# Patient Record
Sex: FEMALE | Race: WHITE | NOT HISPANIC OR LATINO | Employment: FULL TIME | URBAN - METROPOLITAN AREA
[De-identification: names, ages, dates, MRNs, and addresses within clinical notes are randomized per-mention and may not be internally consistent; named-entity substitution may affect disease eponyms.]

---

## 2017-02-23 ENCOUNTER — HOSPITAL ENCOUNTER (EMERGENCY)
Facility: CLINIC | Age: 27
Discharge: HOME | End: 2017-02-23
Attending: PHYSICIAN ASSISTANT | Admitting: PHYSICIAN ASSISTANT
Payer: COMMERCIAL

## 2017-02-23 VITALS
BODY MASS INDEX: 29.88 KG/M2 | DIASTOLIC BLOOD PRESSURE: 81 MMHG | SYSTOLIC BLOOD PRESSURE: 121 MMHG | WEIGHT: 175 LBS | RESPIRATION RATE: 16 BRPM | HEART RATE: 97 BPM | HEIGHT: 64 IN | TEMPERATURE: 98 F | OXYGEN SATURATION: 100 %

## 2017-02-23 DIAGNOSIS — R11.10 VOMITING AND DIARRHEA: ICD-10-CM

## 2017-02-23 DIAGNOSIS — R19.7 VOMITING AND DIARRHEA: ICD-10-CM

## 2017-02-23 LAB
ANION GAP SERPL CALCULATED.3IONS-SCNC: 13 MMOL/L (ref 3–14)
BUN SERPL-MCNC: 12 MG/DL (ref 7–30)
CALCIUM SERPL-MCNC: 8.9 MG/DL (ref 8.5–10.1)
CHLORIDE SERPL-SCNC: 108 MMOL/L (ref 94–109)
CO2 SERPL-SCNC: 19 MMOL/L (ref 20–32)
CREAT SERPL-MCNC: 0.72 MG/DL (ref 0.52–1.04)
GFR SERPL CREATININE-BSD FRML MDRD: ABNORMAL ML/MIN/1.7M2
GLUCOSE SERPL-MCNC: 120 MG/DL (ref 70–99)
POTASSIUM SERPL-SCNC: 4 MMOL/L (ref 3.4–5.3)
SODIUM SERPL-SCNC: 140 MMOL/L (ref 133–144)

## 2017-02-23 PROCEDURE — 80048 BASIC METABOLIC PNL TOTAL CA: CPT | Performed by: PHYSICIAN ASSISTANT

## 2017-02-23 PROCEDURE — 96361 HYDRATE IV INFUSION ADD-ON: CPT

## 2017-02-23 PROCEDURE — 96374 THER/PROPH/DIAG INJ IV PUSH: CPT

## 2017-02-23 PROCEDURE — 99284 EMERGENCY DEPT VISIT MOD MDM: CPT | Mod: 25

## 2017-02-23 PROCEDURE — 25000128 H RX IP 250 OP 636: Performed by: PHYSICIAN ASSISTANT

## 2017-02-23 RX ORDER — ONDANSETRON 4 MG/1
4 TABLET, ORALLY DISINTEGRATING ORAL EVERY 4 HOURS PRN
Qty: 10 TABLET | Refills: 0 | Status: SHIPPED | OUTPATIENT
Start: 2017-02-23 | End: 2017-02-26

## 2017-02-23 RX ORDER — ONDANSETRON 2 MG/ML
4 INJECTION INTRAMUSCULAR; INTRAVENOUS ONCE
Status: COMPLETED | OUTPATIENT
Start: 2017-02-23 | End: 2017-02-23

## 2017-02-23 RX ADMIN — SODIUM CHLORIDE 1000 ML: 9 INJECTION, SOLUTION INTRAVENOUS at 16:14

## 2017-02-23 RX ADMIN — ONDANSETRON 4 MG: 2 INJECTION INTRAMUSCULAR; INTRAVENOUS at 16:14

## 2017-02-23 ASSESSMENT — ENCOUNTER SYMPTOMS
DIARRHEA: 1
VOMITING: 1
COUGH: 0
ABDOMINAL PAIN: 1
DIZZINESS: 1
NAUSEA: 1

## 2017-02-23 NOTE — ED PROVIDER NOTES
History     Chief Complaint:  Nausea, vomiting, and diarrhea    HPI   Gloria Rodríguez is a 26 year old female who presents with  nausea, vomiting, and diarrhea that began this morning. The patient began having the symptoms, along with a sharp pain in her stomach this morning. She notes that she is also experiencing dizziness, potentially due to dehydration. She denies any cough or runny nose outside of what may be explained by her allergies. She is particularly worried about her symptoms given that she has a 5 month old baby at home and she feels dehydated. Of note, she has recently had her period at the end of January and is not concerned for pregnancy.     With dAD  Allergies:  Ibuprofen, hives  Sulfa drugs, hives  Silver, rash    Medications:    None    Past Medical History:    The patient denies any significant past medical history.    Past Surgical History:    The patient does not have any pertinent past surgical history.    Family History:    No past pertinent family history.      Social History:  Negative for tobacco use.  Negative for alcohol use.  Accompanied by her father   Marital Status:  Single [1]     Review of Systems   Respiratory: Negative for cough.    Gastrointestinal: Positive for abdominal pain, diarrhea, nausea and vomiting.   Neurological: Positive for dizziness.   All other systems reviewed and are negative.      Physical Exam   First Vitals:  /68  Pulse 115  RR 18  SPO2 100%   Temp 98F    Physical Exam  General: Alert, interactive, appears comfortable but fatiged    Eye:  Pupils are equal, round, and reactive.      ENT:     No rhinorrhea.     Dry  mucus membranes.  Oropharynx is clear with no exudates.     Uvula midline    Neck: No rigidity.               Cardiac:  Mildly tachycardic rate and rhythm. S1, S2.  No murmurs, gallops, or rubs.    Pulmonary:  Clear to auscultation bilaterally.  No wheezes or crackles.             Non labored. No use of accessory muscles.     Abdomen:   Abdomen is soft and non-distended, without focal tenderness. No rebound or guarding.     Musculoskeletal:  Freely moveable extremities    Skin:  Warm and dry without rashes.    Neurologic:  Non-focal exam without asymmetric weakness or numbness.  GCS 15    Psychiatric:  Awake. Normal affect with appropriate interaction with examiner.      Emergency Department Course   Laboratory:  BMP: Na 140, K 4.0, Glucose 120, Cr 0.72, GFR >90, BUN 12    Interventions:  1L NS IV  Zofran 4mg IV     Emergency Department Course:  Nursing notes and vitals reviewed.  I performed an exam of the patient as documented above.    Blood drawn. This was sent to the lab for further testing, results above.    The patient provided a urine sample here in the emergency department. This was sent for laboratory testing, findings above.     The patient was shared the results and is discharged home.       Impression & Plan      Medical Decision Making:  Gloria Rodríguez is a 26 year old female who presents with  nausea, vomiting, and diarrhea that began this morning. The pt is afebrile and clinically well appearing. She clinically appears mildly dehydrated and was given IV fluid and zofran. BMP is unremarkable for significant electrolyte abnormality. She has a benign abdominal exam making severe underlying acute abdomen unlikely therefore I did not pursue any further abdominal imaging. Clinically, this is likely a viral illness or possible food borne. I believe she is stable to discharge home with zofran and oral hydration. I told her to return to the ED if she develops a fever, consistent worsening abdominal pain, bloody stools.     Diagnosis:    ICD-10-CM    1. Vomiting and diarrhea R11.10 Basic metabolic panel    R19.7          Discharge Medications:  New Prescriptions    ONDANSETRON (ZOFRAN ODT) 4 MG ODT TAB    Take 1 tablet (4 mg) by mouth every 4 hours as needed for nausea       Fredis SMITH am serving as a scribe on 2/23/2017 at 3:28 PM to  personally document services performed by Elyssa Duncan PA, based on my observations and the provider's statements to me.    Samuel Coronado  2/23/2017    EMERGENCY DEPARTMENT       Elyssa Duncan PA-C  02/23/17 0910

## 2017-02-23 NOTE — ED AVS SNAPSHOT
Emergency Department    [address] [place] [address]    Phone:  [phone]    Fax:  [phone]                                       Gloria VILLAFANA Marcos   MRN: [id number]    Department:   Emergency Department   Date of Visit:  2/23/2017           After Visit Summary Signature Page     I have received my discharge instructions, and my questions have been answered. I have discussed any challenges I see with this plan with the nurse or doctor.    ..........................................................................................................................................  Patient/Patient Representative Signature      ..........................................................................................................................................  Patient Representative Print Name and Relationship to Patient    ..................................................               ................................................  Date                                            Time    ..........................................................................................................................................  Reviewed by Signature/Title    ...................................................              ..............................................  Date                                                            Time

## 2017-02-23 NOTE — LETTER
EMERGENCY DEPARTMENT  [address] [facility] [address]  Phone: [phone]  Fax: [phone]    February 23, 2017        Gloria Rodríguez  [address] [facility] [address]          To whom it may concern:    RE: Gloria VILLAFANA Marcos    Patient was seen and treated today at our clinic and missed work. Please excuse her 2/23/17, 2/24/17, and 2/25/17 due to illness.     Please contact me for questions or concerns.      Sincerely,      Elyssa Duncan PA-C

## 2017-02-23 NOTE — DISCHARGE INSTRUCTIONS
Discharge Instructions  Gastroenteritis    You have been seen today for vomiting and diarrhea, called gastroenteritis or the stomach flu. This is usually caused by a virus, but some bacteria, parasites, medicines or other medical conditions can cause similar symptoms. At this time your doctor does not find that your vomiting and diarrhea is a sign of anything dangerous or life-threatening.  However, sometimes the signs of serious illness do not show up right away.  If you have new or worse symptoms, you may need to be seen again in the Emergency Department or by your primary doctor. Remember that serious problems like appendicitis can look like gastroenteritis at first.       Return to the Emergency Department if:    You keep throwing up and you are not able to keep liquids down.    You feel you are getting dehydrated, such as being very thirsty, not urinating at least every 8-12 hours, or feeling faint or lightheaded.     You develop a new fever, or your fever continues for more than 2 days.    You have belly pain that seems worse than cramps, is in one spot, or is getting worse over time.     You have blood in your vomit or in your diarrhea.    You feel very weak.    You are not starting to improve within 24 hours of your visit here.    What can I do to help myself?    The most important thing to do is to drink clear liquids.  If you have been vomiting a lot, it is best to have only small, frequent sips of liquids.  Drinking too much at once may cause more vomiting. If you are vomiting often, you must replace minerals, sodium and potassium lost with your illness. Pedialyte  and sports drinks can help you replace these minerals.  You can also drink clear liquids such as water, weak tea, apple juice, and 7-Up . Avoid acid liquids (orange), caffeine (coffee) or alcohol. Do not drink milk until you no longer have diarrhea.    After liquids are staying down, you may start eating mild foods. Soda crackers, toast, plain  noodles, gelatin, applesauce and bananas are good first choices.  Avoid foods that have acid, are spicy, fatty or fibrous (such as meats, coarse grains, vegetables). You may start eating these foods again in about 3 days when you are better.    Sometimes treatment includes prescription medicine to prevent nausea and vomiting and to prevent diarrhea. If your doctor prescribes these for you, take them as directed.    Nonprescription medicine is available for the treatment of diarrhea and can be very effective.  If you use it, make sure you use the dose recommended on the package. Avoid Lomotil . Check with your healthcare provider before you use any medicine for diarrhea.    Don t take ibuprofen, or other nonsteroidal anti-inflammatory medicines without checking with your healthcare provider.  If you were given a prescription for medicine here today, be sure to read all of the information (including the package insert) that comes with your prescription.  This will include important information about the medicine, its side effects, and any warnings that you need to know about.  The pharmacist who fills the prescription can provide more information and answer questions you may have about the medicine.  If you have questions or concerns that the pharmacist cannot address, please call or return to the Emergency Department.       Remember that you can always come back to the Emergency Department if you are not able to see your regular doctor in the amount of time listed above, if you get any new symptoms, or if there is anything that worries you.

## 2018-02-06 ENCOUNTER — ALLIED HEALTH/NURSE VISIT (OUTPATIENT)
Dept: EDUCATION SERVICES | Facility: CLINIC | Age: 28
End: 2018-02-06
Payer: COMMERCIAL

## 2018-02-06 VITALS — BODY MASS INDEX: 34.72 KG/M2 | HEIGHT: 64 IN | WEIGHT: 203.4 LBS

## 2018-02-06 DIAGNOSIS — O24.419 GDM (GESTATIONAL DIABETES MELLITUS): Primary | ICD-10-CM

## 2018-02-06 PROCEDURE — 99207 ZZC DROP WITH A PROCEDURE: CPT

## 2018-02-06 PROCEDURE — G0109 DIAB MANAGE TRN IND/GROUP: HCPCS

## 2018-02-06 RX ORDER — BLOOD-GLUCOSE METER
EACH MISCELLANEOUS
Qty: 1 KIT | Refills: 0 | COMMUNITY
Start: 2018-02-06 | End: 2019-07-20

## 2018-02-06 NOTE — PROGRESS NOTES
"Diabetes Self-Management Training - Gestational Diabetes    SUBJECTIVE/OBJECTIVE:  Gloria Tsain presents today for education related to gestational diabetes.  She is accompanied by self    Patient's gestational diabetes management related comments/concerns: keeping a regular diet and not eating enough    Patient's emotional response to diabetes: expresses readiness to learn    Ht 1.626 m (5' 4\")  Wt 92.3 kg (203 lb 6.4 oz)  BMI 34.91 kg/m2    Pre pregnancy weight: 200#    Weight gain 3.4 lbs at 31 weeks gestation.  Says her doctor told her to cut out carbs and sugar and thinks she lost 8 lbs over the weekend since didn't eat much.     Estimated Date of Delivery: Has  scheduled for April 3, 2018    Lab Results   Component Value Date     2017       1 hour OGTT: 140 on 18  3 hour OGTT: Fastin; 1 hr: 188; 2 hr: 176, 3 hr: 146 on 18    History   Smoking Status     Never Smoker   Smokeless Tobacco     Never Used       Lifestyle and Health Behaviors:  Physical Activity: Walking 30 minutes a day.    Nutrition:  Patient eats 3 meals and 3 snacks per day.    Wakes: 8-9am  Breakfast:  10am: eggs with mushroom   Snack:  12pm: cottage cheese  Lunch:  1:30-2pm: chicken breast with veggies  Snack:  4-5pm: peanuts  Dinner:  8pm: fish with veggies   Bedtime Snack:  11pm: pickles, cottage cheese OR sliced cheese    Other time(s) food is eaten? none     Beverages: Juice 8oz/day, Milk 16oz/day, Pop (Regular) 8oz/day and Water all day    Cultural/Yazdanism diet restrictions: No  Food allergy: bananas    Biggest challenge to healthy eating is:  variety    Pre-Karen Vitamin: Yes    Supplements: No   Experiencing nausea?  Sometimes     Socio/Economic considerations:  Support System: partner    Health Beliefs and Attitudes:   Stage of Change: ACTION (Actively working towards change)    ASSESSMENT:  Reviewed target blood glucose values, sharps disposal, diagnosis criteria for GDM and importance of good " blood glucose management for health of mom and baby. Patient advised to call if 3 blood glucose elevated before returns next week. Instructed on ketone checking and told to call if unable to get ketones negative.      Discussed carbohydrate sources and impact on blood glucose. Reviewed basics of healthy eating and incorporating a variety of foods into meal plan. Instructed on carbohydrate counting and label reading and recommended patient consume 2 CHO for breakfast, 4 CHO for lunch and dinner and 1-2 CHO for each snack, 3 snacks a day. This meal plan will provide 13-16 CHO/day so informed patient to call if struggling since may be able to adjust meal plan if needed.  Used food models to help demonstrate portion control and suggested plate method to balance meals. Discussed importance of not going too low in CHO since that may cause liver to produce excess glucose and contribute to elevated blood glucose readings and ketone formation. Encouraged eating breakfast within 1 hour of waking.  To also help prevent against ketone formation, protein was encouraged with meals and snacks, especially with the night snack. Reviewed benefits of exercising to help lower blood glucose and encouraged 30 minutes a day as tolerated and per MD approval, and to target exercise after meals if feel consumed too many CHO or having problem with BG being elevated after a meal. Pt verbalized understanding of concepts discussed and recommendations provided.    Estimated Energy Needs: 2420 kcal/day (16-20 CHO)      INTERVENTION:  Patient was instructed on One Touch Verio Flex meter and was able to provide an accurate return demonstration. Patient's blood glucose reading today was 72 mg/dL, fasting.    Educational topics covered today:  GDM diagnosis, pathophysiology, Risks and Complications of GDM, Means of controlling GDM, Using a Blood Glucose Monitor, Blood Glucose Goals, Logging and Interpreting Glucose Results, Ketone Testing, When to  Call a Diabetes Educator or OB Provider, Healthy Eating During Pregnancy, Counting Carbohydrates, Meal Planning for GDM, and Physical Activity    Educational materials provided today:   [facility] Understanding Gestational Diabetes  GDM Log Book  Sharps Disposal  Care After Delivery  One Touch Verio Flex meter kit    Pt verbalized understanding of concepts discussed and recommendations provided today.     PLAN:  Check glucose 4 times daily, before breakfast and 1 hour after each meal.     Check Ketones daily for one week, if negative, reduce testing to once a week.     Physical activity recommended: after meals as tolerated and per MD approval if blood glucose high.    Meal plan: 2 carbs at breakfast, 4 carbs at lunch, 4 carbs at supper, 1-2 carbs at 3 snacks a day.  Follow consistent CHO meal plan, eat CHO and protein/fat at all meals/snacks.    Call/e-mail/MyChart message diabetes educator if 3 or more blood sugars are above the goal in 1 week or if ketones are positive.     Call/e-mail/MyChart message with questions/concerns.    FOLLOW-UP:  Call or e-mail educator if 3 or more blood sugars are above goal in 1 week.  Appointment scheduled on 2/12/18.   EMAIL: [email or web address] (consent signed)    Remedios Solomon RD, [place], CDE   Time Spent: 120 minutes  Encounter type: Group class

## 2018-02-06 NOTE — MR AVS SNAPSHOT
After Visit Summary   2/6/2018    Gloria Tsain    MRN: [id number]           Patient Information     Date Of Birth          1990        Visit Information        Provider Department      2/6/2018 9:00 AM BK GDM [facility] [place]        Today's Diagnoses     GDM (gestational diabetes mellitus)    -  1      Care Instructions    1. Check blood sugar 4 times a day, before breakfast and 1 hour after the start of each meal.     2. Check urine ketones when you wake up every morning for 7 days. If negative everyday, reduce testing to once a week.    3. Follow the recommended meal plan: eat something every 2-3 hours, include protein/fat and carbohydrate at every meal and snack, have 30gm carbs at breakfast, 60gm carbs at lunch, 60gm carbs at supper, 15-30gm carbs at 3 snacks per day.     4. Add activity to every day, try walking or being active after each meal to help control blood sugar levels.    5.Call or e-mail educator if 3 or more blood sugars are above goal in 1 week. Call or e-mail with questions or concerns.      FOLLOW UP: Monday, February 12th at 9am at [facility]    Remedios Solomon RD, LD, CDE   [phone]    [facility] for the [facility]:  For Your Diabetes Education or Nutrition Appointments Call:  [phone]   For Diabetes Education and Nutrition Related Questions:   Phone: [phone]  E-mail: [email or web address]  Fax: [phone]   If you need a medication refill please contact your pharmacy. Please allow 3 business days for your refills to be completed.     Instructions for emailing to the Diabetes Educator    If you need to communicate a non-urgent message with a Diabetes Educator via email, please send to [email or web address].    Please follow the following email guidelines:    Subject line: Secure: clinic name  Body of email include: First name, middle initial, last name and date of birth.    We  "will be in touch with you within one (1) business days.            Follow-ups after your visit        Your next 10 appointments already scheduled     2018  9:00 AM [facility]   Diabetic Education with [facility] DI[facility]ETIC ED RESOURCE   [facility]    [address] [place] [id number] [phone]              Who to contact     If you have questions or need follow up information about today's clinic visit or your schedule please contact [facility] directly at [phone].  Normal or non-critical lab and imaging results will be communicated to you by [employer], letter or phone within 4 business days after the clinic has received the results. If you do not hear from us within 7 days, please contact the clinic through "[employer]t or phone. If you have a critical or abnormal lab result, we will notify you by phone as soon as possible.  Submit refill requests through [employer] or call your pharmacy and they will forward the refill request to us. Please allow 3 business days for your refill to be completed.          Additional Information About Your Visit        MyChart Information     [employer] lets you send messages to your doctor, view your test results, renew your prescriptions, schedule appointments and more. To sign up, go to [email or web address] . Click on \"Log in\" on the left side of the screen, which will take you to the Welcome page. Then click on \"Sign up Now\" on the right side of the page.     You will be asked to enter the access code listed below, as well as some personal information. Please follow the directions to create your username and password.     Your access code is: [email or web address]  Expires: 2018 10:53 AM     Your access code will  in 90 days. If you need help or a new code, please call your [facility]3] or [phone].        Care EveryWhere ID     This is your Care EveryWhere ID. This " "could be used by other organizations to access your [facility] medical records  [id number]        Your Vitals Were     Height BMI (Body Mass Index)                1.626 m (5' 4\") 34.91 kg/m2           Blood Pressure from Last 3 Encounters:   02/23/17 121/81   11/20/13 126/60   11/15/12 120/76    Weight from Last 3 Encounters:   02/06/18 92.3 kg (203 lb 6.4 oz)   02/23/17 79.4 kg (175 lb)   11/20/13 83.5 kg (184 lb)              Today, you had the following     No orders found for display         Today's Medication Changes          These changes are accurate as of 2/6/18 10:54 AM.  If you have any questions, ask your nurse or doctor.               Start taking these medicines.        Dose/Directions    acetone (Urine) test Strp   Used for:  GDM (gestational diabetes mellitus)        Use to check urine for ketones every morning.   Quantity:  20 each   Refills:  3       blood glucose monitoring lancets   Used for:  GDM (gestational diabetes mellitus)        Use to test blood sugars 4 times daily.   Quantity:  150 each   Refills:  3       blood glucose monitoring meter device kit   Used for:  GDM (gestational diabetes mellitus)        Use to test blood sugars 4 times daily or as directed.   Quantity:  1 kit   Refills:  0       blood glucose monitoring test strip   Commonly known as:  ONETOUCH VERIO IQ   Used for:  GDM (gestational diabetes mellitus)        Use to test blood sugars 4 times daily (before breakfast and 1 hour after start of each meal).   Quantity:  150 strip   Refills:  3            Where to get your medicines      These [facility] [address] [place] [address] [address] AT [facility] [place] [address], [facility] [address]     Phone:  [phone]     acetone (Urine) test Strp    blood glucose monitoring lancets    blood glucose monitoring test strip         Some of these will need a paper prescription and others can be bought over the counter.  " Ask your nurse if you have questions.     You don't need a prescription for these medications     blood glucose monitoring meter device kit                Primary Care Provider Fax #    Physician No Ref-Primary [phone]       No address on file        Equal Access to Services     ALAINA OLVERA : Pat christy nagel doreneo Somarthaali, waaxda luqadaha, qaybta kaalmada adearsenio, laura lindsayalonso feldman. So [facility] [phone].    ATENCIÓN: Si habla español, tiene a garcia disposición servicios gratuitos de asistencia lingüística. Llame al [phone].    We comply with applicable federal civil rights laws and [place] laws. We do not discriminate on the basis of race, color, national origin, age, disability, sex, sexual orientation, or gender identity.            Thank you!     Thank you for choosing [facility]  for your care. Our goal is always to provide you with excellent care. Hearing back from our patients is one way we can continue to improve our services. Please take a few minutes to complete the written survey that you may receive in the mail after your visit with us. Thank you!             Your Updated Medication List - Protect others around you: Learn how to safely use, store and throw away your medicines at www.disposemymeds.org.          This list is accurate as of 2/6/18 10:54 AM.  Always use your most recent med list.                   Brand Name Dispense Instructions for use Diagnosis    acetone (Urine) test Strp     20 each    Use to check urine for ketones every morning.    GDM (gestational diabetes mellitus)       blood glucose monitoring lancets     150 each    Use to test blood sugars 4 times daily.    GDM (gestational diabetes mellitus)       blood glucose monitoring meter device kit     1 kit    Use to test blood sugars 4 times daily or as directed.    GDM (gestational diabetes mellitus)       blood glucose monitoring test strip    ONETOUCH VERIO IQ    150  strip    Use to test blood sugars 4 times daily (before breakfast and 1 hour after start of each meal).    GDM (gestational diabetes mellitus)

## 2018-02-06 NOTE — PATIENT INSTRUCTIONS
1. Check blood sugar 4 times a day, before breakfast and 1 hour after the start of each meal.     2. Check urine ketones when you wake up every morning for 7 days. If negative everyday, reduce testing to once a week.    3. Follow the recommended meal plan: eat something every 2-3 hours, include protein/fat and carbohydrate at every meal and snack, have 30gm carbs at breakfast, 60gm carbs at lunch, 60gm carbs at supper, 15-30gm carbs at 3 snacks per day.     4. Add activity to every day, try walking or being active after each meal to help control blood sugar levels.    5.Call or e-mail educator if 3 or more blood sugars are above goal in 1 week. Call or e-mail with questions or concerns.      FOLLOW UP: Monday, February 12th at 9am at [facility]    Remedios Solomon RD, LD, CDE   [phone]    [facility] for the [facility]:  For Your Diabetes Education or Nutrition Appointments Call:  [phone]   For Diabetes Education and Nutrition Related Questions:   Phone: [phone]  E-mail: [email or web address]  Fax: [phone]   If you need a medication refill please contact your pharmacy. Please allow 3 business days for your refills to be completed.     Instructions for emailing to the Diabetes Educator    If you need to communicate a non-urgent message with a Diabetes Educator via email, please send to [email or web address].    Please follow the following email guidelines:    Subject line: Secure: clinic name  Body of email include: First name, middle initial, last name and date of birth.    We will be in touch with you within one (1) business days.

## 2018-02-12 ENCOUNTER — ALLIED HEALTH/NURSE VISIT (OUTPATIENT)
Dept: EDUCATION SERVICES | Facility: CLINIC | Age: 28
End: 2018-02-12
Payer: COMMERCIAL

## 2018-02-12 VITALS — WEIGHT: 206.2 LBS | BODY MASS INDEX: 35.39 KG/M2

## 2018-02-12 DIAGNOSIS — O24.419 GDM (GESTATIONAL DIABETES MELLITUS): Primary | ICD-10-CM

## 2018-02-12 PROCEDURE — 99207 ZZC DROP WITH A PROCEDURE: CPT

## 2018-02-12 PROCEDURE — G0108 DIAB MANAGE TRN  PER INDIV: HCPCS

## 2018-02-12 NOTE — PATIENT INSTRUCTIONS
Goals:    1. Keep drinking fluids     2. Try to eat something small overnight to see if it helps with ketones    3. Doing great job following the meal plan and continue to eat something with carbohydrates every 2-3 hours.     FOLLOW UP: Email blood sugars in 1 week     Remedios Solomon RD, LD, CDE   [phone]    Instructions for emailing to the Diabetes Educator    If you need to communicate a non-urgent message with a Diabetes Educator via email, please send to [email or web address].    Please follow the following email guidelines:    Subject line: Secure: clinic name  Body of email include: First name, middle initial, last name and date of birth.    We will be in touch with you within one (1) business days.

## 2018-02-12 NOTE — PROGRESS NOTES
Gestational Diabetes Follow-up Visit    SUBJECTIVE/OBJECTIVE:  Gloria VILLAFANA Marcos presents today for education and evaluation of glucose control related to gestational diabetes    She is accompanied by spouse and son    Patient's gestational diabetes management related comments/concerns: says not sure if she was sleeping too long- noticed higher blood glucose a few mornings.  Says eating same snack before bed and going to bed within 1/2 hour of eating a snack.     Says finding options to eat.  Sometimes a challenge to get to 60 gm carbohydrates.  Says drinks a lot of fluids during the day- constantly feels thirsty.  Says tries to eat every 2-3 hours.    Wt 93.5 kg (206 lb 3.2 oz)  BMI 35.39 kg/m2    Weight gain 2.8 lbs in the past week.    Blood Glucose/Ketone Log:    Date Ketones Fasting Post Breakfast Post Lunch Post Supper   2/6  72 82 - 90   2/7  88 100 93 95   2/8 15 88 109 108 103   2/9 5 91 113 90 98   2/10 15 89 109 - 113   2/11 15 92 121 116 98   2/12 5 86        Current gestational diabetes management:    Taking medications for gestational diabetes? No    Physical Activity: No change but says feeling more tired and getting more headaches. Tries to walk around the mall.      Nutrition:  Patient eats 3 meals and 3 snacks per day and is following recommended meal plan.  B: English muffin with PB and water  AM: pretzels and cottage cheese OR block cheese or pickles and water OR fruit cup  L: chicken with veggies, roll, glass milk OR grilled cheese, 1 cup tomato soup OR cheese sandwich and milk and 1 can diet pop  PM: pretzel and cheese or pickles  D: chicken noodle soup OR chicken breast with 1/2 baked potato OR taco with shell  and salad with small amount dressing with cheese and mushroom, carrots, radish and 1 cup milk   HS: pretzels OR cheese/cottage cheese, SF jello       ASSESSMENT:  Patient seeing 100% fasting and post-meal blood glucose in target.  She was worried and thought 90 mg/dL was her target and  said the 2 higher fasting blood glucose were after nights of getting more sleep.  She is seeing ketones and unable to get trace.  Saw an almost 3 lb weight gain, has been trying to eat every 2-3 hours, eats protein and carbohydrates for bedtime snack and appears to be meeting carbohydrate needs at meals.  Sounds like eating carbohydrates for snacks too, but this was re-encouraged incase falling low.  She kept track of meals but forgot to bring it in so unable to assess for possible changes in eating between days of trace and small ketones.  Suggested she could also try to eat or drink something small overnight with carbohydrates to see if this also helps get smaller ketones.  Pt verbalized understanding of concepts discussed and recommendations provided.   Will have her follow up in 1 week to check on ketones.    Insulin not instructed today since all blood glucose in target but would need to return for this if insulin needed in the future.  Patient has fear of needles but  supportive.    Health Beliefs and Attitudes:   Stage of Change: ACTION (Actively working towards change)    INTERVENTION:  Educational topics covered today:  What to expect after delivery, Future testing for Type 2 diabetes (2 hour OGTT at 6 week post-partum check-up and annual fasting blood glucose level), Risk of GDM and planning ahead for future pregnancies, Recommended lifestyle interventions for reducing the risk of Type 2 Diabetes, When to Call a Diabetes Educator or OB Provider    Educational Materials provided today:  [facility] Preventing Diabetes    PLAN:  Check glucose 4 times daily.  Check ketones daily until week of negative results.  Then ok to reduce to once a week when readings are consistently negative.  Continue with recommended physical activity.  Continue to follow recommended meal plan: 2 carbs at breakfast, 3-4 carbs at lunch, 3-4 carbs at supper, 1-2 carbs at snacks.  Follow consistent CHO meal plan, eat CHO and  protein/fat at all meals/snacks.    Call/e-mail/MyChart message diabetes educator if 3 or more blood sugars are above the goal in 1 week or if ketones are positive.     FOLLOW-UP:  Follow up with diabetes educator in 1 week.  Call or e-mail educator if 3 or more blood sugars are above goal in 1 week.    Call/e-mail/MyChart message diabetes educator if 3 or more blood sugars are above the goal in 1 week or if ketones are positive.     Remedios Solomon RD, LD, CDE   Time spent was 50 minutes  Encounter type: Individual

## 2018-02-12 NOTE — MR AVS SNAPSHOT
After Visit Summary   2/12/2018    Gloria VILLAFANA Marcos    MRN: [id number]           Patient Information     Date Of Birth          1990        Visit Information        Provider Department      2/12/2018 9:00 AM [facility]        Care Instructions    Goals:    1. Keep drinking fluids     2. Try to eat something small overnight to see if it helps with ketones    3. Doing great job following the meal plan and continue to eat something with carbohydrates every 2-3 hours.     FOLLOW UP: Email blood sugars in 1 week     Remedios Solomon RD, LD, CDE   [phone]    Instructions for emailing to the Diabetes Educator    If you need to communicate a non-urgent message with a Diabetes Educator via email, please send to [email or web address].    Please follow the following email guidelines:    Subject line: Secure: clinic name  Body of email include: First name, middle initial, last name and date of birth.    We will be in touch with you within one (1) business days.            Follow-ups after your visit        Who to contact     If you have questions or need follow up information about today's clinic visit or your schedule please contact [facility] directly at [phone].  Normal or non-critical lab and imaging results will be communicated to you by MyChart, letter or phone within 4 business days after the clinic has received the results. If you do not hear from us within 7 days, please contact the clinic through MyChart or phone. If you have a critical or abnormal lab result, we will notify you by phone as soon as possible.  Submit refill requests through [facility] [employer] or call your pharmacy and they will forward the refill request to us. Please allow 3 business days for your refill to be completed.          Additional Information About Your Visit        [employer] Information     [facility] [employer] lets you send messages to your doctor, view  "your test results, renew your prescriptions, schedule appointments and more. To sign up, go to [email or web address] . Click on \"Log in\" on the left side of the screen, which will take you to the Welcome page. Then click on \"Sign up Now\" on the right side of the page.     You will be asked to enter the access code listed below, as well as some personal information. Please follow the directions to create your username and password.     Your access code is: [email or web address]  Expires: 2018 10:53 AM     Your access code will  in 90 days. If you need help or a new code, please call your [place] clinic or [phone].        Care EveryWhere ID     This is your Care EveryWhere ID. This could be used by other organizations to access your [facility] medical records  [id number]        Your Vitals Were     BMI (Body Mass Index)                   35.39 kg/m2            Blood Pressure from Last 3 Encounters:   17 121/81   13 126/60   11/15/12 120/76    Weight from Last 3 Encounters:   18 93.5 kg (206 lb 3.2 oz)   18 92.3 kg (203 lb 6.4 oz)   17 79.4 kg (175 lb)              Today, you had the following     No orders found for display       Primary Care Provider Fax #    Physician No Ref-Primary [phone]       No address on file        Equal Access to Services     JANIS OLVERA : Hadii christy ku hadasho Somarthaali, waaxda luqadaha, qaybta kaalmada adeegyada, laura watkins . So [facility] [phone].    ATENCIÓN: Si habla español, tiene a garcia disposición servicios gratuitos de asistencia lingüística. Llame al [phone].    We comply with applicable federal civil rights laws and [place] laws. We do not discriminate on the basis of race, color, national origin, age, disability, sex, sexual orientation, or gender identity.            Thank you!     Thank you for choosing [facility]  for your care. Our goal is always to provide you with " excellent care. Hearing back from our patients is one way we can continue to improve our services. Please take a few minutes to complete the written survey that you may receive in the mail after your visit with us. Thank you!             Your Updated Medication List - Protect others around you: Learn how to safely use, store and throw away your medicines at www.disposemymeds.org.          This list is accurate as of 2/12/18  9:49 AM.  Always use your most recent med list.                   Brand Name Dispense Instructions for use Diagnosis    acetone (Urine) test Strp     20 each    Use to check urine for ketones every morning.    GDM (gestational diabetes mellitus)       blood glucose monitoring lancets     150 each    Use to test blood sugars 4 times daily.    GDM (gestational diabetes mellitus)       blood glucose monitoring meter device kit     1 kit    Use to test blood sugars 4 times daily or as directed.    GDM (gestational diabetes mellitus)       blood glucose monitoring test strip    ONETOUCH VERIO IQ    150 strip    Use to test blood sugars 4 times daily (before breakfast and 1 hour after start of each meal).    GDM (gestational diabetes mellitus)

## 2018-04-03 ENCOUNTER — TELEPHONE (OUTPATIENT)
Dept: EDUCATION SERVICES | Facility: CLINIC | Age: 28
End: 2018-04-03

## 2019-07-20 ENCOUNTER — HOSPITAL ENCOUNTER (EMERGENCY)
Facility: CLINIC | Age: 29
Discharge: HOME | End: 2019-07-20
Attending: NURSE PRACTITIONER | Admitting: NURSE PRACTITIONER
Payer: OTHER MISCELLANEOUS

## 2019-07-20 VITALS
SYSTOLIC BLOOD PRESSURE: 129 MMHG | TEMPERATURE: 98.4 F | OXYGEN SATURATION: 100 % | WEIGHT: 170 LBS | BODY MASS INDEX: 29.02 KG/M2 | HEIGHT: 64 IN | DIASTOLIC BLOOD PRESSURE: 83 MMHG
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
## 2019-07-20 PROCEDURE — 99214 OFFICE O/P EST MOD 30 MIN: CPT | Mod: Z6 | Performed by: NURSE PRACTITIONER

## 2019-07-20 PROCEDURE — G0463 HOSPITAL OUTPT CLINIC VISIT: HCPCS | Performed by: NURSE PRACTITIONER

## 2019-07-20 RX ORDER — TRIAMCINOLONE ACETONIDE 1 MG/G
CREAM TOPICAL
Qty: 45 G | Refills: 0 | Status: SHIPPED | OUTPATIENT
Start: 2019-07-20 | End: 2019-08-03

## 2019-07-20 RX ORDER — OXYCODONE AND ACETAMINOPHEN 5; 325 MG/1; MG/1
1-2 TABLET ORAL
COMMUNITY
Start: 2015-07-22 | End: 2019-07-20

## 2019-07-20 RX ORDER — PRENATAL VIT/IRON FUM/FOLIC AC 27MG-0.8MG
1 TABLET ORAL
COMMUNITY
End: 2019-07-20

## 2019-07-20 ASSESSMENT — MIFFLIN-ST. JEOR: SCORE: 1496.11

## 2019-07-20 NOTE — ED PROVIDER NOTES
History     Chief Complaint   Patient presents with     Insect Bite     bit by an unknown insect today on right 4th finger- finger swollen     HPI  Gloria Rodríguez is a 28 year old female who presents with concerns of pain secondary to an insect bite.  Patient states that she is a .  Patient states she was opening up a mailbox today and felt a bite on the inside of her fourth finger of her right hand.  Patient reported immediate pain and has noticed subsequent redness and swelling.  Patient states the pain now shoots downward approximately 2 inches into her hand.    Patient denies any shortness of breath, difficulty breathing, allergies to bees.    Allergies:  Allergies   Allergen Reactions     Fruit Extracts      Bananas, itchy mouth     Ibuprofen Sodium Hives     Oseltamivir      Other reaction(s): Tremors     Sulfa Drugs Hives     No Clinical Screening - See Comments Rash     bleach     Silver Rash     Sodium Hypochlorite Other (See Comments) and Rash       Problem List:    Patient Active Problem List    Diagnosis Date Noted     CARDIOVASCULAR SCREENING; LDL GOAL LESS THAN 130      Priority: Medium     NO ACTIVE PROBLEMS 11/15/2012     Priority: Medium        Past Medical History:    Past Medical History:   Diagnosis Date     NO ACTIVE PROBLEMS        Past Surgical History:    Past Surgical History:   Procedure Laterality Date     NO HISTORY OF SURGERY         Family History:    No family history on file.    Social History:  Marital Status:  Single [1]  Social History     Tobacco Use     Smoking status: Never Smoker     Smokeless tobacco: Never Used   Substance Use Topics     Alcohol use: No     Drug use: No     Comment: not anymore, marijuana use occas in past        Medications:      triamcinolone (KENALOG) 0.1 % external cream         Review of Systems  Pertinent positives and negatives listed in the HPI, all other systems reviewed and are negative.    Physical Exam   BP: 129/83  Heart Rate:  "55  Temp: 98.4  F (36.9  C)  Height: 162.6 cm (5' 4\")  Weight: 77.1 kg (170 lb)  SpO2: 100 %      Physical Exam   Constitutional: She appears well-developed and well-nourished. No distress.   HENT:   Head: Normocephalic and atraumatic.   Right Ear: External ear normal.   Left Ear: External ear normal.   Eyes: Conjunctivae are normal. Right eye exhibits no discharge. Left eye exhibits no discharge.   Cardiovascular: Regular rhythm and normal heart sounds. Exam reveals no friction rub.   No murmur heard.  Pulmonary/Chest: Effort normal and breath sounds normal. No stridor. No respiratory distress. She has no wheezes. She has no rales. She exhibits no tenderness.   Musculoskeletal:        Right hand: She exhibits swelling (faint swelling with two tiny papules with underlying erythema noted on the medial aspect of the proximal 4th finger without neurovascular changes or deformity). She exhibits normal range of motion, no tenderness, no bony tenderness, normal two-point discrimination, normal capillary refill, no deformity and no laceration. Normal sensation noted. Decreased sensation is not present in the ulnar distribution, is not present in the medial distribution and is not present in the radial distribution. Normal strength noted. She exhibits no finger abduction and no thumb/finger opposition.   Neurological: She is alert.   Skin: Skin is warm and dry. No rash noted. She is not diaphoretic. No erythema. No pallor.   Psychiatric: She has a normal mood and affect.   Nursing note and vitals reviewed.      ED Course        Procedures    No results found for this or any previous visit (from the past 24 hour(s)).    Medications - No data to display    Assessments & Plan (with Medical Decision Making)     I have reviewed the nursing notes.    I have reviewed the findings, diagnosis, plan and need for follow up with the patient.  Gloria Rodríguez is a 28 year old female who presents with concerns of pain secondary to an " insect bite.  Patient states that she is a .  Patient states she was opening up a mailbox today and felt a bite on the inside of her fourth finger of her right hand.  Patient reported immediate pain and has noticed subsequent redness and swelling.  Patient states the pain now shoots downward approximately 2 inches into her hand.    Exam as noted above and no deformities noted no indication for x-rays.  No trauma to indicate need for x-rays.  Exam is consistent with insect bite possibly be or wasp based upon description of how incident occurred but could be alternative insect as well.  Reviewed with patient care and treatment of insect bites.  And due to the discomfort in the redness prescribed steroid cream to apply twice daily until resolved.  Encourage follow-up if concerns of infection.  Workability form filled out as this was a work-related incident.  No work restrictions written.  Patient discharged in stable condition.     Medication List      Started    triamcinolone 0.1 % external cream  Commonly known as:  KENALOG  80 grams            Final diagnoses:   Insect bite - right fourth finger       7/20/2019   [facility] EMERGENCY DEPARTMENT     Gloria Alfredo APRN CNP  07/20/19 [id number]

## 2019-07-20 NOTE — DISCHARGE INSTRUCTIONS
Apply triamcinolone twice daily for redness to insect bite for 7 days.  Follow-up here if you are concerned about possible infection.

## 2019-07-20 NOTE — ED AVS SNAPSHOT
[facility]  [address] [facility] [address]  Phone:  [phone]  Fax:  [phone]                                    Gloria Rodríguez   MRN: [id number]    Department:  [facility]   Date of Visit:  7/20/2019           After Visit Summary Signature Page    I have received my discharge instructions, and my questions have been answered. I have discussed any challenges I see with this plan with the nurse or doctor.    ..........................................................................................................................................  Patient/Patient Representative Signature      ..........................................................................................................................................  Patient Representative Print Name and Relationship to Patient    ..................................................               ................................................  Date                                   Time    ..........................................................................................................................................  Reviewed by Signature/Title    ...................................................              ..............................................  Date                                               Time          22EPIC Rev 08/18

## 2019-07-30 ENCOUNTER — HOSPITAL ENCOUNTER (EMERGENCY)
Facility: CLINIC | Age: 29
Discharge: HOME | End: 2019-07-30
Attending: PHYSICIAN ASSISTANT | Admitting: PHYSICIAN ASSISTANT

## 2019-07-30 ENCOUNTER — APPOINTMENT (OUTPATIENT)
Dept: GENERAL RADIOLOGY | Facility: CLINIC | Age: 29
End: 2019-07-30
Attending: PHYSICIAN ASSISTANT

## 2019-07-30 VITALS
HEIGHT: 64 IN | WEIGHT: 175 LBS | TEMPERATURE: 98.4 F | RESPIRATION RATE: 18 BRPM | SYSTOLIC BLOOD PRESSURE: 130 MMHG | BODY MASS INDEX: 29.88 KG/M2 | HEART RATE: 90 BPM | DIASTOLIC BLOOD PRESSURE: 85 MMHG

## 2019-07-30 DIAGNOSIS — S99.912A ANKLE INJURY, LEFT, INITIAL ENCOUNTER: ICD-10-CM

## 2019-07-30 PROCEDURE — 73610 X-RAY EXAM OF ANKLE: CPT | Mod: RT

## 2019-07-30 PROCEDURE — 99213 OFFICE O/P EST LOW 20 MIN: CPT | Mod: Z6 | Performed by: PHYSICIAN ASSISTANT

## 2019-07-30 PROCEDURE — G0463 HOSPITAL OUTPT CLINIC VISIT: HCPCS | Performed by: PHYSICIAN ASSISTANT

## 2019-07-30 ASSESSMENT — ENCOUNTER SYMPTOMS
NUMBNESS: 0
WEAKNESS: 0

## 2019-07-30 ASSESSMENT — MIFFLIN-ST. JEOR: SCORE: 1513.79

## 2019-07-30 NOTE — LETTER
July 30, 2019      To Whom It May Concern:      Gloria LEDY Rodríguez was seen in our Urgent Care Department today, 07/30/19.  Please restrict patient when it come to ambulating and standing for extended period of time during the work shift until patient is re-evaluated due to injury.        Sincerely,        Opal Lyles PA-C

## 2019-07-30 NOTE — ED PROVIDER NOTES
History     Chief Complaint   Patient presents with     Ankle Pain     tripped  injured right ankle      HPI  Gloria Rodríguez is a 29 year old female who sustained a right ankle injury 3 days ago.   Mechanism of injury: patient was running and chasing after her son when she tripped over a tree root and injured right ankle. Patient states she felt a pop, but was able to weight bear. Patient states today she was at work and felt another pop in right ankle this time with instant bruising and swelling.    Immediate symptoms: immediate pain, immediate swelling, was able to bear weight directly after injury, no deformity was noted by the patient.   Symptoms have been sudden since that time.   Prior history of related problems: no prior problems with this area in the past.  Patient denies numbness, weakness, tingling, foot or knee pain.      Problem list, Medication list, Allergies, and Medical/Social/Surgical histories reviewed in EPIC and updated as appropriate.        Allergies:  Allergies   Allergen Reactions     Fruit Extracts      Bananas, itchy mouth     Ibuprofen Sodium Hives     Oseltamivir      Other reaction(s): Tremors     Sulfa Drugs Hives     No Clinical Screening - See Comments Rash     bleach     Silver Rash     Sodium Hypochlorite Other (See Comments) and Rash       Problem List:    Patient Active Problem List    Diagnosis Date Noted     CARDIOVASCULAR SCREENING; LDL GOAL LESS THAN 130      Priority: Medium     NO ACTIVE PROBLEMS 11/15/2012     Priority: Medium        Past Medical History:    Past Medical History:   Diagnosis Date     NO ACTIVE PROBLEMS        Past Surgical History:    Past Surgical History:   Procedure Laterality Date     NO HISTORY OF SURGERY         Family History:    No family history on file.    Social History:  Marital Status:  Single [1]  Social History     Tobacco Use     Smoking status: Never Smoker     Smokeless tobacco: Never Used   Substance Use Topics     Alcohol use: No      "Drug use: No     Comment: not anymore, marijuana use occas in past        Medications:      triamcinolone (KENALOG) 0.1 % external cream         Review of Systems   Musculoskeletal:        Right ankle injury, swelling, bruising, and pain.    Neurological: Negative for weakness and numbness.   All other systems reviewed and are negative.      Physical Exam   BP: 130/85  Pulse: 90  Temp: 98.4  F (36.9  C)  Resp: 18  Height: 162.6 cm (5' 4\")  Weight: 79.4 kg (175 lb)      Physical Exam   Constitutional: She is oriented to person, place, and time. She appears well-developed and well-nourished. No distress.   Cardiovascular: Intact distal pulses.   Musculoskeletal:        Right ankle: She exhibits decreased range of motion, swelling and ecchymosis. She exhibits no deformity, no laceration and normal pulse. Tenderness. Lateral malleolus tenderness found. No medial malleolus, no AITFL, no CF ligament, no posterior TFL, no head of 5th metatarsal and no proximal fibula tenderness found. Achilles tendon normal. Achilles tendon exhibits no pain, no defect and normal Julian's test results.        Feet:    No ligament laxity noted in right knee    Neurological: She is alert and oriented to person, place, and time. She has normal strength. Gait (slightly limited by pain ) abnormal.   Skin: Skin is warm, dry and intact. Capillary refill takes less than 2 seconds. Bruising (right posteriolateral ankle. ) noted. She is not diaphoretic. No erythema.   Psychiatric: She has a normal mood and affect. Her behavior is normal. Judgment and thought content normal.   Nursing note and vitals reviewed.      ED Course        Procedures              Critical Care time:  none               Results for orders placed or performed during the hospital encounter of 07/30/19 (from the past 24 hour(s))   Ankle XR, G/E 3 views, right    Narrative    RIGHT ANKLE THREE OR MORE VIEWS  7/30/2019 6:58 PM     HISTORY: Right posterior and lateral ankle pain, " swelling, and  bruising after tripping three days ago and felt a pop. Rule out  fracture.    FINDINGS: Lateral soft tissue swelling.      Impression    IMPRESSION: No fracture identified.       Medications - No data to display    Assessments & Plan (with Medical Decision Making)     I have reviewed the nursing notes.    I have reviewed the findings, diagnosis, plan and need for follow up with the patient.   29-year-old female presents the urgent care with right ankle injury that occurred 3 days ago.  Patient states pain has gradually been improving after she tripped over read while chasing her son, but today felt a pop while she was working and had instant bruising and swelling to the lateral and posterior aspect of the left ankle.  Patient denies worsening pain, difficulty walking, foot pain, ankle pain, or numbness.  See exam findings above.  Patient offered crutches in office today, but declined.  Patient given orthopedic information for further evaluation and treatment.  Patient given Ace wrap and gel ankle splint to right ankle in office today.  Patient to ice, rest, elevate, Tylenol and ibuprofen over-the-counter.  Discussed with patient that differential diagnoses include ankle sprain versus ligament/tendon injury or tear.  No concerning findings or symptoms of Achilles tendon injury in office today.  X-ray obtained and was negative for fracture; lateral soft tissue swelling noted.  Patient given discharge paperwork and discharged in stable condition.       Medication List      There are no discharge medications for this visit.         Final diagnoses:   Ankle injury, left, initial encounter       7/30/2019   [facility] EMERGENCY DEPARTMENT     Opal Lyles PA-C  07/30/19 1936

## 2019-07-30 NOTE — ED AVS SNAPSHOT
[facility] Emergency Department  [address] [address]  Phone:  [phone]  Fax:  [phone]                                    Gloria Rodríguez   MRN: [id number]    Department:  [facility] Emergency Department   Date of Visit:  7/30/2019           After Visit Summary Signature Page    I have received my discharge instructions, and my questions have been answered. I have discussed any challenges I see with this plan with the nurse or doctor.    ..........................................................................................................................................  Patient/Patient Representative Signature      ..........................................................................................................................................  Patient Representative Print Name and Relationship to Patient    ..................................................               ................................................  Date                                   Time    ..........................................................................................................................................  Reviewed by Signature/Title    ...................................................              ..............................................  Date                                               Time          22EPIC Rev 08/18

## 2019-07-31 NOTE — DISCHARGE INSTRUCTIONS
Ace wrap and gel ankle splint to right ankle and foot   Orthopedic referral for further evaluation and treatment.     Patient declined crutches.   Ice, elevate, rest, tylenol and ibuprofen over the counter as needed.     Patient to return if pain out of proportion, numbness, weakness, or change in symptoms occur.    Patient given work note for restrictions until seen by orthopedic specialist.

## 2021-05-15 ENCOUNTER — HOSPITAL ENCOUNTER (EMERGENCY)
Facility: CLINIC | Age: 31
Discharge: HOME | End: 2021-05-15
Attending: FAMILY MEDICINE | Admitting: FAMILY MEDICINE
Payer: OTHER MISCELLANEOUS

## 2021-05-15 ENCOUNTER — APPOINTMENT (OUTPATIENT)
Dept: GENERAL RADIOLOGY | Facility: CLINIC | Age: 31
End: 2021-05-15
Attending: FAMILY MEDICINE
Payer: OTHER MISCELLANEOUS

## 2021-05-15 VITALS
WEIGHT: 203 LBS | HEART RATE: 80 BPM | BODY MASS INDEX: 34.84 KG/M2 | DIASTOLIC BLOOD PRESSURE: 86 MMHG | TEMPERATURE: 98 F | SYSTOLIC BLOOD PRESSURE: 133 MMHG | RESPIRATION RATE: 12 BRPM | OXYGEN SATURATION: 99 %

## 2021-05-15 DIAGNOSIS — S93.401A SPRAIN OF RIGHT ANKLE, UNSPECIFIED LIGAMENT, INITIAL ENCOUNTER: ICD-10-CM

## 2021-05-15 PROCEDURE — 73610 X-RAY EXAM OF ANKLE: CPT | Mod: RT

## 2021-05-15 PROCEDURE — 99284 EMERGENCY DEPT VISIT MOD MDM: CPT | Performed by: FAMILY MEDICINE

## 2021-05-15 PROCEDURE — 99283 EMERGENCY DEPT VISIT LOW MDM: CPT | Performed by: FAMILY MEDICINE

## 2021-05-15 NOTE — DISCHARGE INSTRUCTIONS
Return to the Emergency Room if the following occurs:     Severely worsened pain, or for any concern at anytime.    Or, follow-up with the following provider as we discussed:     Return to your primary doctor next week for reevaluation.  Work restrictions provided from 5/16-5/20.    Medications discussed:    Ibuprofen 600 mg every six hours for pain (7 days duration).  Tylenol 1000 mg every six hours for pain (7 days duration).  Therefore, you can alternate these every three hours and do it safely.    If you received pain-relieving or sedating medication during your time in the ER, avoid alcohol, driving automobiles, or working with machinery.  Also, a responsible adult must stay with you.        Call the Nurse Advice Line at [phone] or [phone] for any concern at anytime.

## 2021-05-15 NOTE — LETTER
[facility] EMERGENCY DEPT  [address] [address]  [phone]      May 15, 2021    Gloria Rodríguez  [address] [address]  [phone] (home) none (work)    : 1990      To Whom it may concern:    Gloria Rodríguez was seen in our Emergency Department today, May 15, 2021 for an injury that was reported to be work related.      The patient should not stand for prolonged periods of time, walk any significant distance, push, pull, carry anything greater than 5 pounds between and including the dates of  and .  Further restrictions can be provided by her primary care doctor, sports medicine, or orthopedics.    Sincerely,    Dimitrios Ugalde MD

## 2021-05-15 NOTE — ED PROVIDER NOTES
HPI   The patient is a 30-year-old female presenting with an injury to her right ankle.  This occurred while she was on the job.  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  She does report a history of multiple ankle injuries on the right over the past 3 years.  She has not required surgery or physical therapy or advanced imaging.  The injury occurred this morning.  She does have pain on the lateral aspect of the ankle.  She is able to ambulate and bear weight but not fully because of pain.  She denies proximal leg pain.  She denies other injury.  Her pain currently is moderate.        Allergies:  Allergies   Allergen Reactions     Fruit Extracts      Bananas, itchy mouth     Ibuprofen Sodium Hives     Oseltamivir      Other reaction(s): Tremors     Sulfa Drugs Hives     No Clinical Screening - See Comments Rash     bleach     Silver Rash     Sodium Hypochlorite Other (See Comments) and Rash     Problem List:    Patient Active Problem List    Diagnosis Date Noted     CARDIOVASCULAR SCREENING; LDL GOAL LESS THAN 130      Priority: Medium     NO ACTIVE PROBLEMS 11/15/2012     Priority: Medium      Past Medical History:    Past Medical History:   Diagnosis Date     NO ACTIVE PROBLEMS      Past Surgical History:    Past Surgical History:   Procedure Laterality Date     NO HISTORY OF SURGERY       Family History:    No family history on file.  Social History:  Marital Status:  Single [1]  Social History     Tobacco Use     Smoking status: Never Smoker     Smokeless tobacco: Never Used   Substance Use Topics     Alcohol use: No     Drug use: No     Comment: not anymore, marijuana use occas in past      Medications:    No current outpatient medications on file.    Review of Systems   All other systems reviewed and are negative.      PE   BP: 133/86  Pulse: 80  Temp: 98  F (36.7  C)  Resp: 12  Weight: 92.1 kg (203 lb)  SpO2: 99 %  Physical Exam  Vitals signs reviewed.    Constitutional:       General: She is not in acute distress.     Appearance: She is well-developed.   HENT:      Head: Normocephalic and atraumatic.      Right Ear: External ear normal.      Left Ear: External ear normal.      Nose: Nose normal.      Mouth/Throat:      Mouth: Mucous membranes are moist.      Pharynx: Oropharynx is clear.   Eyes:      Extraocular Movements: Extraocular movements intact.      Conjunctiva/sclera: Conjunctivae normal.      Pupils: Pupils are equal, round, and reactive to light.   Neck:      Musculoskeletal: Normal range of motion.   Cardiovascular:      Rate and Rhythm: Normal rate and regular rhythm.   Pulmonary:      Effort: Pulmonary effort is normal.   Musculoskeletal:      Comments: There is swelling over the right lateral malleolus.  She has tenderness here as well.  The Achilles tendon is full and without defect.  She is able to plantarflex and dorsiflex with normal range of motion.  She does have pain involving the right lateral ankle while doing this.  No foot tenderness, swelling, or deformity.  No proximal fibula tenderness.   Skin:     General: Skin is warm and dry.   Neurological:      Mental Status: She is alert and oriented to person, place, and time.   Psychiatric:         Behavior: Behavior normal.         ED COURSE and MDM   1139.  The patient has a right ankle injury.  X-ray pending.  She refuses Tylenol.    1214.  X-ray shows normal bone alignment and no evidence of fracture.  Ankle sprain.  She does not want crutches as she has them at home.  She has a brace at home.  Work restrictions provided.  Ibuprofen and Tylenol for pain control.    LABS  Labs Ordered and Resulted from Time of ED Arrival Up to the Time of Departure from the ED - No data to display    IMAGING  Images reviewed by me.  Radiology report also reviewed.  XR Ankle Right G/E 3 Views   Preliminary Result   IMPRESSION:   1.  No fracture or joint malalignment.   2.  Moderate soft tissue swelling in the  lateral ankle.          Procedures    Medications - No data to display      IMPRESSION       ICD-10-CM    1. Sprain of right ankle, unspecified ligament, initial encounter  S93.401A             Medication List      There are no discharge medications for this visit.                       Dimitrios Ugalde MD  05/15/21 8435

## 2021-07-02 ENCOUNTER — OFFICE VISIT (OUTPATIENT)
Dept: FAMILY MEDICINE | Facility: CLINIC | Age: 31
End: 2021-07-02
Payer: COMMERCIAL

## 2021-07-02 VITALS
DIASTOLIC BLOOD PRESSURE: 70 MMHG | BODY MASS INDEX: 35.34 KG/M2 | HEART RATE: 60 BPM | RESPIRATION RATE: 16 BRPM | HEIGHT: 64 IN | SYSTOLIC BLOOD PRESSURE: 120 MMHG | WEIGHT: 207 LBS | TEMPERATURE: 97.8 F

## 2021-07-02 DIAGNOSIS — M67.431 GANGLION CYST OF WRIST, RIGHT: Primary | ICD-10-CM

## 2021-07-02 PROCEDURE — 99203 OFFICE O/P NEW LOW 30 MIN: CPT | Performed by: NURSE PRACTITIONER

## 2021-07-02 ASSESSMENT — MIFFLIN-ST. JEOR: SCORE: 1653.95

## 2021-07-02 NOTE — PATIENT INSTRUCTIONS
Ortho referral placed    You are due for a physical, please schedule at your earliest convenience.

## 2021-07-02 NOTE — PROGRESS NOTES
"  Assessment & Plan     Ganglion cyst of wrist, right  Concern for a small cyst of wrist, ortho referral placed. Symptomatic care and follow up discussed  - Orthopedic  Referral; Future      Return in about 1 week (around 7/9/2021), or if symptoms worsen or fail to improve.    MACARENA Barrios CNP  [facility]    Yair Castro is a 30 year old who presents for the following health issues     HPI     Concern - Wrist Lump   Onset: 2 month worse over last few days   Description: lump right wrist   Intensity: moderate  Progression of Symptoms:  worsening  Accompanying Signs & Symptoms: painful at times   Previous history of similar problem: lump on left elbow   Therapies tried and outcome:  none       Review of Systems   Constitutional, HEENT, cardiovascular, pulmonary, gi and gu systems are negative, except as otherwise noted.      Objective    /70 (Cuff Size: Adult Large)   Pulse 60   Temp 97.8  F (36.6  C) (Tympanic)   Resp 16   Ht 1.626 m (5' 4\")   Wt 93.9 kg (207 lb)   BMI 35.53 kg/m    Body mass index is 35.53 kg/m .  Physical Exam   GENERAL: healthy, alert and no distress  SKIN: firm mass right wrist  PSYCH: mentation appears normal, affect normal/bright                "

## 2021-07-20 ENCOUNTER — OFFICE VISIT (OUTPATIENT)
Dept: ORTHOPEDICS | Facility: CLINIC | Age: 31
End: 2021-07-20
Attending: NURSE PRACTITIONER
Payer: COMMERCIAL

## 2021-07-20 VITALS
DIASTOLIC BLOOD PRESSURE: 70 MMHG | HEIGHT: 64 IN | WEIGHT: 207 LBS | SYSTOLIC BLOOD PRESSURE: 115 MMHG | BODY MASS INDEX: 35.34 KG/M2

## 2021-07-20 DIAGNOSIS — M25.532 LEFT WRIST PAIN: ICD-10-CM

## 2021-07-20 DIAGNOSIS — M65.4 DE QUERVAIN'S TENOSYNOVITIS, LEFT: Primary | ICD-10-CM

## 2021-07-20 PROCEDURE — 99243 OFF/OP CNSLTJ NEW/EST LOW 30: CPT | Performed by: FAMILY MEDICINE

## 2021-07-20 ASSESSMENT — MIFFLIN-ST. JEOR: SCORE: 1653.95

## 2021-07-20 NOTE — LETTER
"    2021         RE: Gloria Tsain  [address] [place] [id number]        Dear Colleague,    Thank you for referring your patient, Gloria Rodríguez, to the [facility] [place]. Please see a copy of my visit note below.    Gloria Rodríguez  :  1990  DOS: 2021  MRN: [id number]    Sports Medicine Clinic Visit    PCP: No Ref-Primary, Physician    Gloria Rodríguez is a 30 year old Right hand dominant female who is seen in consultation at the request of  MACARENA Barrios presenting with right wrist pain and soft tissue mass.    Injury: Increased wrist pain over the course of a few months, then significantly worse about 3 weeks ago with increased swelling over the base of the thumb and wrist.  Swelling has since decreased, but continues to have mild pain and a small cystlike swelling over the wrist.  Has tried ice and ibuprofen.  Improved with rest.  No significant associated injury.  No remote injury or surgeries.  Does have repetitive activity at work for [employer].    Social History: works for [employer]    Review of Systems  Musculoskeletal: as above  Remainder of review of systems is negative including constitutional, CV, pulmonary, GI, Skin and Neurologic except as noted in HPI or medical history.    Past Medical History:   Diagnosis Date     NO ACTIVE PROBLEMS      Past Surgical History:   Procedure Laterality Date     NO HISTORY OF SURGERY       No family history on file.    Objective  /70   Ht 1.626 m (5' 4\")   Wt 93.9 kg (207 lb)   BMI 35.53 kg/m        General: healthy, alert and in no distress      HEENT: no scleral icterus or conjunctival erythema     Skin: no suspicious lesions or rash. No jaundice.     CV: regular rhythm by palpation, 2+ distal pulses, no pedal edema      Resp: normal respiratory effort without conversational dyspnea     Psych: normal mood and affect      Gait: nonantalgic, appropriate coordination and balance     Neuro: normal light " touch sensory exam of the extremities. Motor strength as noted below     Right Wrist and Hand exam    Inspection:       Swelling: very mild over radial styloid area    Tender:       Nodular swelling over the 1st dorsal compartment tendons, firm, mobile, mildly tender      Mild TTP of the 1st dorsal compartment    Non Tender:       Remainder of the Wrist and Hand right    ROM:       Full and symmetric active and passive range of motion of the forearm, wrist and digits bilateral    Strength:       5/5 strength in the muscles of the hand, wrist and forearm bilateral    Special Tests:        neg (-) Tinel's test right,       neg (-) Phalen's test bilateral and       positive (+) Finkelstein's maneuver right    Neurovascular:       2+ radial pulses bilaterally with brisk capillary refill and      normal sensation to light touch in the radial, median and ulnar nerve distributions      Radiology:  No XR imaging needed today.  Bedside US reveals very small fluid-filled cyst, less than 0.5cm, overlying/originating from the 1st dorsal compartment, very mild associated fluid under the tendon sheath    Assessment:  1. De Quervain's tenosynovitis, left    2. Left wrist pain        Plan:  Discussed the assessment with the patient.  Follow up: prn  Pain is resolving, as is the swelling over the radial wrist  Signs of 1st dorsal compartment tenosynovitis  Thumb spica brace options and strategies reviewed  Activity modification reviewed  Risk factors reviewed including her work for [employer] doing repetitive work  Option for CSI +/- aspiration of currently very small cyst that appear confluent with 1st dorsal compartment, defer for now given clinical progress  OTC pain relievers like topical voltaren gel reviewed  We discussed modified progressive pain-free activity as tolerated  OT available prn, defer for now  Home handouts provided and supportive care reviewed  All questions were answered today  Contact us with additional questions or  concerns  Signs and sx of concern reviewed    Thanks very much for sending this nice lady to us, I will keep you updated with her progress      Gurmeet Rodriguez DO, [facility]  Sports Medicine Physician  [facility]            Disclaimer: This note consists of symbols derived from keyboarding, dictation and/or voice recognition software. As a result, there may be errors in the script that have gone undetected. Please consider this when interpreting information found in this chart.      Again, thank you for allowing me to participate in the care of your patient.        Sincerely,        Gurmeet Rodriguez DO

## 2021-07-20 NOTE — PROGRESS NOTES
"Gloria Tsain  :  1990  DOS: 2021  MRN: [id number]    Sports Medicine Clinic Visit    PCP: No Ref-Primary, Physician    Gloria Rodríguez is a 30 year old Right hand dominant female who is seen in consultation at the request of  MACARENA Barrios presenting with right wrist pain and soft tissue mass.    Injury: Increased wrist pain over the course of a few months, then significantly worse about 3 weeks ago with increased swelling over the base of the thumb and wrist.  Swelling has since decreased, but continues to have mild pain and a small cystlike swelling over the wrist.  Has tried ice and ibuprofen.  Improved with rest.  No significant associated injury.  No remote injury or surgeries.  Does have repetitive activity at work for [employer].    Social History: works for [employer]    Review of Systems  Musculoskeletal: as above  Remainder of review of systems is negative including constitutional, CV, pulmonary, GI, Skin and Neurologic except as noted in HPI or medical history.    Past Medical History:   Diagnosis Date     NO ACTIVE PROBLEMS      Past Surgical History:   Procedure Laterality Date     NO HISTORY OF SURGERY       No family history on file.    Objective  /70   Ht 1.626 m (5' 4\")   Wt 93.9 kg (207 lb)   BMI 35.53 kg/m        General: healthy, alert and in no distress      HEENT: no scleral icterus or conjunctival erythema     Skin: no suspicious lesions or rash. No jaundice.     CV: regular rhythm by palpation, 2+ distal pulses, no pedal edema      Resp: normal respiratory effort without conversational dyspnea     Psych: normal mood and affect      Gait: nonantalgic, appropriate coordination and balance     Neuro: normal light touch sensory exam of the extremities. Motor strength as noted below     Right Wrist and Hand exam    Inspection:       Swelling: very mild over radial styloid area    Tender:       Nodular swelling over the 1st dorsal compartment tendons, firm, mobile, mildly tender   "    Mild TTP of the 1st dorsal compartment    Non Tender:       Remainder of the Wrist and Hand right    ROM:       Full and symmetric active and passive range of motion of the forearm, wrist and digits bilateral    Strength:       5/5 strength in the muscles of the hand, wrist and forearm bilateral    Special Tests:        neg (-) Tinel's test right,       neg (-) Phalen's test bilateral and       positive (+) Finkelstein's maneuver right    Neurovascular:       2+ radial pulses bilaterally with brisk capillary refill and      normal sensation to light touch in the radial, median and ulnar nerve distributions      Radiology:  No XR imaging needed today.  Bedside US reveals very small fluid-filled cyst, less than 0.5cm, overlying/originating from the 1st dorsal compartment, very mild associated fluid under the tendon sheath    Assessment:  1. De Quervain's tenosynovitis, left    2. Left wrist pain        Plan:  Discussed the assessment with the patient.  Follow up: prn  Pain is resolving, as is the swelling over the radial wrist  Signs of 1st dorsal compartment tenosynovitis  Thumb spica brace options and strategies reviewed  Activity modification reviewed  Risk factors reviewed including her work for [employer] doing repetitive work  Option for CSI +/- aspiration of currently very small cyst that appear confluent with 1st dorsal compartment, defer for now given clinical progress  OTC pain relievers like topical voltaren gel reviewed  We discussed modified progressive pain-free activity as tolerated  OT available prn, defer for now  Home handouts provided and supportive care reviewed  All questions were answered today  Contact us with additional questions or concerns  Signs and sx of concern reviewed    Thanks very much for sending this nice lady to us, I will keep you updated with her progress      Gurmeet Rodriguez DO, CAQ  Sports Medicine Physician  [facility]            Disclaimer: This  note consists of symbols derived from keyboarding, dictation and/or voice recognition software. As a result, there may be errors in the script that have gone undetected. Please consider this when interpreting information found in this chart.

## 2021-08-06 ENCOUNTER — OFFICE VISIT (OUTPATIENT)
Dept: FAMILY MEDICINE | Facility: CLINIC | Age: 31
End: 2021-08-06
Payer: COMMERCIAL

## 2021-08-06 VITALS
BODY MASS INDEX: 35 KG/M2 | OXYGEN SATURATION: 98 % | HEART RATE: 69 BPM | SYSTOLIC BLOOD PRESSURE: 128 MMHG | WEIGHT: 205 LBS | DIASTOLIC BLOOD PRESSURE: 70 MMHG | HEIGHT: 64 IN | TEMPERATURE: 97.8 F

## 2021-08-06 DIAGNOSIS — F41.9 ANXIETY: ICD-10-CM

## 2021-08-06 DIAGNOSIS — Z00.00 ROUTINE GENERAL MEDICAL EXAMINATION AT A HEALTH CARE FACILITY: Primary | ICD-10-CM

## 2021-08-06 PROCEDURE — 99395 PREV VISIT EST AGE 18-39: CPT | Performed by: NURSE PRACTITIONER

## 2021-08-06 PROCEDURE — 99213 OFFICE O/P EST LOW 20 MIN: CPT | Mod: 25 | Performed by: NURSE PRACTITIONER

## 2021-08-06 PROCEDURE — 87624 HPV HI-RISK TYP POOLED RSLT: CPT | Performed by: NURSE PRACTITIONER

## 2021-08-06 PROCEDURE — G0145 SCR C/V CYTO,THINLAYER,RESCR: HCPCS | Performed by: NURSE PRACTITIONER

## 2021-08-06 RX ORDER — SERTRALINE HYDROCHLORIDE 25 MG/1
25 TABLET, FILM COATED ORAL DAILY
Qty: 30 TABLET | Refills: 2 | Status: SHIPPED | OUTPATIENT
Start: 2021-08-06 | End: 2021-09-10

## 2021-08-06 ASSESSMENT — ENCOUNTER SYMPTOMS
WEAKNESS: 0
PARESTHESIAS: 0
DIZZINESS: 0
PALPITATIONS: 0
HEADACHES: 0
FREQUENCY: 0
ARTHRALGIAS: 0
SHORTNESS OF BREATH: 0
JOINT SWELLING: 0
BREAST MASS: 0
DIARRHEA: 0
MYALGIAS: 0
SORE THROAT: 0
FEVER: 0
CONSTIPATION: 0
HEMATURIA: 0
NERVOUS/ANXIOUS: 1
HEMATOCHEZIA: 0
DYSURIA: 0
NAUSEA: 0
CHILLS: 0
EYE PAIN: 0
ABDOMINAL PAIN: 0
COUGH: 0
HEARTBURN: 1

## 2021-08-06 ASSESSMENT — MIFFLIN-ST. JEOR: SCORE: 1636.38
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
## 2021-08-06 NOTE — PATIENT INSTRUCTIONS
Start the sertraline 25 mg daily    Recheck in 1-2 months, sooner if needed    Preventive Health Recommendations  Female Ages 26 - 39  Yearly exam:   See your health care provider every year in order to    Review health changes.     Discuss preventive care.      Review your medicines if you your doctor has prescribed any.    Until age 30: Get a Pap test every three years (more often if you have had an abnormal result).    After age 30: Talk to your doctor about whether you should have a Pap test every 3 years or have a Pap test with HPV screening every 5 years.   You do not need a Pap test if your uterus was removed (hysterectomy) and you have not had cancer.  You should be tested each year for STDs (sexually transmitted diseases), if you're at risk.   Talk to your provider about how often to have your cholesterol checked.  If you are at risk for diabetes, you should have a diabetes test (fasting glucose).  Shots: Get a flu shot each year. Get a tetanus shot every 10 years.   Nutrition:     Eat at least 5 servings of fruits and vegetables each day.    Eat whole-grain bread, whole-wheat pasta and brown rice instead of white grains and rice.    Get adequate Calcium and Vitamin D.     Lifestyle    Exercise at least 150 minutes a week (30 minutes a day, 5 days of the week). This will help you control your weight and prevent disease.    Limit alcohol to one drink per day.    No smoking.     Wear sunscreen to prevent skin cancer.    See your dentist every six months for an exam and cleaning.

## 2021-08-10 LAB
BKR LAB AP GYN ADEQUACY: NORMAL
BKR LAB AP GYN INTERPRETATION: NORMAL
BKR LAB AP HPV REFLEX: NORMAL
BKR LAB AP PREVIOUS ABNORMAL: NORMAL
PATH REPORT.COMMENTS IMP SPEC: NORMAL
PATH REPORT.RELEVANT HX SPEC: NORMAL

## 2021-08-12 LAB
HUMAN PAPILLOMA VIRUS 16 DNA: NEGATIVE
HUMAN PAPILLOMA VIRUS 18 DNA: NEGATIVE
HUMAN PAPILLOMA VIRUS FINAL DIAGNOSIS: NORMAL
HUMAN PAPILLOMA VIRUS OTHER HR: NEGATIVE

## 2021-09-10 ENCOUNTER — OFFICE VISIT (OUTPATIENT)
Dept: FAMILY MEDICINE | Facility: CLINIC | Age: 31
End: 2021-09-10
Payer: COMMERCIAL

## 2021-09-10 VITALS
DIASTOLIC BLOOD PRESSURE: 70 MMHG | HEIGHT: 64 IN | BODY MASS INDEX: 35.34 KG/M2 | HEART RATE: 80 BPM | RESPIRATION RATE: 16 BRPM | TEMPERATURE: 98.6 F | SYSTOLIC BLOOD PRESSURE: 110 MMHG | WEIGHT: 207 LBS

## 2021-09-10 DIAGNOSIS — F41.9 ANXIETY: ICD-10-CM

## 2021-09-10 PROCEDURE — 99213 OFFICE O/P EST LOW 20 MIN: CPT | Performed by: NURSE PRACTITIONER

## 2021-09-10 RX ORDER — SERTRALINE HYDROCHLORIDE 25 MG/1
25 TABLET, FILM COATED ORAL DAILY
Qty: 90 TABLET | Status: CANCELLED | OUTPATIENT
Start: 2021-09-10

## 2021-09-10 ASSESSMENT — PATIENT HEALTH QUESTIONNAIRE - PHQ9
SUM OF ALL RESPONSES TO PHQ QUESTIONS 1-9: 1
5. POOR APPETITE OR OVEREATING: SEVERAL DAYS

## 2021-09-10 ASSESSMENT — MIFFLIN-ST. JEOR: SCORE: 1644.98

## 2021-09-10 ASSESSMENT — ANXIETY QUESTIONNAIRES
3. WORRYING TOO MUCH ABOUT DIFFERENT THINGS: SEVERAL DAYS
GAD7 TOTAL SCORE: 4
5. BEING SO RESTLESS THAT IT IS HARD TO SIT STILL: NOT AT ALL
2. NOT BEING ABLE TO STOP OR CONTROL WORRYING: SEVERAL DAYS
7. FEELING AFRAID AS IF SOMETHING AWFUL MIGHT HAPPEN: NOT AT ALL
1. FEELING NERVOUS, ANXIOUS, OR ON EDGE: SEVERAL DAYS
6. BECOMING EASILY ANNOYED OR IRRITABLE: NOT AT ALL

## 2021-09-10 NOTE — PROGRESS NOTES
"  Assessment & Plan     Anxiety  Improvement in symptoms since last visit.  Plan to continue off medication and monitor.  Practice deep breathing, exercise and consider calming monica when feeling anxious.  Follow up if symptoms do not improve or worsen.      Return in about 4 weeks (around 10/8/2021), or if symptoms worsen or fail to improve.    MACARENA Barrios CNP  [facility]    Yair Castro is a 31 year old who presents for the following health issues     HPI     Anxiety Follow-Up    How are you doing with your anxiety since your last visit? Same- stopped taking Zoloft after 5 days due to vomiting from it.     Are you having other symptoms that might be associated with anxiety? Yes:  . racing heart, panic attacks     Have you had a significant life event? OTHER: . Daughter is back home, work is more busy.     Are you feeling depressed? No    Do you have any concerns with your use of alcohol or other drugs? No    Social History     Tobacco Use     Smoking status: Never Smoker     Smokeless tobacco: Never Used   Substance Use Topics     Alcohol use: No     Drug use: No     Comment: not anymore, marijuana use occas in past       Review of Systems   Constitutional, HEENT, cardiovascular, pulmonary, gi and gu systems are negative, except as otherwise noted.      Objective    /70 (Cuff Size: Adult Large)   Pulse 80   Temp 98.6  F (37  C) (Tympanic)   Resp 16   Ht 1.619 m (5' 3.75\")   Wt 93.9 kg (207 lb)   BMI 35.81 kg/m    Body mass index is 35.81 kg/m .  Physical Exam   GENERAL: healthy, alert and no distress  PSYCH: mentation appears normal, affect normal/bright        "

## 2021-09-11 ASSESSMENT — ANXIETY QUESTIONNAIRES: GAD7 TOTAL SCORE: 4

## 2021-09-30 ENCOUNTER — HOSPITAL ENCOUNTER (EMERGENCY)
Facility: CLINIC | Age: 31
Discharge: HOME | End: 2021-09-30
Attending: EMERGENCY MEDICINE | Admitting: EMERGENCY MEDICINE
Payer: COMMERCIAL

## 2021-09-30 VITALS
TEMPERATURE: 98.9 F | WEIGHT: 205 LBS | RESPIRATION RATE: 18 BRPM | DIASTOLIC BLOOD PRESSURE: 87 MMHG | OXYGEN SATURATION: 98 % | BODY MASS INDEX: 36.32 KG/M2 | HEIGHT: 63 IN | SYSTOLIC BLOOD PRESSURE: 132 MMHG | HEART RATE: 91 BPM

## 2021-09-30 DIAGNOSIS — R09.81 NASAL CONGESTION: ICD-10-CM

## 2021-09-30 DIAGNOSIS — J06.9 VIRAL URI: ICD-10-CM

## 2021-09-30 PROBLEM — O24.410 DIET CONTROLLED GESTATIONAL DIABETES MELLITUS (GDM), ANTEPARTUM: Status: ACTIVE | Noted: 2018-04-04

## 2021-09-30 PROCEDURE — 250N000013 HC RX MED GY IP 250 OP 250 PS 637: Performed by: EMERGENCY MEDICINE

## 2021-09-30 PROCEDURE — U0003 INFECTIOUS AGENT DETECTION BY NUCLEIC ACID (DNA OR RNA); SEVERE ACUTE RESPIRATORY SYNDROME CORONAVIRUS 2 (SARS-COV-2) (CORONAVIRUS DISEASE [COVID-19]), AMPLIFIED PROBE TECHNIQUE, MAKING USE OF HIGH THROUGHPUT TECHNOLOGIES AS DESCRIBED BY CMS-2020-01-R: HCPCS | Performed by: EMERGENCY MEDICINE

## 2021-09-30 PROCEDURE — 250N000009 HC RX 250: Performed by: EMERGENCY MEDICINE

## 2021-09-30 PROCEDURE — 99284 EMERGENCY DEPT VISIT MOD MDM: CPT | Performed by: EMERGENCY MEDICINE

## 2021-09-30 PROCEDURE — C9803 HOPD COVID-19 SPEC COLLECT: HCPCS | Performed by: EMERGENCY MEDICINE

## 2021-09-30 PROCEDURE — 99283 EMERGENCY DEPT VISIT LOW MDM: CPT | Performed by: EMERGENCY MEDICINE

## 2021-09-30 RX ORDER — OXYMETAZOLINE HYDROCHLORIDE 0.05 G/100ML
2 SPRAY NASAL 2 TIMES DAILY
Status: DISCONTINUED | OUTPATIENT
Start: 2021-09-30 | End: 2021-09-30 | Stop reason: HOSPADM

## 2021-09-30 RX ORDER — OXYMETAZOLINE HCL 0.05 %
2 SPRAY, NON-AEROSOL (ML) NASAL 2 TIMES DAILY
Refills: 0 | COMMUNITY
Start: 2021-09-30 | End: 2021-10-03

## 2021-09-30 RX ORDER — FLUTICASONE PROPIONATE 50 MCG
2 SPRAY, SUSPENSION (ML) NASAL DAILY
Status: DISCONTINUED | OUTPATIENT
Start: 2021-09-30 | End: 2021-09-30 | Stop reason: HOSPADM

## 2021-09-30 RX ORDER — ACETAMINOPHEN 500 MG
1000 TABLET ORAL EVERY 8 HOURS PRN
Qty: 30 TABLET | Refills: 0 | COMMUNITY
Start: 2021-09-30 | End: 2021-10-05

## 2021-09-30 RX ADMIN — FLUTICASONE PROPIONATE 2 SPRAY: 50 SPRAY, METERED NASAL at 21:39

## 2021-09-30 RX ADMIN — Medication 2 SPRAY: at 21:37

## 2021-09-30 ASSESSMENT — ENCOUNTER SYMPTOMS
VOMITING: 0
SORE THROAT: 0
VOICE CHANGE: 0
COUGH: 0
FATIGUE: 0
CHILLS: 0
FACIAL SWELLING: 0
FEVER: 0
LIGHT-HEADEDNESS: 0
DIARRHEA: 0
BACK PAIN: 0
TROUBLE SWALLOWING: 0
NECK STIFFNESS: 0
WOUND: 0
HEADACHES: 0
SHORTNESS OF BREATH: 0
SINUS PRESSURE: 1
WEAKNESS: 0
NAUSEA: 0
APPETITE CHANGE: 0
RHINORRHEA: 1
NECK PAIN: 0
DYSURIA: 0

## 2021-09-30 ASSESSMENT — MIFFLIN-ST. JEOR: SCORE: 1624

## 2021-10-01 ENCOUNTER — TELEPHONE (OUTPATIENT)
Dept: EMERGENCY MEDICINE | Facility: CLINIC | Age: 31
End: 2021-10-01

## 2021-10-01 LAB — SARS-COV-2 RNA RESP QL NAA+PROBE: POSITIVE

## 2021-10-01 NOTE — DISCHARGE INSTRUCTIONS
Consider utilizing saline irrigation of your nose to help relieve nasal congestion.  You can utilize a device such as a She pot or Nasaline along with saline solution you can purchase at a pharmacy over-the-counter for nasal irrigation.

## 2021-10-01 NOTE — TELEPHONE ENCOUNTER
"-Coronavirus (COVID-19) Notification    Caller Name (Patient, parent, daughter/son, grandparent, etc)  Patient     Reason for call  Notify of Positive Coronavirus (COVID-19) lab results, assess symptoms,  review  [facility] recommendations    Lab Result    Lab test:  2019-nCoV rRt-PCR or SARS-CoV-2 PCR    Oropharyngeal AND/OR nasopharyngeal swabs is POSITIVE for 2019-nCoV RNA/SARS-COV-2 PCR (COVID-19 virus)    RN Recommendations/Instructions per [facility] Coronavirus COVID-19 recommendations    Brief introduction script  Introduce self then review script:  \"I am calling on behalf of [facility]  We were notified that your Coronavirus test (COVID-19) for was POSITIVE for the virus.  I have some information to relay to you but first I wanted to mention that the [place] Dept of Health will be contacting you shortly [it's possible MD already called Patient] to talk to you more about how you are feeling and other people you have had contact with who might now also have the virus.  Also,  [facility] is Partnering with the [facility] for Covid-19 research, you may be contacted directly by research staff.\"    Assessment (Inquire about Patient's current symptoms)   Assessment   Current Symptoms at time of phone call: (if no symptoms, document No symptoms] Nasal congestion, sinus pressure and headaches    Symptoms onset (if applicable) 9/26/2021     If at time of call, Patients symptoms hare worsened, the Patient should contact 911 or have someone drive them to Emergency Dept promptly:      If Patient calling 911, inform 911 personal that you have tested positive for the Coronavirus (COVID-19).  Place mask on and await 911 to arrive.    If Emergency Dept, If possible, please have another adult drive you to the Emergency Dept but you need to wear mask when in contact with other people.      Monoclonal Antibody Administration    You may be eligible to receive a new treatment with a monoclonal " "antibody for preventing hospitalization in patients at high risk for complications from COVID-19.   This medication is still experimental and available on a limited basis; it is given through an IV and must be given at an infusion center. Please note that not all people who are eligible will receive the medication since it is in limited supply.     Are you interested in being considered for this medication?  No.   Does the patient fit the criteria: No    If patient qualifies based on above criteria:  \"You will be contacted if you are selected to receive this treatment in the next 1-2 business days.   This is time sensitive and if you are not selected in the next 1-2 business days, you will not receive the medication.  If you do not receive a call to schedule, you have not been selected.\"      Review information with Patient    Your result was positive. This means you have COVID-19 (coronavirus).  We have sent you a letter that reviews the information that I'll be reviewing with you now.    How can I protect others?    If you have symptoms: stay home and away from others (self-isolate) until:    You've had no fever--and no medicine that reduces fever--for 1 full day (24 hours). And       Your other symptoms have gotten better. For example, your cough or breathing has improved. And     At least 10 days have passed since your symptoms started. (If you've been told by a doctor that you have a weak immune system, wait 20 days.)     If you don't have symptoms: Stay home and away from others (self-isolate) until at least 10 days have passed since your first positive COVID-19 test. (Date test collected)    During this time:    Stay in your own room, including for meals. Use your own bathroom if you can.    Stay away from others in your home. No hugging, kissing or shaking hands. No visitors.     Don't go to work, school or anywhere else.     Clean  high touch  surfaces often (doorknobs, counters, handles, etc.). Use a " household cleaning spray or wipes. You'll find a full list on the EPA website at www.epa.gov/pesticide-registration/list-n-disinfectants-use-against-sars-cov-2.     Cover your mouth and nose with a mask, tissue or other face covering to avoid spreading germs.    Wash your hands and face often with soap and water.    Make a list of people you have been in close contact with recently, even if either of you wore a face covering.   ; Start your list from 2 days before you became ill or had a positive test.  ; Include anyone that was within 6 feet of you for a cumulative total of 15 minutes or more in 24 hours. (Example: if you sat next to Lucius for 5 minutes in the morning and 10 minutes in the afternoon, then you were in close contact for 15 minutes total that day. Lucius would be added to your list.)    A public health worker will call or text you. It is important that you answer. They will ask you questions about possible exposures to COVID-19, such as people you have been in direct contact with and places you have visited.    Tell the people on your list that you have COVID-19; they should stay away from others for 14 days starting from the last time they were in contact with you (unless you are told something different from a public health worker).     Caregivers in these groups are at risk for severe illness due to COVID-19:  o People 65 years and older  o People who live in a nursing home or long-term care facility  o People with chronic disease (lung, heart, cancer, diabetes, kidney, liver, immunologic)  o People who have a weakened immune system, including those who:  - Are in cancer treatment  - Take medicine that weakens the immune system, such as corticosteroids  - Had a bone marrow or organ transplant  - Have an immune deficiency  - Have poorly controlled HIV or AIDS  - Are obese (body mass index of 40 or higher)  - Smoke regularly    Caregivers should wear gloves while washing dishes, handling laundry and  cleaning bedrooms and bathrooms.    Wash and dry laundry with special caution. Don't shake dirty laundry, and use the warmest water setting you can.    If you have a weakened immune system, ask your doctor about other actions you should take.    For more tips, go to www.cdc.gov/coronavirus/2019-ncov/downloads/10Things.pdf.    You should not go back to work until you meet the guidelines above for ending your home isolation. You don't need to be retested for COVID-19 before going back to work--studies show that you won't spread the virus if it's been at least 10 days since your symptoms started (or 20 days, if you have a weak immune system).    Employers: This document serves as formal notice of your employee's medical guidelines for going back to work. They must meet the above guidelines before going back to work in person.    How can I take care of myself?    1. Get lots of rest. Drink extra fluids (unless a doctor has told you not to).    2. Take Tylenol (acetaminophen) for fever or pain. If you have liver or kidney problems, ask your family doctor if it's okay to take Tylenol.     Take either:     650 mg (two 325 mg pills) every 4 to 6 hours, or     1,000 mg (two 500 mg pills) every 8 hours as needed.     Note: Don't take more than 3,000 mg in one day. Acetaminophen is found in many medicines (both prescribed and over-the-counter medicines). Read all labels to be sure you don't take too much.    For children, check the Tylenol bottle for the right dose (based on their age or weight).    3. If you have other health problems (like cancer, heart failure, an organ transplant or severe kidney disease): Call your specialty clinic if you don't feel better in the next 2 days.    4. Know when to call 911: Emergency warning signs include:    Trouble breathing or shortness of breath    Pain or pressure in the chest that doesn't go away    Feeling confused like you haven't felt before, or not being able to wake  up    Bluish-colored lips or face    5. Sign up for [employer]. We know it's scary to hear that you have COVID-19. We want to track your symptoms to make sure you're okay over the next 2 weeks. Please look for an email from [employer]--this is a free, online program that we'll use to keep in touch. To sign up, follow the link in the email. Learn more at [email or web address].    Where can I get more information?    [facility]: [email or web address]    Coronavirus Basics: www.[email or web address]./diseases/coronavirus/basics.html    What to Do If You're Sick: www.cdc.gov/coronavirus/2019-ncov/about/steps-when-sick.html    Ending Home Isolation: www.cdc.gov/coronavirus/2019-ncov/hcp/disposition-in-home-patients.html     Caring for Someone with COVID-19: www.cdc.gov/coronavirus/2019-ncov/if-you-are-sick/care-for-someone.html     [facility] clinical trials (COVID-19 research studies): [email or web address]-[email or web address]     A Positive COVID-19 letter will be sent via [employer] or the mail. (Exception, no letters sent to Presurgerical/Preprocedure Patients)    Karlene Langford LPN

## 2021-10-01 NOTE — ED PROVIDER NOTES
History     Chief Complaint   Patient presents with     Nasal Congestion     HPI  Gloria Rodríguez is a 31 year old female with no significant contributing past medical history presenting for evaluation of nasal congestion and rhinorrhea over the past 4 days.  Symptoms initially mild with nasal congestion and rhinorrhea.  Has gotten somewhat worse over the past 2 days.  Reports some mild sinus tenderness and tenderness in her jaw.  Denies headache.  Denies fever or chills.  Has had some decrease in taste and smell over the past 2 days prompting her to come in for evaluation.  Patient has been fully for Covid back in May.  Denies any cough or difficulty breathing.  No chest tightness, abdominal pain, nausea, vomiting, or diarrhea.  Otherwise reports feeling well.  Has been using Sudafed and Tylenol as needed for symptoms with minimal improvement.  Patient reports her discomfort in her face is rated about a 4 or 5 out of 10.    Allergies:  Allergies   Allergen Reactions     Fruit Extracts      Bananas, itchy mouth     Ibuprofen Sodium Hives     Oseltamivir      Other reaction(s): Tremors     Sulfa Drugs Hives     Other [No Clinical Screening - See Comments] Rash     bleach     Silver Rash     Sodium Hypochlorite Other (See Comments) and Rash       Problem List:    Patient Active Problem List    Diagnosis Date Noted     Diet controlled gestational diabetes mellitus (GDM), antepartum 2018     Priority: Medium     S/P repeat low transverse  2018     Priority: Medium     Prenatal care, subsequent pregnancy 10/06/2017     Priority: Medium     Pilonidal cyst 2015     Priority: Medium     CARDIOVASCULAR SCREENING; LDL GOAL LESS THAN 130      Priority: Medium     NO ACTIVE PROBLEMS 11/15/2012     Priority: Medium        Past Medical History:    Past Medical History:   Diagnosis Date     NO ACTIVE PROBLEMS        Past Surgical History:    Past Surgical History:   Procedure Laterality Date     NO  "HISTORY OF SURGERY         Family History:    Family History   Problem Relation Age of Onset     Hyperlipidemia Father        Social History:  Marital Status:  Single [1]  Social History     Tobacco Use     Smoking status: Never Smoker     Smokeless tobacco: Never Used   Substance Use Topics     Alcohol use: No     Drug use: No     Comment: not anymore, marijuana use occas in past        Medications:    acetaminophen (TYLENOL) 500 MG tablet  fluticasone (VERAMYST) 27.5 MCG/SPRAY nasal spray  oxymetazoline (AFRIN NASAL SPRAY) 0.05 % nasal spray          Review of Systems   Constitutional: Negative for appetite change, chills, fatigue and fever.   HENT: Positive for congestion, rhinorrhea and sinus pressure. Negative for ear pain, facial swelling, hearing loss, postnasal drip, sore throat, trouble swallowing and voice change.    Respiratory: Negative for cough and shortness of breath.    Cardiovascular: Negative for chest pain.   Gastrointestinal: Negative for diarrhea, nausea and vomiting.   Genitourinary: Negative for dysuria.   Musculoskeletal: Negative for back pain, neck pain and neck stiffness.   Skin: Negative for rash and wound.   Neurological: Negative for weakness, light-headedness and headaches.   All other systems reviewed and are negative.      Physical Exam   BP: (!) 143/86  Pulse: 109  Temp: 98.7  F (37.1  C)  Resp: 18  Height: 160 cm (5' 3\")  Weight: 93 kg (205 lb)  SpO2: 98 %      Physical Exam  Vitals and nursing note reviewed.   Constitutional:       Appearance: Normal appearance. She is not ill-appearing or diaphoretic.   HENT:      Head: Atraumatic.      Right Ear: Tympanic membrane and ear canal normal.      Left Ear: Ear canal normal.      Nose: Mucosal edema, congestion and rhinorrhea present. No nasal tenderness.      Right Turbinates: Swollen.      Left Turbinates: Swollen.      Right Sinus: No maxillary sinus tenderness or frontal sinus tenderness.      Left Sinus: No maxillary sinus " tenderness or frontal sinus tenderness.      Mouth/Throat:      Mouth: Mucous membranes are moist.      Pharynx: No oropharyngeal exudate or posterior oropharyngeal erythema.   Eyes:      Conjunctiva/sclera: Conjunctivae normal.   Cardiovascular:      Rate and Rhythm: Normal rate.      Pulses: Normal pulses.   Pulmonary:      Effort: Pulmonary effort is normal.      Breath sounds: Normal breath sounds. No wheezing or rhonchi.   Abdominal:      Comments: Obese   Musculoskeletal:         General: Normal range of motion.      Cervical back: Normal range of motion.   Skin:     General: Skin is warm and dry.      Capillary Refill: Capillary refill takes less than 2 seconds.   Neurological:      Mental Status: She is alert and oriented to person, place, and time.   Psychiatric:         Mood and Affect: Mood normal.         ED Course        Procedures                No results found for this or any previous visit (from the past 24 hour(s)).    Medications   oxymetazoline (AFRIN) 0.05 % spray 2 spray (has no administration in time range)   fluticasone (FLONASE) 50 MCG/ACT spray 2 spray (has no administration in time range)       Assessments & Plan (with Medical Decision Making)  31-year-old female with obesity presenting for evaluation of sinus congestion and mild sinus discomfort with rhinorrhea.  Has had symptoms for about 4 days and has noticed decrease in taste and smell.  She was concerned about possible Covid infection although she is fully immunized.  Patient reports some mild pain but no tenderness on exam suggesting some mild sinusitis without a true sinus infection.  Will treat for Covid as this is a possible infection with this but likely this is just a simple nonspecific viral URI.  Recommended symptomatic treatment with oxymetazoline and fluticasone nasal sprays.  Discussed consideration for saline irrigation of her nose as well.     I have reviewed the nursing notes.    I have reviewed the findings, diagnosis,  plan and need for follow up with the patient.       New Prescriptions    ACETAMINOPHEN (TYLENOL) 500 MG TABLET    Take 2 tablets (1,000 mg) by mouth every 8 hours as needed for fever or pain    FLUTICASONE (VERAMYST) 27.5 MCG/SPRAY NASAL SPRAY    Spray 2 sprays into both nostrils daily as needed for rhinitis or allergies (nasal congestion)    OXYMETAZOLINE (AFRIN NASAL SPRAY) 0.05 % NASAL SPRAY    Spray 2 sprays in nostril 2 times daily for 3 days       Final diagnoses:   Viral URI   Nasal congestion - with mild sinusitis       9/30/2021   [facility] EMERGENCY DEPT     Mercer, Ramón Barclay MD  09/30/21 [id number]

## 2021-10-23 ENCOUNTER — HEALTH MAINTENANCE LETTER (OUTPATIENT)
Age: 31
End: 2021-10-23

## 2022-06-28 ENCOUNTER — HOSPITAL ENCOUNTER (EMERGENCY)
Facility: CLINIC | Age: 32
Discharge: HOME | End: 2022-06-28
Attending: PHYSICIAN ASSISTANT | Admitting: PHYSICIAN ASSISTANT
Payer: COMMERCIAL

## 2022-06-28 VITALS
RESPIRATION RATE: 18 BRPM | BODY MASS INDEX: 32.6 KG/M2 | DIASTOLIC BLOOD PRESSURE: 90 MMHG | HEART RATE: 70 BPM | HEIGHT: 63 IN | OXYGEN SATURATION: 99 % | TEMPERATURE: 96.4 F | WEIGHT: 184 LBS | SYSTOLIC BLOOD PRESSURE: 128 MMHG

## 2022-06-28 DIAGNOSIS — T63.481A INSECT STINGS, ACCIDENTAL OR UNINTENTIONAL, INITIAL ENCOUNTER: ICD-10-CM

## 2022-06-28 PROCEDURE — G0463 HOSPITAL OUTPT CLINIC VISIT: HCPCS | Performed by: PHYSICIAN ASSISTANT

## 2022-06-28 PROCEDURE — 99214 OFFICE O/P EST MOD 30 MIN: CPT | Performed by: PHYSICIAN ASSISTANT

## 2022-06-28 RX ORDER — CEPHALEXIN 500 MG/1
500 CAPSULE ORAL 4 TIMES DAILY
Qty: 40 CAPSULE | Refills: 0 | Status: SHIPPED | OUTPATIENT
Start: 2022-06-28 | End: 2022-07-08

## 2022-06-28 RX ORDER — DEXAMETHASONE 4 MG/1
10 TABLET ORAL ONCE
Qty: 3 TABLET | Refills: 0 | Status: SHIPPED | OUTPATIENT
Start: 2022-06-28 | End: 2022-09-14

## 2022-06-28 ASSESSMENT — ENCOUNTER SYMPTOMS
CONFUSION: 0
COLOR CHANGE: 0
ABDOMINAL PAIN: 0
SHORTNESS OF BREATH: 0
ARTHRALGIAS: 0
NECK STIFFNESS: 0
EYE REDNESS: 0
DIFFICULTY URINATING: 0
HEADACHES: 0
FEVER: 0

## 2022-06-29 ENCOUNTER — OFFICE VISIT (OUTPATIENT)
Dept: URGENT CARE | Facility: URGENT CARE | Age: 32
End: 2022-06-29
Payer: COMMERCIAL

## 2022-06-29 VITALS
BODY MASS INDEX: 32.63 KG/M2 | SYSTOLIC BLOOD PRESSURE: 124 MMHG | WEIGHT: 184.2 LBS | RESPIRATION RATE: 16 BRPM | HEART RATE: 96 BPM | DIASTOLIC BLOOD PRESSURE: 79 MMHG | OXYGEN SATURATION: 97 % | TEMPERATURE: 97.8 F

## 2022-06-29 DIAGNOSIS — T63.461D WASP STING, ACCIDENTAL OR UNINTENTIONAL, SUBSEQUENT ENCOUNTER: Primary | ICD-10-CM

## 2022-06-29 PROCEDURE — 99213 OFFICE O/P EST LOW 20 MIN: CPT

## 2022-06-29 NOTE — PROGRESS NOTES
SUBJECTIVE:  Patient presents with wasp sting yesterday and was treated in ED with decadron, claritin and given rx for antibiotic if increasing redness or streaking or fever.  Had some tingling in both forearms and hands which comes and goes.  Wondering about reaction to steroid..    Past Medical History:   Diagnosis Date     NO ACTIVE PROBLEMS      Current Outpatient Medications   Medication Sig Dispense Refill     fluticasone (VERAMYST) 27.5 MCG/SPRAY nasal spray Spray 2 sprays into both nostrils daily as needed for rhinitis or allergies (nasal congestion)  0     cephALEXin (KEFLEX) 500 MG capsule Take 1 capsule (500 mg) by mouth 4 times daily for 10 days (Patient not taking: Reported on 6/29/2022) 40 capsule 0     dexamethasone (DECADRON) 4 MG tablet Take 2.5 tablets (10 mg) by mouth once for 1 dose 3 tablet 0     History   Smoking Status     Never Smoker   Smokeless Tobacco     Never Used         OBJECITVE;  /79   Pulse 96   Temp 97.8  F (36.6  C) (Tympanic)   Resp 16   Wt 83.6 kg (184 lb 3.2 oz)   SpO2 97%   BMI 32.63 kg/m    Alert oriented and NAD  Lungs: clear  Skin:  Red slightly indurated patch on upper rt arm.  Not hot or tender. Second sting on lower arm less apparent.  No streaking. No neuro abnormalities  ASSESSMENT:  Wasp sting with local reaction  PLAN:  Symptomatic therapy suggested: increase fluids and continue claritin.  Take antibiotic only if fever, increasing redness or streaking..    Follow up with primary care provider if no improvement.

## 2022-06-29 NOTE — ED TRIAGE NOTES
Pt stung in right upper arm yesterday now red, raised and itchy softball size area     Triage Assessment     Row Name 06/28/22 1942       Skin Circulation/Temperature WDL    Skin Circulation/Temperature WDL X

## 2022-06-29 NOTE — DISCHARGE INSTRUCTIONS
Once daily Claritin or Zyrtec for the next 7 days.  You can get this over-the-counter.  Do not apply any topical Benadryl to the area can make you more irritated.    Cool compress to the area.  Tylenol and ibuprofen over-the-counter as needed for any local pain.    Single dose of oral Decadron given which is in your system for the next 3 days.  This is a steroid that can help decrease inflammation and swelling as well as local reaction.    At this time symptoms appear to be more of a local reaction to insect sting however if worsening redness, increased swelling, pain, or increased warmth occurs in the next 24 to 72 hours can fill the wait-and-see Keflex prescription and start this for treatment of cellulitis/skin infection.    Return the emergency department if fevers, chills, red streaking up the arm, change or worsening of symptoms occur.

## 2022-06-29 NOTE — ED PROVIDER NOTES
History     Chief Complaint   Patient presents with     Insect Bite     HPI  Gloria Rodríguez is a 31 year old female who presents today with insect sting with surrounding redness, swelling, itching and warmth to the right upper arm.  Patient states she was stung several times, but this 1 is the only one that has gotten increased red and swollen.  She denies significant pain, fevers chills or sweats, red streaking or abscess formation, but states that she is concerned because is gotten significantly more red larger in size and warm to the touch.  She has been using topical Benadryl cream with no improvement of symptoms.  This occurred yesterday while she was working.  Patient denies any shortness of breath, lip/tongue/facial swelling, pain, heart racing or skipping beats, abdominal pain, nausea or vomiting, dizziness.    Allergies:  Allergies   Allergen Reactions     Fruit Extracts      Bananas, itchy mouth     Ibuprofen Sodium Hives     Oseltamivir      Other reaction(s): Tremors     Sulfa Drugs Hives     Other [No Clinical Screening - See Comments] Rash     bleach     Silver Rash     Sodium Hypochlorite Other (See Comments) and Rash       Problem List:    Patient Active Problem List    Diagnosis Date Noted     Diet controlled gestational diabetes mellitus (GDM), antepartum 2018     Priority: Medium     S/P repeat low transverse  2018     Priority: Medium     Prenatal care, subsequent pregnancy 10/06/2017     Priority: Medium     Pilonidal cyst 2015     Priority: Medium     CARDIOVASCULAR SCREENING; LDL GOAL LESS THAN 130      Priority: Medium     NO ACTIVE PROBLEMS 11/15/2012     Priority: Medium        Past Medical History:    Past Medical History:   Diagnosis Date     NO ACTIVE PROBLEMS        Past Surgical History:    Past Surgical History:   Procedure Laterality Date     NO HISTORY OF SURGERY         Family History:    Family History   Problem Relation Age of Onset     Hyperlipidemia  "Father        Social History:  Marital Status:  Single [1]  Social History     Tobacco Use     Smoking status: Never Smoker     Smokeless tobacco: Never Used   Substance Use Topics     Alcohol use: No     Drug use: No     Comment: not anymore, marijuana use occas in past        Medications:    cephALEXin (KEFLEX) 500 MG capsule  dexamethasone (DECADRON) 4 MG tablet  fluticasone (VERAMYST) 27.5 MCG/SPRAY nasal spray          Review of Systems   Constitutional: Negative for fever.   HENT: Negative for congestion.    Eyes: Negative for redness.   Respiratory: Negative for shortness of breath.    Cardiovascular: Negative for chest pain.   Gastrointestinal: Negative for abdominal pain.   Genitourinary: Negative for difficulty urinating.   Musculoskeletal: Negative for arthralgias and neck stiffness.   Skin: Negative for color change.        Several stings to right upper extremity and thighs.  Redness, swelling and warmth to right upper arm   Neurological: Negative for headaches.   Psychiatric/Behavioral: Negative for confusion.   All other systems reviewed and are negative.      Physical Exam   BP: (!) 128/90  Pulse: 70  Temp: (!) 96.4  F (35.8  C)  Resp: 18  Height: 160 cm (5' 3\")  Weight: 83.5 kg (184 lb)  SpO2: 99 %      Physical Exam  Vitals and nursing note reviewed.   Constitutional:       Appearance: Normal appearance. She is normal weight.   HENT:      Head: Normocephalic.      Mouth/Throat:      Mouth: Mucous membranes are moist.      Pharynx: Oropharynx is clear. No oropharyngeal exudate or posterior oropharyngeal erythema.   Eyes:      General: No scleral icterus.     Extraocular Movements: Extraocular movements intact.      Conjunctiva/sclera: Conjunctivae normal.      Pupils: Pupils are equal, round, and reactive to light.   Cardiovascular:      Rate and Rhythm: Normal rate and regular rhythm.      Heart sounds: Normal heart sounds.   Pulmonary:      Effort: Pulmonary effort is normal.      Breath sounds: " Normal breath sounds.   Musculoskeletal:      Cervical back: Normal range of motion and neck supple.   Skin:     General: Skin is warm.      Capillary Refill: Capillary refill takes less than 2 seconds.      Findings: Erythema present.      Comments: Sting noted to right thigh and right forearm with no surrounding erythema, swelling or warmth.  Large area of swelling, warmth, and erythema to the right upper extremity with no tenderness or induration noted.  No red streaking.   Neurological:      General: No focal deficit present.      Mental Status: She is alert and oriented to person, place, and time.   Psychiatric:         Mood and Affect: Mood normal.         Behavior: Behavior normal.         Thought Content: Thought content normal.         Judgment: Judgment normal.      Comments: Patient slightly anxious         ED Course                 Procedures             Critical Care time:  none               No results found for this or any previous visit (from the past 24 hour(s)).    Medications - No data to display    Assessments & Plan (with Medical Decision Making)     I have reviewed the nursing notes.    I have reviewed the findings, diagnosis, plan and need for follow up with the patient.    Gloria Rodríguez is a 31 year old female who presents today with insect sting with surrounding redness, swelling, itching and warmth to the right upper arm.  Patient states she was stung several times, but this 1 is the only one that has gotten increased red and swollen.  She denies significant pain, fevers chills or sweats, red streaking or abscess formation, but states that she is concerned because is gotten significantly more red larger in size and warm to the touch.  She has been using topical Benadryl cream with no improvement of symptoms.  This occurred yesterday while she was working.  Patient denies any shortness of breath, lip/tongue/facial swelling, pain, heart racing or skipping beats, abdominal pain, nausea or  vomiting, dizziness.  Patient states she is concerned about infection and is very anxious.    See exam findings above.  Exam findings at this time suspected to be more of a local reaction but due to patient's concern wait-and-see prescription for Keflex sent home with patient to fill in a few days if increased redness, increased warmth, pain or red streaking occur.  Patient is in agreement this plan.  Patient to start Decadron as prescribed and over-the-counter Zyrtec or Claritin daily for the next 7 days.  Effects to Decadron discussed.  Patient's last tetanus was within the past 10 years.  Patient to do warm compress to the area.       Discharge Medication List as of 6/28/2022  7:58 PM      START taking these medications    Details   cephALEXin (KEFLEX) 500 MG capsule Take 1 capsule (500 mg) by mouth 4 times daily for 10 days, Disp-40 capsule, R-0, Local Print      dexamethasone (DECADRON) 4 MG tablet Take 2.5 tablets (10 mg) by mouth once for 1 dose, Disp-3 tablet, R-0, E-Prescribe             Final diagnoses:   Insect stings, accidental or unintentional, initial encounter       6/28/2022   [facility] EMERGENCY DEPT     Opal Lyles PA-C  06/28/22 2015

## 2022-07-15 ENCOUNTER — HOSPITAL ENCOUNTER (EMERGENCY)
Facility: CLINIC | Age: 32
Discharge: HOME | End: 2022-07-15
Attending: NURSE PRACTITIONER | Admitting: NURSE PRACTITIONER
Payer: COMMERCIAL

## 2022-07-15 VITALS
SYSTOLIC BLOOD PRESSURE: 128 MMHG | RESPIRATION RATE: 16 BRPM | TEMPERATURE: 98.7 F | WEIGHT: 184 LBS | HEIGHT: 63 IN | OXYGEN SATURATION: 100 % | DIASTOLIC BLOOD PRESSURE: 79 MMHG | HEART RATE: 90 BPM | BODY MASS INDEX: 32.6 KG/M2

## 2022-07-15 DIAGNOSIS — R09.82 POST-NASAL DRIP: ICD-10-CM

## 2022-07-15 DIAGNOSIS — J02.9 SORE THROAT: ICD-10-CM

## 2022-07-15 PROCEDURE — G0463 HOSPITAL OUTPT CLINIC VISIT: HCPCS | Performed by: NURSE PRACTITIONER

## 2022-07-15 PROCEDURE — 99212 OFFICE O/P EST SF 10 MIN: CPT | Performed by: NURSE PRACTITIONER

## 2022-07-15 ASSESSMENT — ENCOUNTER SYMPTOMS
LIGHT-HEADEDNESS: 0
NUMBNESS: 0
SINUS PAIN: 0
MYALGIAS: 0
FATIGUE: 0
EYE REDNESS: 0
EYE DISCHARGE: 0
RHINORRHEA: 0
CHILLS: 0
FEVER: 0
WEAKNESS: 0
ARTHRALGIAS: 0
COUGH: 0
ABDOMINAL PAIN: 0
JOINT SWELLING: 0
SORE THROAT: 1
HEADACHES: 0
SINUS PRESSURE: 0
VOMITING: 0
DIZZINESS: 0
TROUBLE SWALLOWING: 0
NAUSEA: 0
SHORTNESS OF BREATH: 0

## 2022-07-16 NOTE — ED PROVIDER NOTES
History     Chief Complaint   Patient presents with     Pharyngitis     HPI  Gloria Rodríguez is a 31 year old female who presents to the urgent care for evaluation of pharyngitis for 8 days. Denies fever, headache, dizziness, congestion, cough, shortness of breath, chest pain, abdominal pain, nausea, vomiting, diarrhea, dysuria, hematuria and rash. Negative home Covid 19 test. No difficulty swallowing or voice change. Symptoms developed shortly after stopping claritin.       Allergies:  Allergies   Allergen Reactions     Fruit Extracts      Bananas, itchy mouth     Ibuprofen Sodium Hives     Oseltamivir      Other reaction(s): Tremors     Sulfa Drugs Hives     Other [No Clinical Screening - See Comments] Rash     bleach     Silver Rash     Sodium Hypochlorite Other (See Comments) and Rash       Problem List:    Patient Active Problem List    Diagnosis Date Noted     Diet controlled gestational diabetes mellitus (GDM), antepartum 2018     Priority: Medium     S/P repeat low transverse  2018     Priority: Medium     Prenatal care, subsequent pregnancy 10/06/2017     Priority: Medium     Pilonidal cyst 2015     Priority: Medium     CARDIOVASCULAR SCREENING; LDL GOAL LESS THAN 130      Priority: Medium     NO ACTIVE PROBLEMS 11/15/2012     Priority: Medium        Past Medical History:    Past Medical History:   Diagnosis Date     NO ACTIVE PROBLEMS        Past Surgical History:    Past Surgical History:   Procedure Laterality Date     NO HISTORY OF SURGERY         Family History:    Family History   Problem Relation Age of Onset     Hyperlipidemia Father        Social History:  Marital Status:  Single [1]  Social History     Tobacco Use     Smoking status: Never Smoker     Smokeless tobacco: Never Used   Substance Use Topics     Alcohol use: No     Drug use: No     Comment: not anymore, marijuana use occas in past        Medications:    dexamethasone (DECADRON) 4 MG tablet  fluticasone  "(VERAMYST) 27.5 MCG/SPRAY nasal spray          Review of Systems   Constitutional: Negative for chills, fatigue and fever.   HENT: Positive for sore throat. Negative for congestion, ear discharge, ear pain, rhinorrhea, sinus pressure, sinus pain and trouble swallowing.    Eyes: Negative for discharge and redness.   Respiratory: Negative for cough and shortness of breath.    Cardiovascular: Negative for chest pain.   Gastrointestinal: Negative for abdominal pain, nausea and vomiting.   Musculoskeletal: Negative for arthralgias, joint swelling and myalgias.   Skin: Negative for rash.   Neurological: Negative for dizziness, weakness, light-headedness, numbness and headaches.   All other systems reviewed and are negative.      Physical Exam   BP: 128/79  Pulse: 90  Temp: 98.7  F (37.1  C)  Resp: 16  Height: 160 cm (5' 3\")  Weight: 83.5 kg (184 lb)  SpO2: 100 %      Physical Exam  Constitutional:       General: She is not in acute distress.     Appearance: She is well-developed. She is not diaphoretic.   HENT:      Mouth/Throat:      Pharynx: Uvula midline.      Tonsils: No tonsillar exudate or tonsillar abscesses. 1+ on the right. 1+ on the left.   Eyes:      Conjunctiva/sclera: Conjunctivae normal.      Pupils: Pupils are equal, round, and reactive to light.   Cardiovascular:      Rate and Rhythm: Normal rate and regular rhythm.      Pulses: Normal pulses.   Pulmonary:      Effort: Pulmonary effort is normal. No respiratory distress.      Breath sounds: Normal breath sounds and air entry. No decreased air movement. No decreased breath sounds, wheezing or rhonchi.   Abdominal:      General: There is no distension.      Palpations: Abdomen is soft.      Tenderness: There is no abdominal tenderness.   Musculoskeletal:         General: Normal range of motion.      Cervical back: Normal range of motion and neck supple.   Skin:     General: Skin is warm.      Capillary Refill: Capillary refill takes less than 2 seconds. "   Neurological:      General: No focal deficit present.      Mental Status: She is alert and oriented to person, place, and time.   Psychiatric:         Mood and Affect: Mood normal.         ED Course                 Procedures    No results found for this or any previous visit (from the past 24 hour(s)).    Medications - No data to display    Assessments & Plan (with Medical Decision Making)   Gloria Rodríguez is a 31 year old female who presents to the urgent care for evaluation of pharyngitis for 8 days. Vitals normal.  Patient unable to complete strep swab from nursing.  Low concern for strep throat.  Symptoms consistent with postnasal drip irritation.  Discussed home management. Return precautions reviewed, all questions answered. Patient is agreeable to plan of care and discharged in no acute distress.     I have reviewed the nursing notes.    I have reviewed the findings, diagnosis, plan and need for follow up with the patient.  Discharge Medication List as of 7/15/2022  7:54 PM          Final diagnoses:   Post-nasal drip   Sore throat       7/15/2022   [facility] EMERGENCY DEPT     Cayla Pulido, MACARENA CNP  07/15/22 2014

## 2022-07-16 NOTE — ED TRIAGE NOTES
Pt presents after finishing a course of claritin and abx for bug bite last Thursday and Saturday. Pt developed sore throat after stopping claritin and noticed white on her tongue. Pt only took a cold medicine today, but nothing for pain. Pt is able to eat and drink and complete ADL's.      Triage Assessment     Row Name 07/15/22 1935       Triage Assessment (Adult)    Airway WDL WDL       Respiratory WDL    Respiratory WDL WDL       Skin Circulation/Temperature WDL    Skin Circulation/Temperature WDL WDL       Cardiac WDL    Cardiac WDL WDL       Peripheral/Neurovascular WDL    Peripheral Neurovascular WDL WDL       Cognitive/Neuro/Behavioral WDL    Cognitive/Neuro/Behavioral WDL WDL

## 2022-09-11 ASSESSMENT — ENCOUNTER SYMPTOMS
CONSTIPATION: 0
PALPITATIONS: 0
ABDOMINAL PAIN: 0
DIZZINESS: 0
NERVOUS/ANXIOUS: 0
HEADACHES: 0
MYALGIAS: 0
SHORTNESS OF BREATH: 0
SORE THROAT: 0
FEVER: 0
NAUSEA: 0
EYE PAIN: 0
WEAKNESS: 0
ARTHRALGIAS: 0
CHILLS: 0
DYSURIA: 0
HEMATOCHEZIA: 0
JOINT SWELLING: 0
HEMATURIA: 0
COUGH: 0
HEARTBURN: 0
BREAST MASS: 0
FREQUENCY: 0
DIARRHEA: 0
PARESTHESIAS: 0

## 2022-09-14 ENCOUNTER — OFFICE VISIT (OUTPATIENT)
Dept: FAMILY MEDICINE | Facility: CLINIC | Age: 32
End: 2022-09-14
Payer: COMMERCIAL

## 2022-09-14 VITALS
BODY MASS INDEX: 34.2 KG/M2 | HEART RATE: 64 BPM | RESPIRATION RATE: 16 BRPM | TEMPERATURE: 98.1 F | SYSTOLIC BLOOD PRESSURE: 126 MMHG | HEIGHT: 63 IN | WEIGHT: 193 LBS | DIASTOLIC BLOOD PRESSURE: 84 MMHG

## 2022-09-14 DIAGNOSIS — R79.89 LOW TSH LEVEL: ICD-10-CM

## 2022-09-14 DIAGNOSIS — Z13.6 CARDIOVASCULAR SCREENING; LDL GOAL LESS THAN 130: ICD-10-CM

## 2022-09-14 DIAGNOSIS — Z00.00 ROUTINE GENERAL MEDICAL EXAMINATION AT A HEALTH CARE FACILITY: Primary | ICD-10-CM

## 2022-09-14 LAB
CHOLEST SERPL-MCNC: 209 MG/DL
FASTING STATUS PATIENT QL REPORTED: NO
GLUCOSE SERPL-MCNC: 94 MG/DL (ref 70–99)
HDLC SERPL-MCNC: 53 MG/DL
LDLC SERPL CALC-MCNC: 130 MG/DL
NONHDLC SERPL-MCNC: 156 MG/DL
T4 FREE SERPL-MCNC: 0.91 NG/DL (ref 0.9–1.7)
TRIGL SERPL-MCNC: 132 MG/DL
TSH SERPL DL<=0.005 MIU/L-ACNC: 0.1 UIU/ML (ref 0.3–4.2)

## 2022-09-14 PROCEDURE — 36415 COLL VENOUS BLD VENIPUNCTURE: CPT | Performed by: NURSE PRACTITIONER

## 2022-09-14 PROCEDURE — 99395 PREV VISIT EST AGE 18-39: CPT | Performed by: NURSE PRACTITIONER

## 2022-09-14 PROCEDURE — 80061 LIPID PANEL: CPT | Performed by: NURSE PRACTITIONER

## 2022-09-14 PROCEDURE — 84439 ASSAY OF FREE THYROXINE: CPT | Performed by: NURSE PRACTITIONER

## 2022-09-14 PROCEDURE — 82947 ASSAY GLUCOSE BLOOD QUANT: CPT | Performed by: NURSE PRACTITIONER

## 2022-09-14 PROCEDURE — 84443 ASSAY THYROID STIM HORMONE: CPT | Performed by: NURSE PRACTITIONER

## 2022-09-14 RX ORDER — BIOTIN 10 MG
TABLET ORAL
COMMUNITY
End: 2023-05-22

## 2022-09-14 RX ORDER — LORATADINE 10 MG/1
10 TABLET ORAL DAILY
COMMUNITY

## 2022-09-14 ASSESSMENT — ENCOUNTER SYMPTOMS
HEARTBURN: 0
HEADACHES: 0
DYSURIA: 0
PALPITATIONS: 0
EYE PAIN: 0
CONSTIPATION: 0
SORE THROAT: 0
PARESTHESIAS: 0
NERVOUS/ANXIOUS: 0
JOINT SWELLING: 0
ABDOMINAL PAIN: 0
HEMATURIA: 0
HEMATOCHEZIA: 0
FEVER: 0
DIZZINESS: 0
DIARRHEA: 0
COUGH: 0
WEAKNESS: 0
SHORTNESS OF BREATH: 0
CHILLS: 0
MYALGIAS: 0
BREAST MASS: 0
ARTHRALGIAS: 0
FREQUENCY: 0
NAUSEA: 0
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
## 2022-10-10 ENCOUNTER — HEALTH MAINTENANCE LETTER (OUTPATIENT)
Age: 32
End: 2022-10-10

## 2022-11-29 ENCOUNTER — IMMUNIZATION (OUTPATIENT)
Dept: FAMILY MEDICINE | Facility: CLINIC | Age: 32
End: 2022-11-29
Payer: COMMERCIAL

## 2022-11-29 DIAGNOSIS — Z23 HIGH PRIORITY FOR 2019-NCOV VACCINE: Primary | ICD-10-CM

## 2022-11-29 PROCEDURE — 0124A COVID-19 VACCINE BIVALENT BOOSTER 12+ (PFIZER): CPT

## 2022-11-29 PROCEDURE — 99207 PR NO CHARGE NURSE ONLY: CPT

## 2022-11-29 PROCEDURE — 91312 COVID-19 VACCINE BIVALENT BOOSTER 12+ (PFIZER): CPT

## 2023-01-17 ENCOUNTER — OFFICE VISIT (OUTPATIENT)
Dept: FAMILY MEDICINE | Facility: CLINIC | Age: 33
End: 2023-01-17
Payer: COMMERCIAL

## 2023-01-17 VITALS
TEMPERATURE: 97.7 F | WEIGHT: 184 LBS | RESPIRATION RATE: 18 BRPM | BODY MASS INDEX: 31.41 KG/M2 | HEIGHT: 64 IN | OXYGEN SATURATION: 97 % | SYSTOLIC BLOOD PRESSURE: 110 MMHG | HEART RATE: 82 BPM | DIASTOLIC BLOOD PRESSURE: 60 MMHG

## 2023-01-17 DIAGNOSIS — B35.4 TINEA CORPORIS: Primary | ICD-10-CM

## 2023-01-17 PROCEDURE — 99213 OFFICE O/P EST LOW 20 MIN: CPT | Performed by: NURSE PRACTITIONER

## 2023-01-17 RX ORDER — KETOCONAZOLE 20 MG/G
CREAM TOPICAL DAILY
Qty: 30 G | Refills: 0 | Status: SHIPPED | OUTPATIENT
Start: 2023-01-17 | End: 2023-07-14

## 2023-01-17 ASSESSMENT — PAIN SCALES - GENERAL: PAINLEVEL: NO PAIN (0)

## 2023-01-17 NOTE — PROGRESS NOTES
"  Assessment & Plan     (B35.4) Tinea corporis  (primary encounter diagnosis)  Comment:   Plan: ketoconazole (NIZORAL) 2 % external cream         See Patient Instructions    No follow-ups on file.    MACARENA Feliciano CNP  M [facility]    Yair Molina is a 32 year old, presenting for the following health issues:  No chief complaint on file.      History of Present Illness       Reason for visit:  Rash/ringworm on leg  Symptom onset:  More than a month  Symptoms include:  Itchy red patch  Symptom intensity:  Mild  Symptom progression:  Improving  Had these symptoms before:  No    She eats 0-1 servings of fruits and vegetables daily.She consumes 2 sweetened beverage(s) daily.She exercises with enough effort to increase her heart rate 20 to 29 minutes per day.  She exercises with enough effort to increase her heart rate 3 or less days per week.   She is taking medications regularly.       Rash  Onset/Duration: a few months  Description  Location: upper right thigh  Character: red, raised, flaky  Itching: mild  Intensity:  moderate  Progression of Symptoms:  waxing and waning  Accompanying signs and symptoms:   Fever: No  Body aches or joint pain: No  Sore throat symptoms: No  Recent cold symptoms: No  History:           Previous episodes of similar rash: None  New exposures:  None  Recent travel: No  Exposure to similar rash: No  Precipitating or alleviating factors: nothing  Therapies tried and outcome: ringworm cream          Review of Systems   Constitutional, HEENT, cardiovascular, pulmonary, gi and gu systems are negative, except as otherwise noted.      Objective    /60 (BP Location: Right arm, Cuff Size: Adult Large)   Pulse 82   Temp 97.7  F (36.5  C) (Tympanic)   Resp 18   Ht 1.613 m (5' 3.5\")   Wt 83.5 kg (184 lb)   SpO2 97%   BMI 32.08 kg/m    There is no height or weight on file to calculate BMI.  Physical Exam   GENERAL: healthy, alert and no distress  EYES: " Eyes grossly normal to inspection, PERRL and conjunctivae and sclerae normal  HENT: ear canals and TM's normal, nose and mouth without ulcers or lesions  NECK: no adenopathy, no asymmetry, masses, or scars and thyroid normal to palpation  RESP: lungs clear to auscultation - no rales, rhonchi or wheezes  CV: regular rate and rhythm, normal S1 S2, no S3 or S4, no murmur, click or rub, no peripheral edema and peripheral pulses strong  MS: no gross musculoskeletal defects noted, no edema  SKIN: no suspicious lesions or rashes  NEURO: Normal strength and tone, mentation intact and speech normal  PSYCH: mentation appears normal, affect normal/bright

## 2023-05-22 ENCOUNTER — VIRTUAL VISIT (OUTPATIENT)
Dept: OBGYN | Facility: CLINIC | Age: 33
End: 2023-05-22
Payer: COMMERCIAL

## 2023-05-22 DIAGNOSIS — Z34.80 PRENATAL CARE, SUBSEQUENT PREGNANCY: ICD-10-CM

## 2023-05-22 PROCEDURE — 99207 PR NO CHARGE NURSE ONLY: CPT

## 2023-05-22 RX ORDER — PRENATAL VIT/IRON FUM/FOLIC AC 27MG-0.8MG
1 TABLET ORAL DAILY
COMMUNITY
Start: 2023-05-08 | End: 2024-03-11
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
## 2023-05-31 ENCOUNTER — TELEPHONE (OUTPATIENT)
Dept: OBGYN | Facility: CLINIC | Age: 33
End: 2023-05-31
Payer: COMMERCIAL

## 2023-05-31 NOTE — TELEPHONE ENCOUNTER
Reason for call:  Patient reporting a symptom    Symptom or request: LMP 4/7/23, 1st ob appt in 2 weeks.  Having pelvic pain when she lifts something at work - sharp pain near pelvic region.    Duration (how long have symptoms been present): since Saturday and yesterday also - both times when she was at work.    Have you been treated for this before? No    Additional comments:     Phone Number patient can be reached at:  Home number on file [phone] (home)    Best Time:      Can we leave a detailed message on this number:  YES    Call taken on 5/31/2023 at 9:33 AM by Shreya Cramer

## 2023-05-31 NOTE — TELEPHONE ENCOUNTER
"S-(situation): pain with heavy lifting     B-(background):  Estimated Date of Delivery: 2024  1st OB office visit 2023  Has completed education with Brianna Sanchez    A-(assessment): \" a little bit of pelvic pain when lifting at work.\"  Patient reports lifting a grill on Saturday \"about 50 pounds\"  Patient felt pain 1 cm from umbilicus going downward.  Lasted about 5-10 seconds   Mainly on the right side.  Resolved quickly.    Patient felt pain with lifting yesterday.  First time since last occurrence on Saturday  Yesterday patient picked up and was carrying a magazine rack  Patient thinks it     Sometimes feels     No bright red bleeding   No abnormal discharge     Patient reports some dizziness yesterday while working outside.  Patient went home and drank fluids and felt better within the hour.      R-(recommendations): Reassurance provided. Discussed good body mechanics and lifting techniques. Discussed lifting what she feels capable of and asking for help when needed. Reviewed extra hydration when working outside, body needs more fluids for replacement as she sweats out extra fluids. Water and gatorade good options. Reassurance she is not experiencing any bright red bleeding or cramping pain. Continue to monitor. Discuss at first OB appointment what is appropriate for guidelines for lifting and working outside.     Pt in agreement and reports understanding.    Bonny MEANS   [facility]       "

## 2023-06-12 ENCOUNTER — APPOINTMENT (OUTPATIENT)
Dept: ULTRASOUND IMAGING | Facility: CLINIC | Age: 33
End: 2023-06-12
Attending: EMERGENCY MEDICINE
Payer: COMMERCIAL

## 2023-06-12 ENCOUNTER — HOSPITAL ENCOUNTER (EMERGENCY)
Facility: CLINIC | Age: 33
Discharge: HOME | End: 2023-06-12
Attending: EMERGENCY MEDICINE | Admitting: EMERGENCY MEDICINE
Payer: COMMERCIAL

## 2023-06-12 VITALS
HEIGHT: 63 IN | SYSTOLIC BLOOD PRESSURE: 124 MMHG | DIASTOLIC BLOOD PRESSURE: 71 MMHG | TEMPERATURE: 97.7 F | HEART RATE: 75 BPM | RESPIRATION RATE: 20 BRPM | OXYGEN SATURATION: 99 % | WEIGHT: 203 LBS | BODY MASS INDEX: 35.97 KG/M2

## 2023-06-12 DIAGNOSIS — R10.84 ABDOMINAL PAIN, GENERALIZED: ICD-10-CM

## 2023-06-12 DIAGNOSIS — Z34.81 PRENATAL CARE, SUBSEQUENT PREGNANCY IN FIRST TRIMESTER: ICD-10-CM

## 2023-06-12 LAB
ALBUMIN SERPL BCG-MCNC: 4.2 G/DL (ref 3.5–5.2)
ALBUMIN UR-MCNC: NEGATIVE MG/DL
ALP SERPL-CCNC: 67 U/L (ref 35–104)
ALT SERPL W P-5'-P-CCNC: 9 U/L (ref 10–35)
ANION GAP SERPL CALCULATED.3IONS-SCNC: 13 MMOL/L (ref 7–15)
APPEARANCE UR: ABNORMAL
AST SERPL W P-5'-P-CCNC: 14 U/L (ref 10–35)
BACTERIA #/AREA URNS HPF: ABNORMAL /HPF
BASOPHILS # BLD AUTO: 0.1 10E3/UL (ref 0–0.2)
BASOPHILS NFR BLD AUTO: 0 %
BILIRUB SERPL-MCNC: 0.3 MG/DL
BILIRUB UR QL STRIP: NEGATIVE
BUN SERPL-MCNC: 4.7 MG/DL (ref 6–20)
CALCIUM SERPL-MCNC: 9.4 MG/DL (ref 8.6–10)
CHLORIDE SERPL-SCNC: 102 MMOL/L (ref 98–107)
COLOR UR AUTO: YELLOW
CREAT SERPL-MCNC: 0.54 MG/DL (ref 0.51–0.95)
DEPRECATED HCO3 PLAS-SCNC: 24 MMOL/L (ref 22–29)
EOSINOPHIL # BLD AUTO: 0.1 10E3/UL (ref 0–0.7)
EOSINOPHIL NFR BLD AUTO: 1 %
ERYTHROCYTE [DISTWIDTH] IN BLOOD BY AUTOMATED COUNT: 12.4 % (ref 10–15)
GFR SERPL CREATININE-BSD FRML MDRD: >90 ML/MIN/1.73M2
GLUCOSE SERPL-MCNC: 88 MG/DL (ref 70–99)
GLUCOSE UR STRIP-MCNC: NEGATIVE MG/DL
HCG SERPL QL: POSITIVE
HCT VFR BLD AUTO: 43.8 % (ref 35–47)
HGB BLD-MCNC: 14.9 G/DL (ref 11.7–15.7)
HGB UR QL STRIP: NEGATIVE
HOLD SPECIMEN: NORMAL
IMM GRANULOCYTES # BLD: 0.1 10E3/UL
IMM GRANULOCYTES NFR BLD: 0 %
KETONES UR STRIP-MCNC: NEGATIVE MG/DL
LEUKOCYTE ESTERASE UR QL STRIP: NEGATIVE
LIPASE SERPL-CCNC: 27 U/L (ref 13–60)
LYMPHOCYTES # BLD AUTO: 3.1 10E3/UL (ref 0.8–5.3)
LYMPHOCYTES NFR BLD AUTO: 20 %
MCH RBC QN AUTO: 30.6 PG (ref 26.5–33)
MCHC RBC AUTO-ENTMCNC: 34 G/DL (ref 31.5–36.5)
MCV RBC AUTO: 90 FL (ref 78–100)
MONOCYTES # BLD AUTO: 0.8 10E3/UL (ref 0–1.3)
MONOCYTES NFR BLD AUTO: 5 %
MUCOUS THREADS #/AREA URNS LPF: PRESENT /LPF
NEUTROPHILS # BLD AUTO: 11.5 10E3/UL (ref 1.6–8.3)
NEUTROPHILS NFR BLD AUTO: 74 %
NITRATE UR QL: NEGATIVE
NRBC # BLD AUTO: 0 10E3/UL
NRBC BLD AUTO-RTO: 0 /100
PH UR STRIP: 6 [PH] (ref 5–7)
PLATELET # BLD AUTO: 439 10E3/UL (ref 150–450)
POTASSIUM SERPL-SCNC: 3.6 MMOL/L (ref 3.4–5.3)
PROT SERPL-MCNC: 7.8 G/DL (ref 6.4–8.3)
RBC # BLD AUTO: 4.87 10E6/UL (ref 3.8–5.2)
RBC URINE: 1 /HPF
SODIUM SERPL-SCNC: 139 MMOL/L (ref 136–145)
SP GR UR STRIP: 1.01 (ref 1–1.03)
SQUAMOUS EPITHELIAL: 7 /HPF
UROBILINOGEN UR STRIP-MCNC: NORMAL MG/DL
WBC # BLD AUTO: 15.7 10E3/UL (ref 4–11)
WBC URINE: 3 /HPF

## 2023-06-12 PROCEDURE — 83690 ASSAY OF LIPASE: CPT | Performed by: EMERGENCY MEDICINE

## 2023-06-12 PROCEDURE — 85025 COMPLETE CBC W/AUTO DIFF WBC: CPT | Performed by: EMERGENCY MEDICINE

## 2023-06-12 PROCEDURE — 99283 EMERGENCY DEPT VISIT LOW MDM: CPT | Performed by: EMERGENCY MEDICINE

## 2023-06-12 PROCEDURE — 76700 US EXAM ABDOM COMPLETE: CPT

## 2023-06-12 PROCEDURE — 80053 COMPREHEN METABOLIC PANEL: CPT | Performed by: EMERGENCY MEDICINE

## 2023-06-12 PROCEDURE — 84703 CHORIONIC GONADOTROPIN ASSAY: CPT | Performed by: EMERGENCY MEDICINE

## 2023-06-12 PROCEDURE — 36415 COLL VENOUS BLD VENIPUNCTURE: CPT | Performed by: EMERGENCY MEDICINE

## 2023-06-12 PROCEDURE — 99284 EMERGENCY DEPT VISIT MOD MDM: CPT | Mod: 25 | Performed by: EMERGENCY MEDICINE

## 2023-06-12 PROCEDURE — 81001 URINALYSIS AUTO W/SCOPE: CPT | Performed by: EMERGENCY MEDICINE

## 2023-06-12 PROCEDURE — 83036 HEMOGLOBIN GLYCOSYLATED A1C: CPT | Performed by: OBSTETRICS & GYNECOLOGY

## 2023-06-12 ASSESSMENT — ACTIVITIES OF DAILY LIVING (ADL): ADLS_ACUITY_SCORE: 35

## 2023-06-12 NOTE — Clinical Note
Gloria Rodríguez was seen and treated in our emergency department on 6/12/2023.  She may return to work on 06/13/2023.  No lifting greater than 20 pounds.Until cleared by OB/GYN     If you have any questions or concerns, please don't hesitate to call.      Dov Lorenz MD

## 2023-06-12 NOTE — DISCHARGE INSTRUCTIONS
Return if symptoms worsen or new symptoms develop.Follow-up with primary care physician next available.  Drink plenty of fluids.  There was a probable simple cyst on your left kidney that needs follow-up after pregnancy.  No other acute abnormality noted.  No heavy lifting until cleared by OB.  If increased pain any vaginal discharge bleeding or other symptoms present please return for recheck.

## 2023-06-12 NOTE — ED TRIAGE NOTES
Patient is  and thinks maybe she pulled a muscle   Triage Assessment     Row Name 06/12/23 2686       Triage Assessment (Adult)    Airway WDL WDL       Respiratory WDL    Respiratory WDL WDL       Skin Circulation/Temperature WDL    Skin Circulation/Temperature WDL WDL       Cardiac WDL    Cardiac WDL WDL       Peripheral/Neurovascular WDL    Peripheral Neurovascular WDL WDL       Cognitive/Neuro/Behavioral WDL    Cognitive/Neuro/Behavioral WDL WDL

## 2023-06-12 NOTE — ED PROVIDER NOTES
History     Chief Complaint   Patient presents with     Abdominal Pain     Pain on bilateral abdominal walls maybe lifted something heavy at work pain since 1015 this morning     HPI  Gloria Rodríguez is a 32 year old female with past medical history significant for PTSD depression and anxiety and gestational diabetes who presents emergency department complaining abdominal pain.  Patient reports she is 6 weeks pregnant and is working as a  and states she lifted something heavy yesterday and has had significant upper abdominal pain radiating to her lower abdomen.  Patient states the pain is an aching pain that is somewhat improved from yesterday but still present hurts with movement.  She is not having any vaginal bleeding abdominal cramping or blood vaginal discharge.  She denies any chest pain or shortness of breath.  She has not had any urinary symptoms denies any bowel or bladder dysfunction denies any swelling in her legs denies calf pain or focal numbness weakness any extremity she has not had a    Allergies:  Allergies   Allergen Reactions     Banana Hives     Fruit Extracts      Bananas, itchy mouth     Ibuprofen Sodium Hives     Oseltamivir      Other reaction(s): Tremors     Sulfa Antibiotics Hives     Other [No Clinical Screening - See Comments] Rash     bleach     Silver Rash     Sodium Hypochlorite Other (See Comments) and Rash       Problem List:    Patient Active Problem List    Diagnosis Date Noted     Diet controlled gestational diabetes mellitus (GDM), antepartum 2018     Priority: Medium     S/P repeat low transverse  2018     Priority: Medium     Prenatal care, subsequent pregnancy 10/06/2017     Priority: Medium     FOB- Abdulaziz Kothari       Pilonidal cyst 2015     Priority: Medium     CARDIOVASCULAR SCREENING; LDL GOAL LESS THAN 130      Priority: Medium     NO ACTIVE PROBLEMS 11/15/2012     Priority: Medium        Past Medical History:    Past Medical  "History:   Diagnosis Date     Anxiety      Depression      Gestational diabetes mellitus 2018     NO ACTIVE PROBLEMS      Postpartum depression 2016     PTSD (post-traumatic stress disorder)        Past Surgical History:    Past Surgical History:   Procedure Laterality Date      SECTION      ,2018     PILONIDAL CYST / SINUS EXCISION         Family History:    Family History   Problem Relation Age of Onset     Depression Mother      Substance Abuse Mother         Alcoholic     Kidney Disease Mother      Bipolar Disorder Mother      Ulcers Mother         stomach     Hyperlipidemia Father      Hypertension Paternal Grandmother      Thrombosis Paternal Grandfather      Thyroid Disease Paternal Grandfather        Social History:  Marital Status:  Single [1]  Social History     Tobacco Use     Smoking status: Never     Smokeless tobacco: Never   Vaping Use     Vaping status: Never Used   Substance Use Topics     Alcohol use: No     Drug use: Yes     Comment: medical marijuana- quit with pregnancy        Medications:    Fluticasone Propionate (FLONASE NA)  ketoconazole (NIZORAL) 2 % external cream  loratadine (CLARITIN) 10 MG tablet  Prenatal Vit-Fe Fumarate-FA (PRENATAL MULTIVITAMIN W/IRON) 27-0.8 MG tablet          Review of Systems  As per HPI.  Physical Exam   BP: 116/77  Pulse: 78  Temp: 97.7  F (36.5  C)  Resp: 20  Height: 160 cm (5' 3\")  Weight: 92.1 kg (203 lb)  SpO2: 99 %      Physical Exam  Vitals and nursing note reviewed.   Constitutional:       General: She is not in acute distress.     Appearance: She is well-developed. She is not ill-appearing, toxic-appearing or diaphoretic.   HENT:      Head: Normocephalic and atraumatic.      Nose: Nose normal.   Eyes:      Conjunctiva/sclera: Conjunctivae normal.   Cardiovascular:      Rate and Rhythm: Normal rate and regular rhythm.      Pulses: Normal pulses.      Heart sounds: Normal heart sounds.   Pulmonary:      Effort: Pulmonary effort is normal.      " Breath sounds: Normal breath sounds.   Abdominal:      General: Abdomen is flat. Bowel sounds are normal. There is no distension.      Palpations: Abdomen is soft.      Tenderness: There is no right CVA tenderness.      Comments: Mild tenderness palpation of the upper abdomen left greater than right there is no lower abdominal tenderness present.  There is no guarding or rebound.  Bowel sounds positive no masses or hernia are palpated there is no flank pain present.   Musculoskeletal:         General: No swelling or tenderness. Normal range of motion.      Cervical back: Normal range of motion and neck supple.      Right lower leg: No edema.      Left lower leg: No edema.   Skin:     General: Skin is warm and dry.      Capillary Refill: Capillary refill takes less than 2 seconds.      Coloration: Skin is not pale.      Findings: No bruising or erythema.   Neurological:      General: No focal deficit present.      Mental Status: She is alert and oriented to person, place, and time.      Sensory: No sensory deficit.      Motor: No weakness.      Coordination: Coordination normal.   Psychiatric:         Mood and Affect: Mood normal.         ED Course                 Procedures              Critical Care time:  none               Results for orders placed or performed during the hospital encounter of 06/12/23 (from the past 24 hour(s))   [place] Draw    Narrative    The following orders were created for panel order [place] Draw.  Procedure                               Abnormality         Status                     ---------                               -----------         ------                     Extra Blue Top Tube[[id number]]                              In process                 Extra Red Top Tube[[id number]]                               In process                 Extra Green Top (Lithium...[[id number]]                      In process                 Extra Purple Top Tube[[id number]]                            In  process                   Please view results for these tests on the individual orders.   CBC with platelets, differential    Narrative    The following orders were created for panel order CBC with platelets, differential.  Procedure                               Abnormality         Status                     ---------                               -----------         ------                     CBC with platelets and d...[[id number]]  Abnormal            Final result                 Please view results for these tests on the individual orders.   Comprehensive metabolic panel   Result Value Ref Range    Sodium 139 136 - 145 mmol/L    Potassium 3.6 3.4 - 5.3 mmol/L    Chloride 102 98 - 107 mmol/L    Carbon Dioxide (CO2) 24 22 - 29 mmol/L    Anion Gap 13 7 - 15 mmol/L    Urea Nitrogen 4.7 (L) 6.0 - 20.0 mg/dL    Creatinine 0.54 0.51 - 0.95 mg/dL    Calcium 9.4 8.6 - 10.0 mg/dL    Glucose 88 70 - 99 mg/dL    Alkaline Phosphatase 67 35 - 104 U/L    AST 14 10 - 35 U/L    ALT 9 (L) 10 - 35 U/L    Protein Total 7.8 6.4 - 8.3 g/dL    Albumin 4.2 3.5 - 5.2 g/dL    Bilirubin Total 0.3 <=1.2 mg/dL    GFR Estimate >90 >60 mL/min/1.73m2   Lipase   Result Value Ref Range    Lipase 27 13 - 60 U/L   CBC with platelets and differential   Result Value Ref Range    WBC Count 15.7 (H) 4.0 - 11.0 10e3/uL    RBC Count 4.87 3.80 - 5.20 10e6/uL    Hemoglobin 14.9 11.7 - 15.7 g/dL    Hematocrit 43.8 35.0 - 47.0 %    MCV 90 78 - 100 fL    MCH 30.6 26.5 - 33.0 pg    MCHC 34.0 31.5 - 36.5 g/dL    RDW 12.4 10.0 - 15.0 %    Platelet Count 439 150 - 450 10e3/uL    % Neutrophils 74 %    % Lymphocytes 20 %    % Monocytes 5 %    % Eosinophils 1 %    % Basophils 0 %    % Immature Granulocytes 0 %    NRBCs per 100 WBC 0 <1 /100    Absolute Neutrophils 11.5 (H) 1.6 - 8.3 10e3/uL    Absolute Lymphocytes 3.1 0.8 - 5.3 10e3/uL    Absolute Monocytes 0.8 0.0 - 1.3 10e3/uL    Absolute Eosinophils 0.1 0.0 - 0.7 10e3/uL    Absolute Basophils 0.1 0.0 - 0.2  10e3/uL    Absolute Immature Granulocytes 0.1 <=0.4 10e3/uL    Absolute NRBCs 0.0 10e3/uL   UA with Microscopic reflex to Culture    Specimen: Urine, Clean Catch   Result Value Ref Range    Color Urine Yellow Colorless, Straw, Light Yellow, Yellow    Appearance Urine Slightly Cloudy (A) Clear    Glucose Urine Negative Negative mg/dL    Bilirubin Urine Negative Negative    Ketones Urine Negative Negative mg/dL    Specific Gravity Urine 1.012 1.003 - 1.035    Blood Urine Negative Negative    pH Urine 6.0 5.0 - 7.0    Protein Albumin Urine Negative Negative mg/dL    Urobilinogen Urine Normal Normal, 2.0 mg/dL    Nitrite Urine Negative Negative    Leukocyte Esterase Urine Negative Negative    Bacteria Urine Few (A) None Seen /HPF    Mucus Urine Present (A) None Seen /LPF    RBC Urine 1 <=2 /HPF    WBC Urine 3 <=5 /HPF    Squamous Epithelials Urine 7 (H) <=1 /HPF    Narrative    Urine Culture not indicated       Medications - No data to display    Assessments & Plan (with Medical Decision Making) past medical history medications and allergies were reviewed.  Virtual visit from 5/22/2023 was reviewed.  Labs were obtained.  Due to patient pregnancy a abdominal ultrasound was obtained.  I independently reviewed and interpreted the labs.  White count was 15.7 hemoglobin 14.9 platelet count 439.  Comprehensive metabolic panel without significant abnormality.  Lipase was 27.  Pregnancy test positive.  UA without significant abnormality.  Abdominal ultrasound complete revealed no acute sonographic abnormality of the abdomen indeterminate hypoechoic lesion in the superior mid zone of left kidney measuring 1.6 cm likely a simple cyst which should be followed up with repeat ultrasound after pregnancy.  Patient is informed of this and feels comfortable with this plan I do not think an ultrasound is warranted of OB as she is not having any vaginal discharge cramping or lower abdominal pain patient feels comfortable with this plan.   We will have her follow-up with her primary care this week for recheck and we will limit her lifting over the next few days.  She should return if any worsening pain decreased urine output vaginal bleeding lower abdominal abdominal pain or other symptoms present.  Patient is agreement this plan.     I have reviewed the nursing notes.    I have reviewed the findings, diagnosis, plan and need for follow up with the patient.             New Prescriptions    No medications on file       Final diagnoses:   Abdominal pain, generalized       6/12/2023   [facility] EMERGENCY DEPT     Dov Lorenz MD  06/14/23 1909

## 2023-06-14 NOTE — PATIENT INSTRUCTIONS
"    Pregnancy: Your First Trimester Changes  The first trimester is a time of rapid development for your baby. Because your baby is growing so quickly, it is important that you start a healthy lifestyle right away. By the end of the first trimester, your baby has formed all of its major body organs and weighs just over an ounce.  Month 1 (weeks 1 to 4)  The placenta (the organ that nourishes your baby) begins to form. The brain, spinal cord, heart, gastrointestinal tract, and lungs begin to develop. Your baby is about   inch long by the end of the first month.     Actual size of baby is 1/4\".     Month 2 (weeks 5 to 8)  All of your baby s major body organs form. The face, fingers, toes, ears, and eyes appear. By the end of the month, your baby is about 1 inch long.     Actual size of baby is 1\".     Month 3 (weeks 9 to 12)  Your baby can open and close its fists and mouth. The sexual organs begin to form. As the first trimester ends, your baby is about 3 inches long.     Actual size of baby is 3\".     [employer] last reviewed this educational content on 8/1/2020 2000-2022 [employer]. All rights reserved. This information is not intended as a substitute for professional medical care. Always follow your healthcare professional's instructions.          Vitamin B6 (pyridoxine) Oral Tablet  Uses  This medicine is used for the following purposes:    metabolism disorder    nausea and vomiting    vitamin deficiency  Instructions  This medicine may be taken with or without food.  Store at room temperature away from heat, light, and moisture. Do not keep in the bathroom.  If you forget to take a dose on time, take it as soon as you remember. If it is almost time for the next dose, do not take the missed dose. Return to your normal dosing schedule. Do not take 2 doses of this medicine at one time.  Drug interactions can change how medicines work or increase risk for side effects. Tell your health care providers " about all medicines taken. Include prescription and over-the-counter medicines, vitamins, and herbal medicines. Speak with your doctor or pharmacist before starting or stopping any medicine.  Speak with your doctor or pharmacist before starting any other vitamins.  It is very important that you follow your doctor's instructions for all blood tests.  Cautions  Tell your doctor and pharmacist if you ever had an allergic reaction to a medicine.  Do not use the medication any more than instructed.  Tell the doctor or pharmacist if you are pregnant, planning to be pregnant, or breastfeeding.  Side Effects  If you have any of the following side effects, you may be getting too much medicine. Please contact your doctor to let them know about these side effects.    drowsiness or sedation    numbness or tingling in hands and feet    headaches    nausea    stomach upset or abdominal pain  This medicine usually has no side effects.  A few people may have an allergic reaction to this medicine. Symptoms can include difficulty breathing, skin rash, itching, swelling, or severe dizziness. If you notice any of these symptoms, seek medical help quickly.  Extra  Please speak with your doctor, nurse, or pharmacist if you have any questions about this medicine.  [email or web address]  IMPORTANT NOTE: This document tells you briefly how to take your medicine, but it does not tell you all there is to know about it. Your doctor or pharmacist may give you other documents about your medicine. Please talk to them if you have any questions. Always follow their advice. There is a more complete description of this medicine available in English. Scan this code on your smartphone or tablet or use the web address below. You can also ask your pharmacist for a printout. If you have any questions, please ask your pharmacist. The display and use of this drug information is subject to Terms of Use. Copyright(c) 2022 [employer]     5643-1007 [employer]. All rights reserved. This information is not intended as a substitute for professional medical care. Always follow your healthcare professional's instructions.          Adapting to Pregnancy: First Trimester  As your body adjusts during your first trimester of pregnancy, you may have to change or limit your daily activities. You ll need more rest. You may also need to use the energy you have more wisely.   Your changing body  Almost every part of your body is affected as you adapt to pregnancy. The uterus and cervix will start to soften right away. You may not look very pregnant during the first 3 months. But you are likely to have some common signs of early pregnancy:     Nausea    Fatigue    Frequent urination    Mood swings    Bloating of the belly    Constipation    Heartburn    Missed or light periods (first trimester bleeding)    Nipple or breast tenderness and breast swelling  It s not too late to start good habits   What matters most is protecting your baby from this moment on. If you smoke, drink alcohol, or use drugs, now is the time to stop. If you need help, talk with your healthcare provider:     Smoking increases the risk of stillbirth or having a low-birth-weight baby. If you smoke, quit now.    Alcohol and drugs have been linked with miscarriage, birth defects, intellectual disability, and low birth weight. Don't drink alcohol or take drugs.  Tips to relieve nausea  During pregnancy, nausea can happen at any time of the day, but it may be worse in the morning. To help prevent nausea:     Eat small, light meals at frequent intervals.    Drink fluids often.    Get up slowly. Eat a few unsalted crackers before you get out of bed.    Avoid smells that bother you.    Avoid spicy and fatty foods.    Eat an ice pop in your favorite flavor.    Get plenty of rest.    Ask your healthcare provider about taking cheryl or vitamin B6 for nausea and vomiting.    Talk  with your healthcare provider if you take vitamins that upset your stomach.   Work concerns  The end of the first trimester is a good time to discuss working during pregnancy with your employer. Follow your healthcare provider s advice if your job needs you to stand for a long time, work with hazardous tools, or even sit at a desk all day. Your workspace, workload, or scheduled hours may need to be adjusted. Perhaps you can change body postures more often or take an extra break.   Advice for travel  Talk to your healthcare provider first, but the second trimester may be the best time for any travel. You may be advised to avoid certain trips while you re pregnant. Food and water can be concerns in developing countries. Travel by car is a good choice, as you can stop, get out, and stretch. Bring snacks and water along. Fasten the lap belt below your belly, low over your hips. Also be sure to wear the shoulder harness.   Intimacy  Unless your healthcare provider tells you to, there's no reason to stop having sex while you re pregnant. You or your partner may notice changes in desire. Desire may be less in the first trimester, due to nausea and fatigue. In the second trimester, sex may be very enjoyable. The third trimester can be a challenge comfort-wise. Try different positions and see what s best for you both.   [employer] last reviewed this educational content on 4/1/2020 2000-2022 [employer]. All rights reserved. This information is not intended as a substitute for professional medical care. Always follow your healthcare professional's instructions.          Pregnancy: Common Questions  There are plenty of myths and  old wives  tales  about pregnancy. You may need help  fact from fiction. On this sheet, you ll find answers to a few common questions. If you have other questions, talk with your healthcare provider.    Will working harm my baby?  In most cases, working throughout your  pregnancy is not harmful at all. There may be concerns if the job involves dangerous machinery or chemicals, lifting, or standing for very long periods of time. Talk with your healthcare provider and employer about your particular job and pregnancy.  Is it safe to have sex during pregnancy?  Yes, unless you are specifically advised not to by your healthcare provider.  Why can t I change the cat litter box?  Cats carry a disease called toxoplasmosis. In adult humans, it shows up as a mild infection of the blood and organs. If you are infected during pregnancy, the baby s brain and eyes could be damaged. To be safe, have someone else change the litter. If you must handle it, wear a paper mask over your nose and mouth. Also, wear gloves and wash your hands afterward.  Which medicines are safe?  No prescription or over-the-counter medicine is safe for everyone all of the time. But sometimes medicines are needed. Be sure your healthcare provider knows you are pregnant. Then use only the medicines he or she advises you to take.  Is it true that I can overheat my baby?  Yes. To prevent making your baby too warm:    Don t sit in a Jacuzzi. A long, warm bath is fine, but not in water over 100 F (37.7 C).    Exercise less intensely if you feel tired. Base your workout on how you feel, not your heart rate. Heart rates aren t a good way to measure effort during pregnancy.  Can I lift and carry safely?  Yes, if your healthcare provider doesn t tell you otherwise. Learn to lift and carry safely to prevent injury and reduce back pain during pregnancy. To protect your back:    Bend at the knees to bring the load nearer.    Get a good . Test the weight of the load.    Tighten your belly. Exhale as you lift.    Lift with your legs, not with your back.    Carry the load close to your body.    Hold the load so you can see where you are going.  What if I get sick?  Most women get sick at least once during pregnancy. Talk with your  healthcare provider if you do. Most likely it will not affect your pregnancy. Get plenty of rest and fluids, and eat what you can. Talk with your provider before taking any medicines.  [email or web address] last reviewed this educational content on 8/1/2020 2000-2022 [employer]. All rights reserved. This information is not intended as a substitute for professional medical care. Always follow your healthcare professional's instructions.          Comfort Tips During Pregnancy  Pregnancy can bring discomfort of different kinds. Below are tips for ways to feel better. Talk with your healthcare provider before using pain-relieving medicine at any time during your pregnancy.    First trimester tips  Easing nausea    Get up slowly. Eat a few unsalted crackers before you get out of bed.    Avoid smells that bother you.    Eat small, bland, low-fat, high-protein meals at frequent intervals.    Sip on water, weak tea, or clear soft drinks, like ginger ale. Eat ice chips.    Try taking vitamin B6.  Coping with fatigue    Take catnaps when you can.    Get regular exercise.    Accept help from others.    Practice good sleep habits, like going to bed and getting up at the same time each day. Use your bed only for sleep and sex.  Calming mood swings    Talk about your feelings with others, including other mothers.    Limit sugar, chocolate, and caffeine.    Eat a healthy diet. Don t skip meals.    Get regular exercise.  Soothing headaches    Get fresh air and exercise.    Relax and get enough rest.    Check with your healthcare provider before taking any pain medicines.    Second trimester tips    To limit ankle swelling, sit with your feet raised or wear support hose.    If you have pain in your groin and stomach (round ligament pain), don't make sudden twisting movements with your body.    For leg cramps, flexing your foot often brings immediate relief. Also try massaging your calf in long, downward strokes, or stretching  your legs before going to bed. Get enough exercise and wear shoes with flexible soles. Eat foods rich in calcium.    Third trimester tips  Reducing heartburn    Eat small, light meals throughout the day rather than 3 large ones.    Sleep with your upper body raised 6 inches. Don t lie down until 2 hours after you eat.    Don't eat greasy, fried, or spicy foods.    Don't have citrus fruits or juices.  Treating constipation    Eat foods high in fiber, such as whole-grain foods, and fresh fruit and vegetables).    Drink plenty of water.    Get regular exercise.    Ask about your healthcare provider about medicines that have docusate or psyllium.  Taking care of your breasts    Don't use harsh soaps or alcohol, which can make your skin too dry.    Wear nursing bras. They provide more support than regular bras and can be used after pregnancy if you breastfeed.  Getting a good night s sleep    Take a warm shower before bed.    Sleep on a firm mattress.    Lie on your side with 1 leg crossed over the other.    Use pillows to support your arms, legs, and belly.    [employer] last reviewed this educational content on 8/1/2020 2000-2022 [employer]. All rights reserved. This information is not intended as a substitute for professional medical care. Always follow your healthcare professional's instructions.          Folic Acid Supplements  Folic acid is also called folate. It is one of the B vitamins. Nutrition experts are just starting to learn more about how folic acid helps the body. It is needed to prevent a shortage of red blood cells (a type of anemia). Experts have also found that folic acid can prevent some birth defects.     How much folic acid do you need?  Adults should have 400 mcg (micrograms) of folic acid each day. Adults may need more if they are planning to get pregnant, are pregnant, or are breastfeeding. Talk with your healthcare provider to find the right amount of folic acid for you.   Why  use a supplement?  Taking folic acid both before and during pregnancy is important. This can prevent birth defects of the spine and brain (neural tube defects). A supplement may also be helpful if you drink alcohol often. You may want to use a folic acid supplement if any of the following is true for you:   ? I am a person of childbearing age.   ? I am planning to get pregnant.  ? I rarely eat leafy green vegetables, dried beans, or lentils.   ? I rarely eat cereal and whole grains (wheat germ, brown rice, whole-wheat bread).  ? I often have more than 1 drink of alcohol a day.   If you take folic acid  Here are some tips to help you get the most from a folic acid supplement:    Read the label to be sure the product won't  soon.    Choose a supplement that provides 400 to 800 mcg of folic acid.    Store the supplement in a cool, dry place, away from sun and heat.    Eat a healthy diet to get all the nutrients your body needs.  Foods that have folic acid  Folic acid is found mainly in plants. Some good sources include:    Dark green leafy vegetables such as spinach, kale, collards, and harsh lettuce    Lentils, chickpeas, and dried beans such as buck, kidney, and black beans    Asparagus, bok johnna, broad-beans, broccoli, and [place] sprouts    Avocados, oranges, and orange juice    Wheat germ and whole-wheat products    Products fortified with folic acid, such as cereal, pasta, bread, and rice  [email or web address] last reviewed this educational content on 2022-2022 [employer]. All rights reserved. This information is not intended as a substitute for professional medical care. Always follow your healthcare professional's instructions.          Anemia During Pregnancy  Anemia is a condition in which the red blood cell count is too low. In pregnant women, this is often caused by not having enough iron in the blood. Anemia is common in pregnancy and very easy to treat.   Why you need iron   While  pregnant, your body uses iron to make red blood cells for you and your baby. These cells bring oxygen to your baby and to the rest of your body. Not having enough red blood cells can cause your baby to be born too small. But this is rare, as it s easy for you to get enough iron.   Testing for anemia   The only way to know if you have anemia is to have a simple test called a CBC (complete blood count). This is a routine test that will be done at one of your first prenatal visits. This test may be done again, at about week 26 to week 28.   Treating anemia   If you have anemia due to low iron content, follow the advice of your healthcare provider. Eating foods high in iron and taking supplements can help you get the iron you need.   Eating foods high in iron      Green leafy vegetables and nuts are a good source of iron.     Eat foods that are high in iron such as:     Red meat (limit organ meats such as liver)    Seafood (be sure it s fully cooked), and don't eat fish that are high in mercury, such as swordfish, tilefish, blu mackerel, and shark    Tofu    Eggs    Green, leafy vegetables    Whole grains and iron-fortified cereals    Dried fruits and nuts  Taking iron supplements   In most cases, a prenatal vitamin can provide enough iron. But if you need more, your healthcare provider may prescribe an iron supplement. Swallow iron pills with a glass of orange or cranberry juice. The vitamin C in these fruit juices can help your body absorb iron. But don t take your iron pills with juices that have calcium added to them. They can keep your body from absorbing the iron.   Iron supplements   Iron supplements may have certain side effects. They may cause your stools to turn black, and make you feel sick to your stomach or constipated. Here are some tips that may help you limit side effects:     Start slowly. Take 1 pill a day for a few days. Then work up to your prescribed dose over time.    Take your pills with meals,  and not at bedtime.    Increase the fiber in your diet. Eat more whole grains, fruits, and vegetables.    Do mild exercise each day.    If advised by your healthcare provider, take a stool softener.  [employer] last reviewed this educational content on 2/1/2020 2000-2022 [employer]. All rights reserved. This information is not intended as a substitute for professional medical care. Always follow your healthcare professional's instructions.          Rh-Negative Screening  If you have Rh-negative blood, your baby may be at risk for health problems. This is true only if your baby has Rh-positive blood. A simple test followed by treatment can help prevent problems.   What are the risks?  If the blood of your baby is Rh positive, your Rh-negative blood may form antibodies. These antibodies will attack the Rh-positive blood. This is called Rh disease. Rh disease can cause your baby to lose blood cells or have other health problems. Medical treatment can prevent Rh disease by keeping antibodies from forming.   How are you tested?  A simple blood test shows if you re Rh negative. This test is done very early in your pregnancy. If you re Rh negative, you ll have a second blood test near week 28 of pregnancy. This test will check whether or not your blood contains Rh antibodies.     [employer] last reviewed this educational content on 4/1/2020 2000-2022 [employer]. All rights reserved. This information is not intended as a substitute for professional medical care. Always follow your healthcare professional's instructions.          What Is Prenatal Care?  Before getting pregnant, you may have added some good health habits to get ready for your baby. But if you didn t, start today. One of the first steps is learning how to take care of yourself. See your healthcare provider as soon as you think you may be pregnant. Then continue prenatal care during your pregnancy.     Prenatal care helps you  have a healthy baby   During prenatal care:    Your healthcare provider checks the health of your pregnancy. They'll calculate a due date. This gives an estimate of your baby's delivery. Many people give birth between 38 and 41 weeks of pregnancy. Your due date is found by counting 40 weeks from the first day of your last menstrual period.    Your pregnancy's progress is checked. This includes your baby s growth, fetal heart rate, changes in your weight and blood pressure, and your overall health and comfort.    Your provider may find new concerns and manage current ones before problems happen.    Your provider will check lab work through blood and urine.    Your provider will talk about normal changes that happen during pregnancy. They'll also talk about changes that may not be normal. And they'll advise you about lifestyle changes.    Your provider will answer your questions. They'll also help you get ready for labor and delivery.  You're part of a team  When you re pregnant, you re part of a team. This team includes you, your baby, and your provider. It also may include a partner or a main support person. That could be a loved one, such as a spouse, a family member, or a friend. As you work to give your baby a healthy start, rely on your team members for support.   It s not too late to start good habits   What matters most is protecting your baby from this moment on. If you smoke, drink alcohol, or use illegal drugs, now's the time to stop. If you need help, talk with your healthcare provider.     Smoking increases the risk of losing your baby. Or of having a low-birth-weight baby. If you smoke, quit now.    Alcohol and drugs have been linked with many problems. These include miscarriage, birth defects, intellectual disability, and low birth weight. Stay away from alcohol and drugs.    Eat a healthy diet. This helps keep you and your baby strong and healthy. Follow your provider's instructions for nutrition. Also  stay within the guidelines you're given for healthy weight gain.    Take 400 micrograms to 800 micrograms (400 mcg to 800 mcg or 0.4 mg to 0.8 mg) of folic acid every day. Take it for at least 1 month before getting pregnant. And keep taking it for the first trimester of your pregnancy. This is to lower your risk of some brain and spinal birth defects. You can get folic acid from some foods. But it's hard to get all the folic acid you'll need from foods alone. Talk with your provider about taking a folic acid supplement.    Regular exercise will help you stay fit and feel good during pregnancy. It can also help prevent or reduce back pain. Talk with your provider about how to exercise safely during pregnancy.    If you have a health condition, make sure it's under control. Some conditions include asthma, diabetes, depression, high blood pressure, obesity, thyroid disease, or epilepsy. Be sure your vaccines are up to date.  How daily issues affect your health  Many things in your daily life impact your health. This can include transportation, money problems, housing, access to food, and . If you can t get to medical appointments, you may not receive the care you need. When money is tight, it may be difficult to pay for medicines. And living far from a grocery store can make it hard to buy healthy food.   If you have concerns in any of these or other areas, talk with your healthcare team. They may know of local resources to assist you. Or they may have a staff person who can help.   [employer] last reviewed this educational content on 8/1/2020 2000-2022 [employer]. All rights reserved. This information is not intended as a substitute for professional medical care. Always follow your healthcare professional's instructions.          Understanding Intimate Partner Violence  Intimate partner violence (IPV) affects hundreds of thousands of women. But the true number may be much higher because many  women may not report it. Partner violence often starts or gets worse during and after pregnancy. This may be from the stress of pregnancy and a new baby. IPV can result in pregnancy complications, poor postpartum care, and poor health of the baby.  You may feel alone if you are experiencing intimate partner violence during or after your pregnancy, but know that you re not. It s far more common than you think. And there s help and hope. Talk with your healthcare provider if you are not safe.  Types of intimate partner violence  Most people think of IPV as just physical abuse. While it does often include physical abuse, IPV can be emotional and sexual abuse. These other forms of abuse are sometimes harder to see. This is especially true for emotional abuse, because it can distort your own viewpoint.    Physical abuse includes using physical force to hurt or disable a partner. It can include throwing objects, pushing, kicking, biting, slapping, strangling, hitting, or beating.    Emotional abuse includes making threats, and controlling money or other resources. It s anything that erodes a person s sense of self-worth. It may look like name-calling, blaming, and stalking. It can also include isolation from friends, family, and even access to healthcare.    Sexual violence includes rape, unwanted kissing, and forced touching. It also includes reproductive coercion. This is holding power and control over the woman s reproductive health. It may look like efforts to sabotage contraception, having unsafe sex to purposefully expose the woman to sexually transmitted infections (STIs). It may also include forced  or injuries to cause a miscarriage.  Are you at risk for IPV?  IPV affects all genders, races, ethnicities, and social and economic statuses. But some people are at greater risk for being a victim or an abuser. Certain factors can increase the risk for IPV.  Individual factors    Having low self-esteem, being  "emotional dependent, insecure, and jealous    Having low income or being unemployed, having recent job loss or job instability    Being younger    Using alcohol or drugs    Being depressed, angry, having PTSD, or being hostile towards women    Having a history of abusing others, being abused, or witnessing abuse    Having poor problem solving skills    Having poor impulse control    Being a Native , , or  woman  Relationship factors    Marital or relationship conflict, frequent fighting    Having a jealous or possessive partner    Having control issues    Being under economic stress    Having poor social support    Having income inequality    It often helps to ask yourself these questions from the [employer] to know if you are in an abusive relationship:    Does my partner always put me down and make me feel bad about myself?    Has my partner caused harm or pain to my body?    Does my partner threaten me, the baby, my other children or himself?    Does my partner blame me for his actions? Does he tell me it's my own fault he hit me?    Is my partner becoming more violent as time goes on?    Has my partner promised never to hurt me again, but still does?  If you answered \"yes\" to any of these questions, you may be in an unhealthy relationship.  If you recognize yourself or your partner in any of these descriptors, talk with your healthcare provider to get help. If you are in danger, he or she can help you develop a safety plan.  Health effects  IPV during or after pregnancy can cause physical and emotional health effects, pregnancy complications, poor outcomes, and injury and harm to the baby after birth.  During pregnancy    Physical injuries such as bruises, cuts, or broken bones    Vaginal bleeding or pelvic pain    Early labor and delivery    Tearing of the placenta (placental abruption)    Maternal death  Infant health    Low infant birth weight    Infant who needs NICU " care    Broken bones    Death of   After birth  After birth, the mother may:    Have pain and other long-term health conditions    Develop postpartum depression (2 to 3 times greater risk)    Have high-risk sexual behaviors    Use harmful substances    Have unhealthy diet and lifestyle such as eating disorders  Abuse is never OK. One in 6 abused women are first abused during pregnancy, when it s dangerous to both you and your developing baby. Recognizing that you are in an abusive relationship is the first step to help. See your healthcare provider or someone else you trust who can help you develop a safety plan.  What to expect from your healthcare provider  It can be a hard to bring up partner violence with your healthcare provider. But it s an important first step in getting help. Rest assured, your conversation is confidential. He or she is a non-judgmental health professional. He or she likely has other patients with similar problems and recognizes the difficulty of the situation. Your provider may ask you questions such as:    Do you feel safe in your relationship?    Do you have a safe place to go in an emergency?    Do conflicts ever turn into physical fights?  Answer honestly and completely. There will be no pressure to disclose the abuse or to press charges. He or she won t ask about the abuse in front of a partner, friends, or family. The goal is to help you access help when you are ready.  Call   If you feel your safety is in jeopardy now, call or the police.  For more help  If the person who hurt you is your partner or spouse and your situation can become dangerous again, it's vital to make a safety plan. The National Domestic Violence Hotline can help you develop a plan that meets your personal situation.    National Domestic Violence Hotline , www.[employer].org, 817.377.4293  [employer] last reviewed this educational content on 2020-2022 [employer]. All rights  reserved. This information is not intended as a substitute for professional medical care. Always follow your healthcare professional's instructions.          Bleeding During Early Pregnancy   If you ve had bleeding early in your pregnancy, you re not alone. Many other pregnant women have early bleeding, too. And in most cases, nothing is wrong. But your healthcare provider still needs to know about it. They may want to do tests to find out why you re bleeding. Call your provider if you see bleeding during pregnancy. Tell your provider if your blood is Rh negative. Then they can figure out if you need anti-D immune globulin treatment.   What causes early bleeding?   The cause of bleeding early in pregnancy is often unknown. But many factors early on in pregnancy may lead to light bleeding (called spotting) or heavier bleeding. These include:     Having sex    When the embryo implants on the uterine wall    Bleeding between the sac membrane and the uterus (subchorionic bleeding)    Pregnancy loss (miscarriage)    The embryo implants outside of the uterus (ectopic pregnancy)  If you see spotting   Light bleeding is the most common type of bleeding in early pregnancy. If you see it, call your healthcare provider. Chances are, they will tell you that you can care for yourself at home.   If tests are needed   Depending on how much you bleed, your healthcare provider may ask you to come in for some tests. A pelvic exam, for instance, can help see how far along your pregnancy is. You also may have an ultrasound or a Doppler test. These imaging tests use sound waves to check the health of your baby. The ultrasound may be done on your belly or inside your vagina. You may also need a special blood test. This test compares your hormone levels in blood samples taken 2 days apart. The results can help your provider learn more about the implantation of the embryo. Your blood type will also need to be checked to assess if you will  need to be treated for Rh sensitization.       Ultrasound can help check the health of your fetus.     Warning signs   If your bleeding doesn t stop or if you have any of the following, get medical care right away:     Soaking a sanitary pad each hour    Bleeding like you re having a period    Cramping or severe belly pain    Feeling dizzy or faint    Tissue passing through your vagina    Bleeding at any time after the first trimester  Questions you may be asked   Bleeding early in pregnancy isn't normal. But it is common. If you ve seen any bleeding, you may be concerned. But keep in mind that bleeding alone doesn t mean something is wrong. Just be sure to call your healthcare provider right away. They may ask you questions like these to help find the cause of your bleeding:     When did your bleeding start?    Is your bleeding very light or is it like a period?    Is the blood bright red or brownish?    Have you had sex recently?    Have you had pain or cramping?    Have you felt dizzy or faint?  Monitoring your pregnancy   Bleeding will often stop as quickly as it began. Your pregnancy may go on a normal path again. You may need to make a few extra prenatal visits. But you and your baby will most likely be fine.   [employer] last reviewed this educational content on 1/1/2022 2000-2022 [employer]. All rights reserved. This information is not intended as a substitute for professional medical care. Always follow your healthcare professional's instructions.          Healthy Eating Habits During Pregnancy  It s important to develop healthy eating habits while you are pregnant, for you as well as for your baby. Here are some ways to stay healthy.   Aim for a healthy weight  A slow, steady rate of weight gain is often best. After the first trimester, you may gain about a pound a week. If you were overweight before pregnancy, you need to gain fewer pounds. Your healthcare provider can give you a healthy  weight goal for your pregnancy.   Don t diet  Now is not the time to diet. You may not get enough of the nutrients you and your baby need. Instead, learn how to be a healthy eater. Start by doing it for your baby. Soon, you may do it for yourself.   Vitamins and supplements  Talk with your healthcare provider about taking these and other prenatal vitamins and supplements.     Iron makes the extra blood you need now.    Calcium and vitamin D help build and keep strong bones.    Folic acid helps prevent certain birth defects.    Iodine helps the thyroid work right.    Some vitamins may not be safe to take. Your healthcare provider will tell you which ones to avoid.  Fluids    Drink at least 8 to 10 cups of fluid daily. Your baby needs fluids. Fluids also decrease constipation, flush out toxins and waste, limit swelling, and help prevent bladder infections. Water is best. Other good choices are:     Water or seltzer water with a slice of lemon or lime. (These can also help ease an upset stomach.)    Clear soups that are low in salt    Low-fat or fat-free milk, soy or rice milk with calcium added    Popsicles or gelatin  Things to avoid  Some things might harm your growing baby. Don t eat or drink:     Alcohol    Unpasteurized dairy foods and juices    Raw or undercooked meat, poultry, fish, or eggs    Unwashed fruits and vegetables    Prepared meats, like deli meats or hot dogs, unless heated until steaming hot    Fish that are high in mercury, like shark, swordfish, blu mackerel, tilefish, and albacore tuna  Things to limit  Ask your healthcare provider whether it s safe to eat or drink:     Caffeine    Artificial sweeteners    Organ meats    Certain types of fish    Fish and shellfish that contain mercury in lower amounts, like shrimp, canned light tuna, salmon, pollock, and catfish  [employer] last reviewed this educational content on 7/1/2021 2000-2022 [employer]. All rights reserved. This  information is not intended as a substitute for professional medical care. Always follow your healthcare professional's instructions.          Pregnancy: Planning Your Exercise Routine  While you re pregnant, an exercise routine helps both your mind and your body feel good. It tones your muscles and makes them stronger. It also gives you and your baby more oxygen.   The right exercise for you    Overall conditioning is best for you and your baby. Try walking, swimming, or riding a stationary bike. Always warm up, cool down, and drink enough fluids. Keep a snack close by in case your blood sugar gets low. Discuss exercise choices with your healthcare provider. Talk about the following:     If you already exercise, find out how to adapt your routine while you re pregnant. Keep the intensity of the exercise moderate. As your pregnancy progresses, your center of gravity will change. Be careful to keep your balance.    Ask if there are any local prenatal exercise classes, such as yoga or water aerobics. Find out which prenatal exercise videos are good choices.    If you were not exercising before your pregnancy, find out the best way to start. Now is not the time to begin a new workout on your own. Start slowly. Listen to your body.    Ask which forms of exercise you should avoid. These may include risky activities like hot yoga, horseback riding, scuba diving, skiing, skating, and contact sports.  Pelvic tilts  These help strengthen your stomach muscles and low back. You can do pelvic tilts instead of sit-ups.     Do this exercise on your hands and knees.    Relax the back of your neck. Pull your stomach in until your low back flattens.    Hold for 30 seconds. Release. Repeat 10 times. Do this twice a day.  Kegel exercises  Kegel exercises strengthen the pelvic muscles. Doing Kegels daily helps prepare these muscles for delivery. Kegels also help ease your recovery. You exercise these muscles by tightening, holding,  then relaxing them. To do 1 type of Kegel exercise, contract as if you were stopping your urine stream (but do it when you re not urinating). Hold for 10 seconds, then repeat 10 times, a few times a day.   Tips to add activity  Here are some tips to follow:    Park the car farther from a store and walk.    If you can, do errands on foot instead of driving.    Walk across the office to talk to someone in person instead of calling.    While waiting for appointments, go up and down stairs or around the block.  Tips to stay active  Here are some tips to follow:    Maintain your routine. But exercise less intensely if you feel tired.    Base your workout on how you feel, not your heart rate. Heart rates aren t a good way to measure effort during pregnancy.    Don't exercise on your back after week 16.  What are the warning signs that I should stop exercising?  Stop exercising and call your healthcare provider if you have any of these symptoms:    Vaginal bleeding     Dizziness or feeling faint     Increased shortness of breath     Chest pain     Headache     Muscle weakness     Calf (back of the leg) pain or swelling      Uterine contractions or  labor     Decreased fetal movement     Fluid leaking (or gushing) from your vagina  [employer] last reviewed this educational content on 2021-2022 [employer]. All rights reserved. This information is not intended as a substitute for professional medical care. Always follow your healthcare professional's instructions.          Nutrition During Pregnancy   Having a healthy baby depends mostly on you. What you eat matters to your baby and your health. During pregnancy, you will likely need about 300 more calories per day than before you became pregnant. Each day, try to eat the number of servings listed here for each food group. In addition, cut down on salt and caffeine. Limit the amount of sweets and high-fat foods you eat. Don t smoke or drink  alcohol.     Important: See your healthcare provider as often as requested. If you have any questions, be sure to ask them.   Fruits Vegetables Grains & Cereals* Fats & Oils   2 cups  Examples of 1-cup servings:   1 medium apple  1 medium orange  1 medium banana  1 cup chopped fruit  1 cup 100% fruit juice (pasteurized)   1/2 cup dried fruit 2-1/2 to 3 cups   Examples of 1 servin cups raw, leafy greens   1 cup raw or cooked cut-up vegetables   1 cup 100% vegetable juice (pasteurized)  6 to 8 ounces  Examples of 1-ounce servings:   1 slice bread  1/2 cup cooked rice  1/2 cup cooked cereal   1/2 cup pasta  1 ounce cold cereal 6 to 8 teaspoons   Dairy** Protein--- Fluids      3 cups  Examples of 1-cup servings:   1 cup milk  1 cup yogurt  1-1/2 ounces natural cheese   2 ounces processed cheese  5 to 6-1/2 ounces  Examples of 1-ounce servings:   1 egg  1 ounce of lean meat, poultry, or fish   1/4 cup cooked beans   1 tablespoon peanut butter   1/2 ounce nuts 8 or more 8-ounce glasses   Examples:  Water  Mineral water  Clear soups, broth      *Note: Choose whole grains whenever possible.   ** Note: Try to choose low-fat options; stay away from soft cheeses and unpasteurized milk.   --- Notes: Don't eat raw or undercooked meats, eggs, seafood, fish, and shellfish. Also, some types of fish, such as shark, swordfish, and blu mackerel, should not be eaten during pregnancy. Don't eat hot dogs, lunch meats, or cold cuts unless heated to steaming just before being served. Ask your healthcare provider about safe choices.   Prenatal supplements  A prenatal supplement is a pill that you take daily during pregnancy. It helps make sure you re getting the right amount of certain nutrients that are important to your baby. Ask your healthcare provider to help you choose the best one for you. Important nutrients during pregnancy include:     Folic acid. It's best to start taking this supplement 1 month before you start trying to  get pregnant. Folic acid helps prevent certain problems in your baby. During pregnancy, you need to take 400 micrograms (mcg) of folic acid every day for the first 2 to 3 months after conception. After that, 600 mcg is needed for a growing baby and placenta.    Iron, calcium, and vitamin D. You may also be advised to take these supplements during pregnancy. They help keep you and your baby healthy. Take them at different times because calcium makes it hard for the body to absorb iron. Taking iron with orange juice helps to increase its absorption.  [employer] last reviewed this educational content on 8/1/2020 2000-2022 [employer]. All rights reserved. This information is not intended as a substitute for professional medical care. Always follow your healthcare professional's instructions.          Pregnancy: Your Weight  Being a healthy weight is important for both you and your baby. The weight you gain now is not just extra fat. It is also the weight of your baby. And it is the increased blood and fluids to support the baby. A slow, steady rate of gain is best. How much you should gain depends on your weight before getting pregnant. Check with your healthcare provider to find out what is right for you.      Your weight will be checked regularly by your healthcare provider.     Talk to your healthcare provider if you have any questions.   If you gain too much  Gaining too much weight might cause you to feel tired, or you could have a harder pregnancy or birth. If you and your healthcare provider decide you re gaining too much:     Eat fewer fats and sugars. Instead, eat fruit, vegetables, and whole-grain foods.    Drink plenty of water between meals.    Get at least 20 minutes of light exercise, like walking, each day.    Don t diet. You might not get enough of the nutrients you and your baby need.    Keep a food diary to help you gauge what and how much you are eating.  If you're not gaining  enough  If you don t gain enough, your baby could be too small or have health problems. Women tend to gain most of their weight in the second and third trimesters. For now:     Eat many types of foods. Make sure you get enough calcium, protein, and carbohydrates.    Don t skip meals.    Eat healthy snacks.    Pick nutrient-dense, high-calorie healthy food like trail mix or protein shakes.    See a dietitian for help.    Talk to your healthcare provider if you have had an eating disorder or problems with certain foods.  The following are ways to get more calories:    Eat breakfast every day. Peanut butter or a slice of cheese on toast can give you an extra protein boost.    Snack between meals. Yogurt and dried fruits can provide protein, calcium, and minerals.    Try to eat more foods that are high in good fats, like nuts, fatty fish, avocados, and olive oil.    Drink juices made from real fruit that are high in vitamin C or beta-carotene, like grapefruit juice, orange juice, papaya nectar, apricot nectar, and carrot juice.    Don't eat junk food, like foods high in sugar.  [employer] last reviewed this educational content on 7/1/2021 2000-2022 [employer]. All rights reserved. This information is not intended as a substitute for professional medical care. Always follow your healthcare professional's instructions.        You have been provided the CDC Warning Signs in Pregnancy document.    Additional copies can be found here: [email or web address]

## 2023-06-15 LAB
ABO/RH(D): NORMAL
ANTIBODY SCREEN: NEGATIVE
SPECIMEN EXPIRATION DATE: NORMAL

## 2023-06-16 ENCOUNTER — PRENATAL OFFICE VISIT (OUTPATIENT)
Dept: OBGYN | Facility: CLINIC | Age: 33
End: 2023-06-16
Payer: COMMERCIAL

## 2023-06-16 VITALS
HEIGHT: 63 IN | WEIGHT: 202 LBS | TEMPERATURE: 98.8 F | HEART RATE: 97 BPM | RESPIRATION RATE: 16 BRPM | BODY MASS INDEX: 35.79 KG/M2 | SYSTOLIC BLOOD PRESSURE: 130 MMHG | DIASTOLIC BLOOD PRESSURE: 76 MMHG

## 2023-06-16 DIAGNOSIS — Z34.81 PRENATAL CARE, SUBSEQUENT PREGNANCY IN FIRST TRIMESTER: Primary | ICD-10-CM

## 2023-06-16 LAB — HBA1C MFR BLD: 5.3 %

## 2023-06-16 PROCEDURE — 76815 OB US LIMITED FETUS(S): CPT | Performed by: OBSTETRICS & GYNECOLOGY

## 2023-06-16 PROCEDURE — 86901 BLOOD TYPING SEROLOGIC RH(D): CPT | Performed by: OBSTETRICS & GYNECOLOGY

## 2023-06-16 PROCEDURE — 86850 RBC ANTIBODY SCREEN: CPT | Performed by: OBSTETRICS & GYNECOLOGY

## 2023-06-16 PROCEDURE — 87389 HIV-1 AG W/HIV-1&-2 AB AG IA: CPT | Performed by: OBSTETRICS & GYNECOLOGY

## 2023-06-16 PROCEDURE — 87086 URINE CULTURE/COLONY COUNT: CPT | Performed by: OBSTETRICS & GYNECOLOGY

## 2023-06-16 PROCEDURE — 86900 BLOOD TYPING SEROLOGIC ABO: CPT | Performed by: OBSTETRICS & GYNECOLOGY

## 2023-06-16 PROCEDURE — 86803 HEPATITIS C AB TEST: CPT | Performed by: OBSTETRICS & GYNECOLOGY

## 2023-06-16 PROCEDURE — 36415 COLL VENOUS BLD VENIPUNCTURE: CPT | Performed by: OBSTETRICS & GYNECOLOGY

## 2023-06-16 PROCEDURE — 87340 HEPATITIS B SURFACE AG IA: CPT | Performed by: OBSTETRICS & GYNECOLOGY

## 2023-06-16 PROCEDURE — 86780 TREPONEMA PALLIDUM: CPT | Performed by: OBSTETRICS & GYNECOLOGY

## 2023-06-16 PROCEDURE — 99203 OFFICE O/P NEW LOW 30 MIN: CPT | Mod: 25 | Performed by: OBSTETRICS & GYNECOLOGY

## 2023-06-16 PROCEDURE — 87491 CHLMYD TRACH DNA AMP PROBE: CPT | Performed by: OBSTETRICS & GYNECOLOGY

## 2023-06-16 PROCEDURE — 86762 RUBELLA ANTIBODY: CPT | Performed by: OBSTETRICS & GYNECOLOGY

## 2023-06-16 PROCEDURE — 87591 N.GONORRHOEAE DNA AMP PROB: CPT | Performed by: OBSTETRICS & GYNECOLOGY

## 2023-06-16 NOTE — NURSING NOTE
"Initial /76 (BP Location: Left arm, Patient Position: Chair, Cuff Size: Adult Regular)   Pulse 97   Temp 98.8  F (37.1  C) (Tympanic)   Resp 16   Ht 1.6 m (5' 3\")   Wt 91.6 kg (202 lb)   LMP 04/07/2023 (Exact Date)   BMI 35.78 kg/m   Estimated body mass index is 35.78 kg/m  as calculated from the following:    Height as of this encounter: 1.6 m (5' 3\").    Weight as of this encounter: 91.6 kg (202 lb). .      "

## 2023-06-16 NOTE — RESULT ENCOUNTER NOTE
The attached results were normal. Please follow any recommendations discussed in clinic.    Aparna Gilman MD          6/16/2023 3:49 PM

## 2023-06-16 NOTE — PROGRESS NOTES
[facility]    New OB Visit Note    CC: New OB     Subjective:    Gloria is a 32 year old  at 10w0d by Patient's last menstrual period was 2023 (within weeks).  who presents for her initial OB visit.  Her periods are 3 weeks apart, generally 15-18 days. This is a planned pregnancy and her and her partner Abdulaziz are excited. She reports feeling well. Denies any uterine cramping, abdominal pain or vaginal bleeding. Denies  nausea and vomiting.       Pt reports that she feels safe at home and her mood is good.   Concerns: wants work note for heavy lifting.    Past OB History: see below   H/O GDM and 2  sections    OB History    Para Term  AB Living   5 2 2 0 2 2   SAB IAB Ectopic Multiple Live Births   0 0 0 0 2      # Outcome Date GA Lbr Candelario/2nd Weight Sex Delivery Anes PTL Lv   5 Current            4 Term 18 40w0d  3.742 kg (8 lb 4 oz) F CS-Unspec   FREDDIE      Name: Tessa   3 AB 2017     IAB      2 Term 16 38w0d  3.402 kg (7 lb 8 oz) M CS-Unspec   FREDDIE      Birth Comments: jaundice      Complications: Fetal Intolerance      Name: Burak   1 AB      IAB          Past Medical History:   Diagnosis Date     Anxiety      Depression      Gestational diabetes mellitus      NO ACTIVE PROBLEMS      Postpartum depression 2016     PTSD (post-traumatic stress disorder)        Past Surgical History:   Procedure Laterality Date      SECTION      ,     PILONIDAL CYST / SINUS EXCISION         Current Outpatient Medications   Medication Sig Dispense Refill     loratadine (CLARITIN) 10 MG tablet Take 10 mg by mouth daily       Prenatal Vit-Fe Fumarate-FA (PRENATAL MULTIVITAMIN W/IRON) 27-0.8 MG tablet Take 1 tablet by mouth daily       Fluticasone Propionate (FLONASE NA)  (Patient not taking: Reported on 2023)       ketoconazole (NIZORAL) 2 % external cream Apply topically daily (Patient not taking: Reported on 2023) 30 g 0  "      Family History   Problem Relation Age of Onset     Depression Mother      Substance Abuse Mother         Alcoholic     Kidney Disease Mother      Bipolar Disorder Mother      Ulcers Mother         stomach     Hyperlipidemia Father      Hypertension Paternal Grandmother      Thrombosis Paternal Grandfather      Thyroid Disease Paternal Grandfather        Social History     Tobacco Use     Smoking status: Never     Smokeless tobacco: Never   Vaping Use     Vaping status: Never Used   Substance Use Topics     Alcohol use: No       ROS: A 10 pt ROS was completed and found to be negative unless mentioned in the HPI.     Objective:    /76 (BP Location: Left arm, Patient Position: Chair, Cuff Size: Adult Regular)   Pulse 97   Temp 98.8  F (37.1  C) (Tympanic)   Resp 16   Ht 1.6 m (5' 3\")   Wt 91.6 kg (202 lb)   LMP 04/07/2023 (Within Weeks)   BMI 35.78 kg/m      Estimated body mass index is 35.78 kg/m  as calculated from the following:    Height as of this encounter: 1.6 m (5' 3\").    Weight as of this encounter: 91.6 kg (202 lb).    General appearance: well-hydrated, A&O x 3, no apparent distress  Lungs: Equal expansion bilaterally, no accessory muscle use  Heart: No heaves or thrills. No peripheral varicosities  Constitutional: See vitals  Abdomen: Soft, non-tender, non-distended. No rebound, rigidity, or guarding.  Extremities: neg edema  Genitourinary:  External genitalia: no erythema, no lesions.   Urethral meatus appropriate location without lesions or prolapse  Urethra: No masses, tenderness, or scarring  Bladder no fullness, masses, or tenderness.  Anus and Perineum: Unremarkable, no visible lesions  Vagina: Normal, healthy pink mucosa without any lesions. Physiologic vaginal discharge.    Cervix: normal appearance, no cervical motion tenderness.   Uterus: gravid  Adnexa: no masses or tenderness bilaterally.    Bedside Ultrasound    Transabdominal ultrasound was performed. A viable intrauterine " pregnancy was seen.      CRL= 3.93 cm   FHT= 171 bpm  EGA= 10 weeks 6 days  EDC based on US = 24  EDC by LMP= 24 (irregular cycles, 3 weeks apart)  FINAL EDC= 24                                            Assessment and Plan:     Encounter Diagnosis   Name Primary?     Prenatal care, subsequent pregnancy in first trimester Yes       32 year old  at 10w0d  by ultrasound today who presents for her initial OB visit.     1) Plan to draw new OB lab today   2) Discussed routine prenatal care, group practice model at [facility], tertiary support and frequency of visits. Also discussed options for genetic screening tests. Patient desires NIPT for genetic testing.   3) Dating/viability US performed today    4) Concerns: history of GDM, will check HgA1c early.  2 prior  sections, planning repeat  5) PMH/OBHx problems: 2 prior  sections, no vaginal deliveries, not a candidate for TOLAC at [facility]  6) Medication review: no changes, continue prenatal vitamin   7) Reviewed miscarriage precautions (present to ED or call clinic with abdominal pain, vaginal bleeding or fever)   8) Weight gain: discussed weight gain expectations (BMI <18.5: 28-40lbs, BMI 18.5-25: 25-35lbs, BMI 25-30: 15-25lbs, BMI >30: 11-20lbs)   9) PAP smear: not due  10) Delivery plan: continue to discuss route of delivery, breastfeeding, pp contraception, pain management in labor  11) Immunizations: Tdap at 28wks   12) POS increased risk for gestational diabetes, will get early HgA1c  13) low risk for preeclampsia.      Return to clinic in 4 weeks or prn.

## 2023-06-17 LAB
C TRACH DNA SPEC QL PROBE+SIG AMP: NEGATIVE
HBV SURFACE AG SERPL QL IA: NONREACTIVE
HCV AB SERPL QL IA: NONREACTIVE
HIV 1+2 AB+HIV1 P24 AG SERPL QL IA: NONREACTIVE
N GONORRHOEA DNA SPEC QL NAA+PROBE: NEGATIVE
T PALLIDUM AB SER QL: NONREACTIVE

## 2023-06-18 LAB — BACTERIA UR CULT: NORMAL

## 2023-06-19 LAB
RUBV IGG SERPL QL IA: 10.8 INDEX
RUBV IGG SERPL QL IA: POSITIVE

## 2023-06-19 NOTE — RESULT ENCOUNTER NOTE
The attached results were normal. Please follow any recommendations discussed in clinic.    Aparna Gilman MD          6/19/2023 10:54 AM

## 2023-06-26 LAB — SCANNED LAB RESULT: NORMAL

## 2023-07-14 ENCOUNTER — PRENATAL OFFICE VISIT (OUTPATIENT)
Dept: OBGYN | Facility: CLINIC | Age: 33
End: 2023-07-14
Payer: COMMERCIAL

## 2023-07-14 ENCOUNTER — TELEPHONE (OUTPATIENT)
Dept: OBGYN | Facility: CLINIC | Age: 33
End: 2023-07-14

## 2023-07-14 VITALS
SYSTOLIC BLOOD PRESSURE: 128 MMHG | TEMPERATURE: 97.6 F | HEART RATE: 92 BPM | RESPIRATION RATE: 16 BRPM | WEIGHT: 205.2 LBS | BODY MASS INDEX: 36.36 KG/M2 | HEIGHT: 63 IN | DIASTOLIC BLOOD PRESSURE: 75 MMHG

## 2023-07-14 DIAGNOSIS — Z34.82 PRENATAL CARE, SUBSEQUENT PREGNANCY IN SECOND TRIMESTER: Primary | ICD-10-CM

## 2023-07-14 PROCEDURE — 99207 PR PRENATAL VISIT: CPT | Performed by: OBSTETRICS & GYNECOLOGY

## 2023-07-14 NOTE — PROGRESS NOTES
"Return OB  S: Feeling well. Nausea improved. No VB. Feeling FM.   O: /75 (BP Location: Left arm, Patient Position: Chair, Cuff Size: Adult Large)   Pulse 92   Temp 97.6  F (36.4  C) (Tympanic)   Resp 16   Ht 1.6 m (5' 3\")   Wt 93.1 kg (205 lb 3.2 oz)   LMP 2023 (Within Weeks)   BMI 36.35 kg/m      See OB flowsheet    A/P: 33 yo  at 14w6d by 10wk US, ROSETTE  Normal new OB labs  NIPT nl girl, offer AFP next appt   Anatomy scan ordered  2x c/s  Hx of gestational diabetes, hgba1c normal at new OB  BMI 36; 32 wks growth, BPPs wekkly at 37wk    RTC in 4wks  Precautions reviewed  Donna Castelan MD  OB/GYN   "

## 2023-07-14 NOTE — PATIENT INSTRUCTIONS
Pregnancy: Your Second Trimester Changes  Each day, you and your baby are changing and growing together. Here s a quick look at what s happening to both of you.  How you are changing  Even when you don t notice it, your body is adapting to meet the needs of your growing baby. The changes in your body might also affect your moods.  Your body  Your uterus expands as your baby grows. As the weeks go by, you will feel more pressure on your bladder, stomach, and other organs. You may notice some skin color changes on your forehead, nose, or cheeks. Freckles may darken, and moles may grow. You may notice a darker line on your abdomen between your belly button and pubic bone in the midline.  Your moods  The second trimester is often easier than the first. Still, be prepared for mood swings. These are from the increase in hormones made by your body. Hormones are chemicals that affect the way organs work. These mood swings are a normal part of pregnancy.  How your baby is growing    Month 4  Your baby s heartbeat may be heard with a Doppler (handheld ultrasound device) by 9 to 10 weeks. Eyebrows, eyelashes, and fingernails begin to form.    Month 5  You may feel your baby move. After a growth spurt, your baby nears 10 inches.    Month 6  Your baby s fingerprints have formed. Your baby weighs about 1 to 2 pounds and is about 12 inches long.    [employer] last reviewed this educational content on 7/1/2021 2000-2023 [employer]. All rights reserved. This information is not intended as a substitute for professional medical care. Always follow your healthcare professional's instructions.          Adapting to Pregnancy: Second Trimester  Keep up the healthy habits you started in your first trimester. You might be a little more tired than normal. So plan your day wisely. Look at the tips below and choose the ones that suit your lifestyle.   Note  If you have any questions, talk with your healthcare provider.   If  you work  If you can, adjust your work with your employer to fit your needs. Try these tips:   If you stand for long periods, find ways to do some tasks while sitting. Also, try to stand with 1 foot resting on a low stool or ledge. Shift your weight from foot to foot often. Wear low-heeled shoes.  If you sit, keep your knees level with your hips. Rest your feet on a firm surface. Sit tall with support for your low back.  If you work long hours, ask about adjusting your schedule. Try taking shorter breaks more often.  When you travel  The second trimester may be the best time for any travel. Talk to your healthcare provider about any special plans you may need to make. Always:   Wear a seat belt. Fasten the lap part under your belly. Wear the shoulder part also.  Take breaks often during long trips by car or plane. Move around to stretch your legs.  Drink plenty of fluids on flights. The air in plane cabins is very dry.  Stay out of hot climates or high altitudes if you are not used to them.  Stay away from places where the food and water might make you sick.  Make sure you are up-to-date on all vaccines, including the flu vaccine. This is especially important when traveling overseas.  Taking time to relax  Find time to rest and relax at work or at home:   Take short time-outs daily. Do relaxation exercises.  Breathe deeply during stressful times.  Try not to take on too much. Plan tasks for times when you have the most energy.  Take naps when you can. Or just sit and relax.  After week 16, don't lie on your back for more than a few minutes. Instead, lie on your side. Switch sides often.    Having sex  Unless your healthcare provider tells you otherwise, there is no reason to stop having sex now. Blood supply increases to the pelvic area in the second trimester. Because of this, sex might be more enjoyable. Try different positions and see what s best. Also talk with your partner about any changes in desire. Spotting  may happen after sex. Let your healthcare provider know if there is heavy bleeding.   Keeping your environment safe  You can still clean your house and use scented products. Just take some simple precautions:   Wear gloves when using cleaning fluids.  Open windows to let in fresh air. Use a fan if you paint.  Stay away from secondhand smoke.  Don t breathe fumes from nail polish, hair spray, cleansers, or other chemicals.  How daily issues affect your health  Many things in your daily life impact your health. This can include transportation, money problems, housing, access to food, and . If you can t get to medical appointments, you may not receive the care you need. When money is tight, it may be difficult to pay for medicines. And living far from a grocery store can make it hard to buy healthy food.   If you have concerns in any of these or other areas, talk with your healthcare team. They may know of local resources to assist you. Or they may have a staff person who can help.   [employer] last reviewed this educational content on 6/1/2021 2000-2023 [employer]. All rights reserved. This information is not intended as a substitute for professional medical care. Always follow your healthcare professional's instructions.

## 2023-07-14 NOTE — PROGRESS NOTES
"Initial /75 (BP Location: Left arm, Patient Position: Chair, Cuff Size: Adult Large)   Pulse 92   Temp 97.6  F (36.4  C) (Tympanic)   Resp 16   Ht 1.6 m (5' 3\")   Wt 93.1 kg (205 lb 3.2 oz)   LMP 04/07/2023 (Within Weeks)   BMI 36.35 kg/m   Estimated body mass index is 36.35 kg/m  as calculated from the following:    Height as of this encounter: 1.6 m (5' 3\").    Weight as of this encounter: 93.1 kg (205 lb 3.2 oz). .    "

## 2023-07-18 NOTE — TELEPHONE ENCOUNTER
Paperwork completed and signed by Dr. Tavarez faxed to [phone] mailed to [facility] [id number] [facility] [id number] per pt request.    Fidelina Blanco   [facility] [place]   [facility]  [phone]

## 2023-08-11 ENCOUNTER — HOSPITAL ENCOUNTER (OUTPATIENT)
Dept: ULTRASOUND IMAGING | Facility: CLINIC | Age: 33
Discharge: HOME | End: 2023-08-11
Attending: OBSTETRICS & GYNECOLOGY | Admitting: OBSTETRICS & GYNECOLOGY
Payer: COMMERCIAL

## 2023-08-11 ENCOUNTER — PRENATAL OFFICE VISIT (OUTPATIENT)
Dept: OBGYN | Facility: CLINIC | Age: 33
End: 2023-08-11
Payer: COMMERCIAL

## 2023-08-11 VITALS
WEIGHT: 211.1 LBS | BODY MASS INDEX: 37.4 KG/M2 | DIASTOLIC BLOOD PRESSURE: 61 MMHG | TEMPERATURE: 97.5 F | RESPIRATION RATE: 16 BRPM | SYSTOLIC BLOOD PRESSURE: 122 MMHG | HEIGHT: 63 IN | HEART RATE: 85 BPM

## 2023-08-11 DIAGNOSIS — Z34.82 PRENATAL CARE, SUBSEQUENT PREGNANCY IN SECOND TRIMESTER: Primary | ICD-10-CM

## 2023-08-11 DIAGNOSIS — Z34.82 PRENATAL CARE, SUBSEQUENT PREGNANCY IN SECOND TRIMESTER: ICD-10-CM

## 2023-08-11 DIAGNOSIS — O44.02 PLACENTA PREVIA IN SECOND TRIMESTER: Primary | ICD-10-CM

## 2023-08-11 PROCEDURE — 76805 OB US >/= 14 WKS SNGL FETUS: CPT

## 2023-08-11 PROCEDURE — 99207 PR PRENATAL VISIT: CPT | Performed by: OBSTETRICS & GYNECOLOGY

## 2023-08-11 NOTE — PATIENT INSTRUCTIONS

## 2023-08-11 NOTE — PROGRESS NOTES
"Return OB  S: Feeling well. Nausea improved. No VB. Feeling FM.   /61 (BP Location: Right arm, Patient Position: Sitting, Cuff Size: Adult Regular)   Pulse 85   Temp 97.5  F (36.4  C)   Resp 16   Ht 1.6 m (5' 3\")   Wt 95.8 kg (211 lb 1.6 oz)   LMP 2023 (Within Weeks)   BMI 37.39 kg/m        FHT 150s    A/P: 31 yo  at 18w6d by 10wk US, ROSETTE  Normal new OB labs  NIPT nl girl, offer AFP next appt   Anatomy scan ordered  2x c/s  Hx of gestational diabetes, hgba1c normal at new OB  BMI 36; 32 wks growth, BPPs wekkly at 37wk   Placenta previa- planning repeat regardless, discussed bleeding precautions, pelvic rest. Plan to recheck with reevaluation of fetal spine not well seen today    RTC in 4wks    Heike Rincon MD  2023 1:18 PM   "

## 2023-08-26 ENCOUNTER — HOSPITAL ENCOUNTER (EMERGENCY)
Facility: CLINIC | Age: 33
Discharge: HOME | End: 2023-08-26
Attending: PHYSICIAN ASSISTANT | Admitting: PHYSICIAN ASSISTANT
Payer: COMMERCIAL

## 2023-08-26 VITALS
TEMPERATURE: 96.8 F | DIASTOLIC BLOOD PRESSURE: 73 MMHG | HEART RATE: 81 BPM | SYSTOLIC BLOOD PRESSURE: 136 MMHG | WEIGHT: 211 LBS | HEIGHT: 63 IN | RESPIRATION RATE: 14 BRPM | BODY MASS INDEX: 37.39 KG/M2 | OXYGEN SATURATION: 98 %

## 2023-08-26 DIAGNOSIS — T63.441A BEE STING: ICD-10-CM

## 2023-08-26 PROCEDURE — 99213 OFFICE O/P EST LOW 20 MIN: CPT | Performed by: PHYSICIAN ASSISTANT

## 2023-08-26 PROCEDURE — G0463 HOSPITAL OUTPT CLINIC VISIT: HCPCS | Performed by: PHYSICIAN ASSISTANT

## 2023-08-26 NOTE — ED PROVIDER NOTES
History   No chief complaint on file.    HPI  Gloria Rodríguez is a 33 year old female who is currently 21 weeks pregnant who presents to the urgent care with concern over bee sting to the right side of her back which occurred at work less than 30 minutes prior to arrival.  Patient reports she was stung on the back at work. She had sudden onset of localized erythema, pruritus, pain and change in skin sensation.  She denies any generalized rash, sore throat, voice changes, cough, shortness of breath, wheezing, abdominal pain.  Patient states concern that she had a bee sting to her right forearm which had increasing erythema swelling and warmth more than 24 hours after initial exposure.  She was evaluated in the urgent care and treated with Decadron and Keflex for possible cellulitis.     Allergies:  Allergies   Allergen Reactions    Banana Hives    Fruit Extracts      Bananas, itchy mouth    Ibuprofen Sodium Hives    Oseltamivir      Other reaction(s): Tremors    Sulfa Antibiotics Hives    Other [No Clinical Screening - See Comments] Rash     bleach    Silver Rash    Sodium Hypochlorite Other (See Comments) and Rash     Problem List:    Patient Active Problem List    Diagnosis Date Noted    Diet controlled gestational diabetes mellitus (GDM), antepartum 2018     Priority: Medium    S/P repeat low transverse  2018     Priority: Medium    Prenatal care, subsequent pregnancy 10/06/2017     Priority: Medium     FOB- Abdulaziz Kothari      Pilonidal cyst 2015     Priority: Medium    CARDIOVASCULAR SCREENING; LDL GOAL LESS THAN 130      Priority: Medium    NO ACTIVE PROBLEMS 11/15/2012     Priority: Medium        Past Medical History:    Past Medical History:   Diagnosis Date    Anxiety     Depression     Gestational diabetes mellitus 2018    NO ACTIVE PROBLEMS     Postpartum depression 2016    PTSD (post-traumatic stress disorder)      Past Surgical History:    Past Surgical History:   Procedure  "Laterality Date     SECTION      ,    PILONIDAL CYST / SINUS EXCISION       Family History:    Family History   Problem Relation Age of Onset    Depression Mother     Substance Abuse Mother         Alcoholic    Kidney Disease Mother     Bipolar Disorder Mother     Ulcers Mother         stomach    Hyperlipidemia Father     Hypertension Paternal Grandmother     Thrombosis Paternal Grandfather     Thyroid Disease Paternal Grandfather        Social History:  Marital Status:  Single [1]  Social History     Tobacco Use    Smoking status: Never    Smokeless tobacco: Never   Vaping Use    Vaping Use: Never used   Substance Use Topics    Alcohol use: No    Drug use: Yes     Comment: medical marijuana- quit with pregnancy        Medications:    loratadine (CLARITIN) 10 MG tablet  Prenatal Vit-Fe Fumarate-FA (PRENATAL MULTIVITAMIN W/IRON) 27-0.8 MG tablet      Review of Systems  CONSTITUTIONAL:NEGATIVE for fever, chills, change in weight  INTEGUMENTARY/SKIN: POSITIVE for bee sting   EYES: NEGATIVE for vision changes or irritation  ENT/MOUTH: POSITIVE for ongoing nasal congestion and NEGATIVE for sore throat, voice changes   RESP:NEGATIVE for significant cough or SOB  GI: NEGATIVE for abdominal pain, diarrhea, nausea, and vomiting  Physical Exam    /73   Pulse 81   Temp 96.8  F (36  C) (Tympanic)   Resp 14   Ht 1.6 m (5' 3\")   Wt 95.7 kg (211 lb)   LMP 2023 (Within Weeks)   SpO2 98%   BMI 37.38 kg/m    Physical Exam  GENERAL APPEARANCE: healthy, alert and no distress  EYES: EOMI,  PERRL, conjunctiva clear  HENT: normal voice quality, no swelling/edema   RESP: lungs clear to auscultation - no rales, rhonchi or wheezes  CV: regular rates and rhythm, normal S1 S2, no murmur note  SKIN: 5 cm by 7 cm area of erythema on right thoracic back at site of bee sting  ED Course               Procedures       Critical Care time:  none        No results found for this or any previous visit (from the past " 24 hour(s)).  Medications - No data to display    Assessments & Plan (with Medical Decision Making)     I have reviewed the nursing notes.    I have reviewed the findings, diagnosis, plan and need for follow up with the patient.       New Prescriptions    No medications on file     Final diagnoses:   Bee sting     33 year old female presents to the UC with concern over bee sting which occurred at work less than 30 minutes prior to arrival.  She had stable vital signs upon arrival.  Physical exam findings as described above are consistent with bee sting with localized reaction.  She did not have any signs or symptoms concerning for anaphylaxis at this time.  She was discharged home stable with instructions for continued use of OTC oral antihistamine, may use topical steroids, ice as well as tylenol as needed for pain.  Discussed that local symptoms may continue to worsen for 48-72 hours after initial sting.  Follow up with PCP if no improvement in 3-5 days.  Worrisome reasons to return to ER/UC sooner discussed.     Disclaimer: This note consists of symbols derived from keyboarding, dictation, and/or voice recognition software. As a result, there may be errors in the script that have gone undetected.  Please consider this when interpreting information found in the chart.    8/26/2023   [facility] EMERGENCY DEPT       Aditi Wallis PA-C  08/26/23 [id number]

## 2023-09-07 ENCOUNTER — PRENATAL OFFICE VISIT (OUTPATIENT)
Dept: OBGYN | Facility: CLINIC | Age: 33
End: 2023-09-07
Payer: COMMERCIAL

## 2023-09-07 VITALS
WEIGHT: 210.2 LBS | RESPIRATION RATE: 14 BRPM | SYSTOLIC BLOOD PRESSURE: 117 MMHG | HEIGHT: 63 IN | BODY MASS INDEX: 37.25 KG/M2 | HEART RATE: 77 BPM | TEMPERATURE: 96.7 F | DIASTOLIC BLOOD PRESSURE: 72 MMHG

## 2023-09-07 DIAGNOSIS — Z98.891 HISTORY OF CESAREAN SECTION: ICD-10-CM

## 2023-09-07 DIAGNOSIS — Z34.82 PRENATAL CARE, SUBSEQUENT PREGNANCY IN SECOND TRIMESTER: Primary | ICD-10-CM

## 2023-09-07 DIAGNOSIS — O44.02 PLACENTA PREVIA IN SECOND TRIMESTER: ICD-10-CM

## 2023-09-07 DIAGNOSIS — Z86.32 HISTORY OF GESTATIONAL DIABETES: ICD-10-CM

## 2023-09-07 PROBLEM — O24.410 DIET CONTROLLED GESTATIONAL DIABETES MELLITUS (GDM), ANTEPARTUM: Status: RESOLVED | Noted: 2018-04-04 | Resolved: 2023-09-07

## 2023-09-07 PROCEDURE — 99207 PR PRENATAL VISIT: CPT | Performed by: OBSTETRICS & GYNECOLOGY

## 2023-09-07 NOTE — PROGRESS NOTES
"[facility]    Return OB Note    CC: Return OB     Subjective:  Gloria is a 33 year old  at 22w5d   Denies vaginal bleeding, loss of fluid, or regular contractions. Good fetal movement.  Complaints today:   Traveling to the [place] for a cruise mid October. Discussed travel precautions, still OK for travel in second trimester.    Objective:  /72 (BP Location: Right arm, Patient Position: Sitting, Cuff Size: Adult Regular)   Pulse 77   Temp (!) 96.7  F (35.9  C) (Tympanic)   Resp 14   Ht 1.6 m (5' 3\")   Wt 95.3 kg (210 lb 3.2 oz)   LMP 2023 (Within Weeks)   BMI 37.24 kg/m      Fundal height: 23cm  FHT: 150bpm    Assessment/Plan:   Encounter Diagnoses   Name Primary?    Prenatal care, subsequent pregnancy in second trimester Yes    History of  section     Placenta previa in second trimester     History of gestational diabetes        IUP at 22w5d  -NOB labs WNL  -Prenatal screening: NIPT low risk  -Anatomy US: placenta previa and suboptimal views. Repeat US is ordered.    Co-morbidities/complications/concerns:  -Placenta previa: has had no bleeding. She was given pelvic rest precautions but has been having intercourse. Reiterated recommendation today and risk for bleeding and hemorrhage which can cause maternal and fetal complications. She has follow up US ordered, plan to schedule for prior to her upcoming trip in October.   -History of C section x2: planning repeat, timing to be determined by placenta previa and if this resolves  -History of gestational diabetes: baseline Hba1c WNL  -Strict return precautions given    RTC 4 weeks    Makeda Johnston DO    "

## 2023-09-07 NOTE — PATIENT INSTRUCTIONS
"Weeks 18 to 22 of Your Pregnancy: Care Instructions  At this stage you may find that your nausea and fatigue are gone. You may feel better overall and have more energy. But you might now also have some new discomforts, like sleep problems or leg cramps.    You may start to feel your baby move. These movements can feel like butterflies or bubbles.   Babies at this stage can now suck their thumbs.     Get some exercise every day.  And avoid caffeine late in the day.     Take a warm shower or bath before bed.  Try relaxation exercises to calm your mind and body.     Use extra pillows.  They can help you get comfortable.     Don't use sleeping pills or alcohol.  They could harm your baby.     For leg cramps, stretch and apply heat.  A warm bath, leg warmers, a heating pad, or a hot water bottle can help with muscle aches.   Stretches for leg cramps    Straighten your leg and bend your foot (flex your ankle) slowly upward, toward your knee. Bend your toes up and down.   Stand on a flat surface. Stretch your toes upward. For balance, hold on to the wall or something stable. If it feels okay, take small steps walking on your heels.   Follow-up care is a key part of your treatment and safety. Be sure to make and go to all appointments, and call your doctor if you are having problems. It's also a good idea to know your test results and keep a list of the medicines you take.  Where can you learn more?  Go to https://www.[employer].net/patiented  Enter W603 in the search box to learn more about \"Weeks 18 to 22 of Your Pregnancy: Care Instructions.\"  Current as of: November 9, 2022               Content Version: 13.7    7044-1813 [employer].   Care instructions adapted under license by your healthcare professional. If you have questions about a medical condition or this instruction, always ask your healthcare professional. [employer] disclaims any warranty or liability for your use of this " "information.      Weeks 22 to 26 of Your Pregnancy: Care Instructions  Your baby's lungs are getting ready for breathing. Your baby may respond to your voice. Your baby likely turns less, and kicks or jerks more. Jerking may mean that your baby has hiccups.    Think about taking childbirth classes. And start to think about whether you want to have pain medicine during labor.   At your next doctor visit, you may be tested for anemia and for high blood sugar that first occurs during pregnancy (gestational diabetes). These conditions can cause problems for you and your baby.     To ease discomfort, such as back pain    Change your position often. Try not to sit or stand for too long.  Get some exercise. Things like walking or stretching may help.  Try using a heating pad or cold pack.    To ease or reduce swelling in your feet, ankles, hands, and fingers    Take off your rings.  Avoid high-sodium foods, such as potato chips.  Prop up your feet, and sleep with pillows under your feet.  Try to avoid standing for long periods of time.  Do not wear tight shoes.  Wear support stockings.  Kegel exercises to prevent urine from leaking    Squeeze your muscles as if you were trying not to pass gas. Your belly, legs, and buttocks shouldn't move. Hold the squeeze for 3 seconds, then relax for 5 to 10 seconds.    Add 1 second each week until you can squeeze for 10 seconds. Repeat the exercise 10 times a session. Do 3 to 8 sessions a day. If these exercises cause you pain, stop doing them and talk with your doctor.  Follow-up care is a key part of your treatment and safety. Be sure to make and go to all appointments, and call your doctor if you are having problems. It's also a good idea to know your test results and keep a list of the medicines you take.  Where can you learn more?  Go to https://www.healthwise.net/patiented  Enter G264 in the search box to learn more about \"Weeks 22 to 26 of Your Pregnancy: Care " "Instructions.\"  Current as of: November 9, 2022               Content Version: 13.7    3501-5397 [employer].   Care instructions adapted under license by your healthcare professional. If you have questions about a medical condition or this instruction, always ask your healthcare professional. [employer] disclaims any warranty or liability for your use of this information.      Round Ligament Pain: Care Instructions  Your Care Instructions     Round ligament pain is a common pain during pregnancy. You may feel a sharp brief pain on one or both sides of your belly. It may go down into your groin. It's usually felt for the first time during the second trimester.  This pain is a normal part of pregnancy. It will go away as your pregnancy continues or after your baby is born.  Your uterus is supported by two ligaments that go from the top and sides of the uterus to the bones of the pelvis. These are the round ligaments. As your uterus grows, these ligaments stretch and tighten with your movements. This may be the cause of the pain. You may find that certain activities seem to cause pain. If you can, avoid those activities.  Your doctor can usually diagnose round ligament pain from your symptoms and an exam. If you have bleeding or other symptoms, your doctor may also do an imaging test, such as an ultrasound. Your doctor may suggest that you take an over-the-counter pain medicine, such as acetaminophen.  Follow-up care is a key part of your treatment and safety. Be sure to make and go to all appointments, and call your doctor if you are having problems. It's also a good idea to know your test results and keep a list of the medicines you take.  How can you care for yourself at home?  If certain movements seem to trigger belly pain, see if you can avoid them while you are pregnant.  Stay active. If your doctor says it's okay, try moderate exercise. Water exercise may be a good choice if you have " "belly pain. Examples are swimming and water aerobics.  Ask your doctor about taking acetaminophen for pain. Be safe with medicines. Read and follow all instructions on the label.  When should you call for help?   Call your doctor now or seek immediate medical care if:    You think you might be in labor.     You have new or worse pain.   Watch closely for changes in your health, and be sure to contact your doctor if you have any problems.  Where can you learn more?  Go to https://www.[employer].net/patiented  Enter R110 in the search box to learn more about \"Round Ligament Pain: Care Instructions.\"  Current as of: November 9, 2022               Content Version: 13.7    7954-8450 [employer].   Care instructions adapted under license by your healthcare professional. If you have questions about a medical condition or this instruction, always ask your healthcare professional. [employer] disclaims any warranty or liability for your use of this information.      Leg and Ankle Edema: Care Instructions  Your Care Instructions  Swelling in the legs, ankles, and feet is called edema. It is common after you sit or stand for a while. Long plane flights or car rides often cause swelling in the legs and feet. You may also have swelling if you have to stand for long periods of time at your job. Problems with the veins in the legs (varicose veins) and changes in hormones can also cause swelling. Sometimes the swelling in the ankles and feet is caused by a more serious problem, such as heart failure, infection, blood clots, or liver or kidney disease.  Follow-up care is a key part of your treatment and safety. Be sure to make and go to all appointments, and call your doctor if you are having problems. It's also a good idea to know your test results and keep a list of the medicines you take.  How can you care for yourself at home?  If your doctor gave you medicine, take it as prescribed. Call your doctor if " "you think you are having a problem with your medicine.  Whenever you are resting, raise your legs up. Try to keep the swollen area higher than the level of your heart.  Take breaks from standing or sitting in one position.  Walk around to increase the blood flow in your lower legs.  Move your feet and ankles often while you stand, or tighten and relax your leg muscles.  Wear support stockings. Put them on in the morning, before swelling gets worse.  Eat a balanced diet. Lose weight if you need to.  Limit the amount of salt (sodium) in your diet. Salt holds fluid in the body and may increase swelling.  When should you call for help?   Call 911 anytime you think you may need emergency care. For example, call if:    You have symptoms of a blood clot in your lung (called a pulmonary embolism). These may include:  Sudden chest pain.  Trouble breathing.  Coughing up blood.   Call your doctor now or seek immediate medical care if:    You have signs of a blood clot, such as:  Pain in your calf, back of the knee, thigh, or groin.  Redness and swelling in your leg or groin.     You have symptoms of infection, such as:  Increased pain, swelling, warmth, or redness.  Red streaks or pus.  A fever.   Watch closely for changes in your health, and be sure to contact your doctor if:    Your swelling is getting worse.     You have new or worsening pain in your legs.     You do not get better as expected.   Where can you learn more?  Go to https://www.[employer].net/patiented  Enter N696 in the search box to learn more about \"Leg and Ankle Edema: Care Instructions.\"  Current as of: November 14, 2022               Content Version: 13.7    1838-2986 [employer].   Care instructions adapted under license by your healthcare professional. If you have questions about a medical condition or this instruction, always ask your healthcare professional. [employer] disclaims any warranty or liability for your use of this " information.      Back Pain During Pregnancy: Care Instructions  Overview     Back pain has many possible causes. It is often caused by problems with muscles and ligaments in your back. The extra weight during pregnancy can put stress on your back. Moving, lifting, standing, sitting, or sleeping in an awkward way also can strain your back. Back pain can also be a sign of labor. Although it may hurt a lot, back pain often improves on its own. Use good home treatment, and take care not to stress your back.  Follow-up care is a key part of your treatment and safety. Be sure to make and go to all appointments, and call your doctor if you are having problems. It's also a good idea to know your test results and keep a list of the medicines you take.  How can you care for yourself at home?  Ask your doctor about taking acetaminophen (Tylenol) for pain. Do not take aspirin, ibuprofen (Advil, Motrin), or naproxen (Aleve).  Do not take two or more pain medicines at the same time unless the doctor told you to. Many pain medicines have acetaminophen, which is Tylenol. Too much acetaminophen (Tylenol) can be harmful.  Lie on your side with your knees and hips bent and a pillow between your legs. This reduces stress on your back.  Put ice or cold packs on your back for 10 to 20 minutes at a time, several times a day. Put a thin cloth between the ice and your skin.  Warm baths may also help reduce pain.  Change positions every 30 minutes. Take breaks if you must sit for a long time. Get up and walk around.  Ask your doctor about how much exercise you can do. You may feel better taking short walks or doing gentle movements and stretching in a swimming pool.  Ask your doctor about exercises to stretch and strengthen your back.  When should you call for help?   Call your doctor now or seek immediate medical care if:    You think you are in labor.     You have new numbness in your buttocks, genital or rectal areas, or legs.     You  "have a new loss of bowel or bladder control.   Watch closely for changes in your health, and be sure to contact your doctor if:    You do not get better as expected.   Where can you learn more?  Go to https://www.[employer].net/patiented  Enter C696 in the search box to learn more about \"Back Pain During Pregnancy: Care Instructions.\"  Current as of: 2022               Content Version: 13.7    4343-7327 [employer].   Care instructions adapted under license by your healthcare professional. If you have questions about a medical condition or this instruction, always ask your healthcare professional. [employer] disclaims any warranty or liability for your use of this information.       Labor: Care Instructions  Overview      labor is the start of labor between 20 and 36 weeks of pregnancy. Most babies are born at 37 to 42 weeks of pregnancy. In labor, the uterus contracts to open the cervix. This is the first stage of childbirth.  labor can be caused by a problem with the baby, the mother, or both. Often the cause is not known.  In some cases, doctors use medicines to try to delay labor until 34 or more weeks of pregnancy. By this time, a baby has grown enough so that problems are not likely. In some cases--such as with a serious infection--it is healthier for the baby to be born early. Your treatment will depend on how far along you are in your pregnancy and on your health and your baby's health.  Follow-up care is a key part of your treatment and safety. Be sure to make and go to all appointments, and call your doctor if you are having problems. It's also a good idea to know your test results and keep a list of the medicines you take.  How can you care for yourself at home?  If your doctor prescribed medicines, take them exactly as directed. Call your doctor if you think you are having a problem with your medicine.  Rest until your doctor advises you about " "activity.  Do not have sexual intercourse unless your doctor says it is safe.  Use sanitary pads if you have vaginal bleeding. Using pads makes it easier to monitor your bleeding.  Do not smoke or allow others to smoke around you. If you need help quitting, talk to your doctor about stop-smoking programs and medicines. These can increase your chances of quitting for good.  When should you call for help?   Call 911  anytime you think you may need emergency care. For example, call if:    You passed out (lost consciousness).     You have a seizure.     You have severe vaginal bleeding.     You have severe pain in your belly or pelvis that doesn't get better between contractions.     You have had fluid gushing or leaking from your vagina and you know or think the umbilical cord is bulging into your vagina. If this happens, immediately get down on your knees so your rear end (buttocks) is higher than your head. This will decrease the pressure on the cord until help arrives.   Call your doctor now or seek immediate medical care if:    You have signs of preeclampsia, such as:  Sudden swelling of your face, hands, or feet.  New vision problems (such as dimness, blurring, or seeing spots).  A severe headache.     You have any vaginal bleeding.     You have belly pain or cramping.     You have a fever.     You have had regular contractions (with or without pain) for an hour. This means that you have 6 or more within 1 hour after you change your position and drink fluids.     You have a sudden release of fluid from the vagina.     You have low back pain or pelvic pressure that does not go away.     You notice that your baby has stopped moving or is moving much less than normal.   Watch closely for changes in your health, and be sure to contact your doctor if you have any problems.  Where can you learn more?  Go to https://www.healthwise.net/patiented  Enter Q400 in the search box to learn more about \" Labor: Care " "Instructions.\"  Current as of: November 9, 2022               Content Version: 13.7    8193-4555 [employer].   Care instructions adapted under license by your healthcare professional. If you have questions about a medical condition or this instruction, always ask your healthcare professional. [employer] disclaims any warranty or liability for your use of this information.      "

## 2023-09-07 NOTE — NURSING NOTE
" Initial /72 (BP Location: Right arm, Patient Position: Sitting, Cuff Size: Adult Regular)   Pulse 77   Temp (!) 96.7  F (35.9  C) (Tympanic)   Resp 14   Ht 1.6 m (5' 3\")   Wt 95.3 kg (210 lb 3.2 oz)   LMP 04/07/2023 (Within Weeks)   BMI 37.24 kg/m   Estimated body mass index is 37.24 kg/m  as calculated from the following:    Height as of this encounter: 1.6 m (5' 3\").    Weight as of this encounter: 95.3 kg (210 lb 3.2 oz). .      "

## 2023-09-08 ENCOUNTER — HOSPITAL ENCOUNTER (OUTPATIENT)
Dept: ULTRASOUND IMAGING | Facility: CLINIC | Age: 33
Discharge: HOME | End: 2023-09-08
Attending: OBSTETRICS & GYNECOLOGY | Admitting: OBSTETRICS & GYNECOLOGY
Payer: COMMERCIAL

## 2023-09-08 DIAGNOSIS — Z34.82 PRENATAL CARE, SUBSEQUENT PREGNANCY IN SECOND TRIMESTER: ICD-10-CM

## 2023-09-08 PROCEDURE — 76816 OB US FOLLOW-UP PER FETUS: CPT

## 2023-09-10 ENCOUNTER — HOSPITAL ENCOUNTER (OUTPATIENT)
Facility: CLINIC | Age: 33
Discharge: HOME | End: 2023-09-10
Attending: OBSTETRICS & GYNECOLOGY | Admitting: OBSTETRICS & GYNECOLOGY
Payer: COMMERCIAL

## 2023-09-10 ENCOUNTER — HOSPITAL ENCOUNTER (EMERGENCY)
Facility: CLINIC | Age: 33
Discharge: STILL ADMITTED OR ADMINISTRATIVE | End: 2023-09-10
Payer: COMMERCIAL

## 2023-09-10 ENCOUNTER — HOSPITAL ENCOUNTER (OUTPATIENT)
Facility: CLINIC | Age: 33
End: 2023-09-10
Admitting: OBSTETRICS & GYNECOLOGY
Payer: COMMERCIAL

## 2023-09-10 VITALS
HEART RATE: 80 BPM | RESPIRATION RATE: 16 BRPM | SYSTOLIC BLOOD PRESSURE: 114 MMHG | TEMPERATURE: 98.3 F | OXYGEN SATURATION: 97 % | DIASTOLIC BLOOD PRESSURE: 67 MMHG

## 2023-09-10 PROBLEM — Z36.89 ENCOUNTER FOR TRIAGE IN PREGNANT PATIENT: Status: ACTIVE | Noted: 2023-09-10

## 2023-09-10 PROCEDURE — G0463 HOSPITAL OUTPT CLINIC VISIT: HCPCS

## 2023-09-10 RX ORDER — LIDOCAINE 40 MG/G
CREAM TOPICAL
Status: DISCONTINUED | OUTPATIENT
Start: 2023-09-10 | End: 2023-09-10 | Stop reason: HOSPADM

## 2023-09-10 ASSESSMENT — ACTIVITIES OF DAILY LIVING (ADL): ADLS_ACUITY_SCORE: 35

## 2023-09-11 NOTE — PROGRESS NOTES
Patient arrived from home with significant other and children. after being stung by bees about 7 times while mowing the lawn. Stings to her abdomen and leg; red 1 inch welts noted at sting site on her abdomen. Lower abdominal pain started after the stings; pain is constant. Vitals stable. No shortness of breath. Discussed round ligament pain that may have started when patient was running from the bees however patient does not fee like that is the cause. No contractions palpated or picked up on the monitor. Unable to keep a continuous tracing of the fetus, however heart tones were in the 140s. Dr. Castelan notified, recommended ice, acetaminophen and benadryl. Patient offered all three, would like to take the medications at home as she has them on hand. Declined an ice pack. Feels okay with discharge after knowing that the baby was okay. Reviewed discharge instructions. Patient verbalized understanding and agreement. Ambulated off unit.

## 2023-09-11 NOTE — DISCHARGE INSTRUCTIONS
Dr. Castelan recommends trying benadryl (25mg), acetaminophen (325-650mg) and an ice pack to the bee stings. Please come back in if you feel shortness of breath.

## 2023-10-04 NOTE — PATIENT INSTRUCTIONS
Learning About Screening for Gestational Diabetes  What is gestational diabetes screening?     Screening for gestational diabetes is a way to look for high blood sugar during pregnancy. You drink some very sweet liquid. Then you have a blood test to see how your body uses sugar (glucose).  How is gestational diabetes screening done?  Screening for gestational diabetes may be done in a couple of ways.  Two-part screening.  Part one (glucose challenge test): A blood sample is taken after you drink a liquid that contains sugar (glucose). You don't need to stop eating or drinking before this test. If the test shows that you don't have a lot of sugar in your blood, you don't have gestational diabetes.  Part two (oral glucose tolerance test, or OGTT): If the first test shows a lot of sugar in your blood, then you may have an OGTT. You can't eat or drink for at least 8 hours before this test. A blood sample is taken, then you drink a sweet liquid. You have more blood tests after 1 to 3 hours. If the OGTT shows that you have a lot of sugar in your blood, you may have gestational diabetes.  One-part screening.  Sometimes doctors use the OGTT on its own. If the test shows that you don't have a lot of sugar in your blood, you don't have gestational diabetes. If you do have a lot of sugar in your blood, you may have the condition.  What are the risks of screening?  Your blood glucose level may drop very low toward the end of the test. If this happens, you may feel weak, hungry, and restless. Tell your doctor if you have these symptoms. The test usually will be stopped.  You may vomit after drinking the sweet liquid. If this happens, you may need to take the test at a later time.  Your doctor may do more glucose tests at other times during your pregnancy.  Follow-up care is a key part of your treatment and safety. Be sure to make and go to all appointments, and call your doctor if you are having problems. It's also a good idea  "to know your test results and keep a list of the medicines you take.  Where can you learn more?  Go to https://www.[facility] [facility].net/patiented  Enter A472 in the search box to learn more about \"Learning About Screening for Gestational Diabetes.\"  Current as of: March 1, 2023               Content Version: 13.7    2799-3956 [employer].   Care instructions adapted under license by your healthcare professional. If you have questions about a medical condition or this instruction, always ask your healthcare professional. [employer] disclaims any warranty or liability for your use of this information.      You have been provided the My Labor and Birth Wishes document.  Please review at home and bring to your next prenatal visit. Bring this sheet to the hospital for your birth. Give copies to your care team members and support person.   Additional copies can be found here:  [email or web address]  "

## 2023-10-05 ENCOUNTER — PRENATAL OFFICE VISIT (OUTPATIENT)
Dept: OBGYN | Facility: CLINIC | Age: 33
End: 2023-10-05
Payer: COMMERCIAL

## 2023-10-05 VITALS
DIASTOLIC BLOOD PRESSURE: 65 MMHG | BODY MASS INDEX: 38.09 KG/M2 | HEIGHT: 63 IN | HEART RATE: 95 BPM | SYSTOLIC BLOOD PRESSURE: 132 MMHG | RESPIRATION RATE: 18 BRPM | TEMPERATURE: 98.3 F | WEIGHT: 215 LBS

## 2023-10-05 DIAGNOSIS — Z86.32 HISTORY OF GESTATIONAL DIABETES: ICD-10-CM

## 2023-10-05 DIAGNOSIS — Z34.82 PRENATAL CARE, SUBSEQUENT PREGNANCY IN SECOND TRIMESTER: Primary | ICD-10-CM

## 2023-10-05 DIAGNOSIS — Z98.891 HISTORY OF CESAREAN SECTION: ICD-10-CM

## 2023-10-05 LAB
ERYTHROCYTE [DISTWIDTH] IN BLOOD BY AUTOMATED COUNT: 12.7 % (ref 10–15)
GLUCOSE 1H P 50 G GLC PO SERPL-MCNC: 105 MG/DL (ref 70–129)
HCT VFR BLD AUTO: 38.5 % (ref 35–47)
HGB BLD-MCNC: 12.7 G/DL (ref 11.7–15.7)
MCH RBC QN AUTO: 29.5 PG (ref 26.5–33)
MCHC RBC AUTO-ENTMCNC: 33 G/DL (ref 31.5–36.5)
MCV RBC AUTO: 89 FL (ref 78–100)
PLATELET # BLD AUTO: 331 10E3/UL (ref 150–450)
RBC # BLD AUTO: 4.31 10E6/UL (ref 3.8–5.2)
WBC # BLD AUTO: 11.9 10E3/UL (ref 4–11)

## 2023-10-05 PROCEDURE — 86780 TREPONEMA PALLIDUM: CPT | Performed by: OBSTETRICS & GYNECOLOGY

## 2023-10-05 PROCEDURE — 86706 HEP B SURFACE ANTIBODY: CPT | Performed by: OBSTETRICS & GYNECOLOGY

## 2023-10-05 PROCEDURE — 85027 COMPLETE CBC AUTOMATED: CPT | Performed by: OBSTETRICS & GYNECOLOGY

## 2023-10-05 PROCEDURE — 99207 PR PRENATAL VISIT: CPT | Performed by: OBSTETRICS & GYNECOLOGY

## 2023-10-05 PROCEDURE — 82950 GLUCOSE TEST: CPT | Performed by: OBSTETRICS & GYNECOLOGY

## 2023-10-05 PROCEDURE — 36415 COLL VENOUS BLD VENIPUNCTURE: CPT | Performed by: OBSTETRICS & GYNECOLOGY

## 2023-10-05 PROCEDURE — 86704 HEP B CORE ANTIBODY TOTAL: CPT | Performed by: OBSTETRICS & GYNECOLOGY

## 2023-10-05 NOTE — NURSING NOTE
"Initial /65 (BP Location: Left arm, Patient Position: Chair, Cuff Size: Adult Regular)   Pulse 95   Temp 98.3  F (36.8  C) (Tympanic)   Resp 18   Ht 1.6 m (5' 3\")   Wt 97.5 kg (215 lb)   LMP 04/07/2023 (Within Weeks)   BMI 38.09 kg/m   Estimated body mass index is 38.09 kg/m  as calculated from the following:    Height as of this encounter: 1.6 m (5' 3\").    Weight as of this encounter: 97.5 kg (215 lb). .    "

## 2023-10-05 NOTE — PROGRESS NOTES
"Patient was given QR code to scan and watch \"Anesthetic options for pain relief during labor\" video. Guadalupe Donald, [facility]    "

## 2023-10-05 NOTE — PROGRESS NOTES
"[facility]  Return OB Note    Subjective:  Denies vaginal bleeding, loss of fluid, or regular contractions. +FM.     Objective:  /65 (BP Location: Left arm, Patient Position: Chair, Cuff Size: Adult Regular)   Pulse 95   Temp 98.3  F (36.8  C) (Tympanic)   Resp 18   Ht 1.6 m (5' 3\")   Wt 97.5 kg (215 lb)   LMP 2023 (Within Weeks)   BMI 38.09 kg/m      See OB flowsheet    Assessment/Plan:   Gloria Rodríguez is a 33 year old  at 26w5d by 10w6d US, here for return OB visit.    -NOB labs nl. Midtrimester obtained today.  -NIPT low risk.   -Anatomy US showed placenta previa, which resolved on subsequent ultrasound.  -H/o CS x2: plan on repeat CS  -H/o gestational diabetes: hgba1c normal at 5.3 on 2023  -Anxiety/depression/PTSD/h/o PPD: mood stable     RTC 2 weeks, labor and FM precautions     Donna Castelan MD  OB/GYN       "

## 2023-10-06 LAB
HBV CORE AB SERPL QL IA: NONREACTIVE
HBV SURFACE AB SERPL IA-ACNC: 21.88 M[IU]/ML
HBV SURFACE AB SERPL IA-ACNC: REACTIVE M[IU]/ML
T PALLIDUM AB SER QL: NONREACTIVE

## 2023-10-25 ENCOUNTER — HOSPITAL ENCOUNTER (OUTPATIENT)
Facility: CLINIC | Age: 33
Discharge: HOME | End: 2023-10-25
Attending: OBSTETRICS & GYNECOLOGY | Admitting: OBSTETRICS & GYNECOLOGY
Payer: COMMERCIAL

## 2023-10-25 VITALS
RESPIRATION RATE: 18 BRPM | DIASTOLIC BLOOD PRESSURE: 66 MMHG | TEMPERATURE: 98.4 F | HEART RATE: 105 BPM | SYSTOLIC BLOOD PRESSURE: 131 MMHG | OXYGEN SATURATION: 98 %

## 2023-10-25 LAB
ALBUMIN UR-MCNC: 30 MG/DL
APPEARANCE UR: ABNORMAL
BILIRUB UR QL STRIP: NEGATIVE
CAOX CRY #/AREA URNS HPF: ABNORMAL /HPF
COLOR UR AUTO: YELLOW
GLUCOSE UR STRIP-MCNC: NEGATIVE MG/DL
HGB UR QL STRIP: NEGATIVE
KETONES UR STRIP-MCNC: 5 MG/DL
LEUKOCYTE ESTERASE UR QL STRIP: NEGATIVE
MUCOUS THREADS #/AREA URNS LPF: PRESENT /LPF
NITRATE UR QL: NEGATIVE
PH UR STRIP: 5 [PH] (ref 5–7)
RBC URINE: 0 /HPF
SP GR UR STRIP: 1.03 (ref 1–1.03)
SQUAMOUS EPITHELIAL: 9 /HPF
UROBILINOGEN UR STRIP-MCNC: NORMAL MG/DL
WBC URINE: 0 /HPF

## 2023-10-25 PROCEDURE — G0463 HOSPITAL OUTPT CLINIC VISIT: HCPCS

## 2023-10-25 PROCEDURE — 999N000104 HC STATISTIC NO CHARGE

## 2023-10-25 PROCEDURE — 81001 URINALYSIS AUTO W/SCOPE: CPT | Performed by: OBSTETRICS & GYNECOLOGY

## 2023-10-25 RX ORDER — LIDOCAINE 40 MG/G
CREAM TOPICAL
Status: DISCONTINUED | OUTPATIENT
Start: 2023-10-25 | End: 2023-10-25 | Stop reason: HOSPADM

## 2023-10-25 ASSESSMENT — ACTIVITIES OF DAILY LIVING (ADL): ADLS_ACUITY_SCORE: 20

## 2023-10-25 NOTE — PATIENT INSTRUCTIONS
Weeks 26 to 30 of Your Pregnancy: Care Instructions  You're starting your last trimester. You'll probably feel your baby moving around more. Your back may ache as your body gets used to your baby's size and length. Take care of yourself, and pay attention to what your body needs.    Talk to your doctor about getting the Tdap shot. It will help protect your  against whooping cough (pertussis). Also ask your doctor about flu and COVID-19 shots if you haven't had them yet. If your blood type is Rh negative, you may be given a shot of Rh immune globulin (such as RhoGAM). It can help prevent problems for your baby.   You may have [place] contractions. They are single or several strong contractions without a pattern. These are practice contractions but not the start of labor.   Be kind to yourself.     Take breaks when you're tired.  Change positions often. Don't sit for too long or stand for too long.  At work, rest during breaks if you can. If you don't get breaks, talk to your doctor about writing a letter to your employer to request them.  Avoid fumes, chemicals, and tobacco smoke.  Be sexual if you want to.     You may be interested in sex, or you may not. Everyone is different.  Sex is okay unless your doctor tells you not to.  Your belly can make it hard to find good positions for sex. [place] and explore.  Watch for signs of  labor.    These signs include:   Menstrual-like cramps. Or you may have pain or pressure in your pelvis that happens in a pattern.  About 6 or more contractions in an hour (even after rest and a glass of water).  A low, dull backache that doesn't go away when you change positions.  An increase or change in vaginal discharge.  Light vaginal bleeding or spotting.  Your water breaking.  Know what to do if you think you are having contractions.     Drink 1 or 2 glasses of water.  Lie down on your left side for at least an hour.  While on your side, feel the top of your  "belly to see if it's tight.  Write down your contractions for an hour. Time how long it is from the start of one contraction to the start of the next.  Call your doctor if you have regular contractions.  Follow-up care is a key part of your treatment and safety. Be sure to make and go to all appointments, and call your doctor if you are having problems. It's also a good idea to know your test results and keep a list of the medicines you take.  Where can you learn more?  Go to https://www.Astech.net/patiented  Enter S999 in the search box to learn more about \"Weeks 26 to 30 of Your Pregnancy: Care Instructions.\"  Current as of: November 9, 2022               Content Version: 13.7    4721-3969 [employer].   Care instructions adapted under license by your healthcare professional. If you have questions about a medical condition or this instruction, always ask your healthcare professional. [employer] disclaims any warranty or liability for your use of this information.      Counting Your Baby's Kicks: Care Instructions  Overview     Counting your baby's kicks is one way your doctor can tell that your baby is healthy. You will probably feel your baby move for the first time between 16 and 22 weeks. The movement may feel like flutters rather than kicks. Your baby may move more at certain times of the day. When you are active, you may notice less kicking than when you are resting. At your prenatal visits, your doctor will ask whether the baby is active.  In your last trimester, your doctor may ask you to count the number of times you feel your baby move.  Follow-up care is a key part of your treatment and safety. Be sure to make and go to all appointments, and call your doctor if you are having problems. It's also a good idea to know your test results and keep a list of the medicines you take.  How do you count fetal kicks?  A common method of checking your baby's movement is to note the " "length of time it takes to count 10 movements (such as kicks, flutters, or rolls).  Pick your baby's most active time of day to count. This may be any time from morning to evening.  If you don't feel 10 movements in an hour, have something to eat or drink and count for another hour. If you don't feel at least 10 movements in the 2-hour period, call your doctor.  Do not use an at-home Doppler heart monitor in place of counting fetal movements.  When should you call for help?   Call your doctor now or seek immediate medical care if:    You feel fewer than 10 movements in a 2-hour period.     You noticed that your baby has stopped moving or is moving less than normal.   Watch closely for changes in your health, and be sure to contact your doctor if you have any problems.  Where can you learn more?  Go to https://www.Exercise the World.Xcalia/patiented  Enter U048 in the search box to learn more about \"Counting Your Baby's Kicks: Care Instructions.\"  Current as of: November 9, 2022               Content Version: 13.7    8271-3714 [employer].   Care instructions adapted under license by your healthcare professional. If you have questions about a medical condition or this instruction, always ask your healthcare professional. [employer] disclaims any warranty or liability for your use of this information.      Using Nitrous Oxide During Labor  What is nitrous oxide?  Nitrous oxide is a mixture of 50% nitrous oxide gas and 50% oxygen that is inhaled through a mask. Nitrous oxide is better known for use in dental offices or before surgery to help patients relax. Most people know of it as \"laughing gas.\"   How do I use it during labor?  You hold your own mask and begin to inhale the gas mixture about 30 seconds before a contraction begins. Breathing before and during a contraction helps the gas work the best right at the peak of the contraction, providing the greatest relief. It may take 3 to 4 contractions " to get used to the rhythm of breathing the nitrous oxide.   Does nitrous oxide have any side effects?  Some women have reported dizziness, feeling sleepy, dry mouth and nausea (feeling sick to your stomach) with use of nitrous oxide. These side effects often decrease over time. Medicine is available to help ease nausea if it is a concern for you.   Is any extra monitoring required for me or my baby?  No. Your monitoring will not need to change just because you are using nitrous oxide.   Can I still be out of bed and use nitrous oxide?  Yes. Women sometimes feel dizzy when using nitrous oxide. For this reason, you will begin using nitrous oxide while in a bed or chair. If you feel okay, you may get up and walk or use a birthing ball or stool. It will be important that you have someone in the room close by for support.   Can I use nitrous oxide and have IV pain medicines at the same time?  No. The combination of narcotics (injectable pain medications) and nitrous oxide can slow your breathing, so it is not safe for them to be used together. You may use nitrous oxide before receiving an epidural or IV pain medication.  If I use nitrous oxide can I still get an epidural?  Yes. You may wish to use nitrous oxide until you are ready for an epidural. Nitrous oxide can be less effective than an epidural in reducing labor pain. If an epidural is part of your birth plan, it is important that you get your epidural while you are still able to sit for safe placement.   Are there reasons I couldn't use nitrous oxide?   Yes. You cannot use nitrous oxide if you:  Cannot hold your own face mask.  Have received a dose of narcotic in the past few hours (your nurse will discuss this with you).  Have a documented B12 deficiency.  Have one of a very few other rare medical conditions.  Can my labor support person help me with my nitrous oxide?  No! Only you can administer nitrous oxide to yourself. Part of the built-in safety of nitrous  oxide is that you hold your own mask. If anyone in the room is found to be handling the nitrous oxide equipment other than you or your nurse, the nitrous oxide will be removed.  Can I use nitrous oxide with other procedures?   Yes. If nitrous oxide is right for you and your provider agrees, it may be used in these situations:  During the repair of a laceration or episiotomy  Manual removal of your placenta  Blood draws  IV placement  For informational purposes only. Not to replace the advice of your health care provider. Copyright   2019 [facility]. All rights reserved. Clinically reviewed by  System Operations Leadership APNL Team. InfoGin [id number] - Rev .

## 2023-10-25 NOTE — PROGRESS NOTES
Data: Patient presented to [facility]place: 10/25/2023  5:26 PM.  Reason for maternal/fetal assessment is cramping this afternoon and vaginal bleeding- reddish-pink with wiping around 1630. No blood on visual exam of the external anatomy. No bleeding since. Patient denies leaking of vaginal fluid/rupture of membranes, nausea, vomiting, headache, visual disturbances, epigastric or RUQ pain, significant edema. Patient reports fetal movement is normal. Patient is a 29w4d .  Prenatal record reviewed. Pregnancy has been complicated by placenta previa that has since resolved. Patient had intercourse this morning. Has not had episodes of bleeding before this.     Vital signs wnl. Support person is present.     Action: Verbal consent for EFM. Triage assessment completed. UA collected.    Response: Patient verbalized agreement with plan. Dr. Tavarez notified of patient's arrival.

## 2023-10-25 NOTE — LETTER
[facility]  [address] [address]  Phone: [phone]  Fax: [phone]    October 25, 2023        Gloria Rodríguez  [address] [address]          To whom it may concern:    RE: Gloria Rodríguez    Patient was seen and treated today at our hospital.  Patient may return to work with the following:  Light duty-unable to lift more than 20 pounds  Bend: Occasionally (1-3 hours)  Squat: Occasionally (1-3 hours)  Lift, carry, push, and pull no more than: 11-20 lbs.   Please allow frequent rest breaks and time to hydrate well.    Please contact me for questions or concerns.      Sincerely,        Aparna Gilman MD on 10/25/2023 at 6:58 PM

## 2023-10-25 NOTE — H&P
Admit H&P      HPI:  Gloria VILLAFANA Marcos, 33 year old,  at 29w4d presents for spotting with wiping and increased cramping at work.    Baby has been active.  She just got back from a trip to [place].  She last had intercourse this morning.  She had a little pink on the toilet paper with wiping.  She has been having a lot of stress at work and feels her problems are work related.  She is not getting breaks and not able to drink water at work.  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  She has never had spotting with a prior pregnancy after intercourse and feels her problems are related to stress.      Past Medical History:   Diagnosis Date    Anxiety     Depression     Gestational diabetes mellitus 2018    NO ACTIVE PROBLEMS     Postpartum depression 2016    PTSD (post-traumatic stress disorder)      Patient Active Problem List   Diagnosis    NO ACTIVE PROBLEMS    CARDIOVASCULAR SCREENING; LDL GOAL LESS THAN 130    Pilonidal cyst    Prenatal care, subsequent pregnancy    History of  section    Placenta previa in second trimester    History of gestational diabetes    Encounter for triage in pregnant patient     Past Surgical History:   Procedure Laterality Date     SECTION      ,2018    PILONIDAL CYST / SINUS EXCISION       Social History     Socioeconomic History    Marital status: Single     Spouse name: Not on file    Number of children: Not on file    Years of education: Not on file    Highest education level: Not on file   Occupational History    Not on file   Tobacco Use    Smoking status: Never    Smokeless tobacco: Never   Vaping Use    Vaping Use: Never used   Substance and Sexual Activity    Alcohol use: No    Drug use: Yes     Comment: medical marijuana- quit with pregnancy    Sexual activity: Yes     Partners: Male     Birth control/protection: None   Other Topics Concern    Parent/sibling w/ CABG, MI or angioplasty before 65F 55M? No     Service Not Asked     Blood Transfusions Not Asked    Caffeine Concern Not Asked    Occupational Exposure Not Asked    Hobby Hazards Not Asked    Sleep Concern Not Asked    Stress Concern Not Asked    Weight Concern Not Asked    Special Diet Not Asked    Back Care Not Asked    Exercise Not Asked    Bike Helmet Yes     Comment: advised to wear     Seat Belt Yes    Self-Exams Yes     Comment: advised to do once a month   Social History Narrative    Not on file     Social Determinants of Health     Financial Resource Strain: Not on file   Food Insecurity: Not on file   Transportation Needs: Not on file   Physical Activity: Not on file   Stress: Not on file   Social Connections: Not on file   Interpersonal Safety: Not on file   Housing Stability: Not on file     Family History   Problem Relation Age of Onset    Depression Mother     Substance Abuse Mother         Alcoholic    Kidney Disease Mother     Bipolar Disorder Mother     Ulcers Mother         stomach    Hyperlipidemia Father     Hypertension Paternal Grandmother     Thrombosis Paternal Grandfather     Thyroid Disease Paternal Grandfather         No current outpatient medications on file.        Allergies:     Allergies   Allergen Reactions    Banana Hives    Fruit Extracts      Bananas, itchy mouth    Ibuprofen Sodium Hives    Oseltamivir      Other reaction(s): Tremors    Sulfa Antibiotics Hives    Other [No Clinical Screening - See Comments] Rash     bleach    Silver Rash    Sodium Hypochlorite Other (See Comments) and Rash        ROS:  See HPI      Physical Exam:  /66 (BP Location: Left arm, Cuff Size: Adult Regular)   Pulse 105   Temp 98.4  F (36.9  C) (Oral)   Resp 18   LMP 04/07/2023 (Within Weeks)   SpO2 98%      General appearance: well-hydrated, A&O x 3, no apparent distress  Neck: Appropriate symmetry, thyroid not enlarged  Lungs: Equal expansion bilaterally, no accessory muscle use  Heart: No heaves or thrills. No peripheral varicosities  Constitutional: See  vitals  Abdomen: Soft, non-tender, gravid  Extremities: neg edema, no calf tenderness  Neuro: CN II-XII grossly intact      FHTs: 130's, positive variability, positive accels, no decels  Farnam: no contractions        Assessment/Plan:  33 year old  at 29w4d   Category 1 FHR tracing  Postcoital bleeding.  Had a low lying placenta that has resolved.  Bleeding stopped currently.  She is wanting a note for work.  Will write lifting restrictions and frequent rest and water breaks.        Aparna Gilman MD on 10/25/2023 at 6:51 PM

## 2023-10-26 NOTE — DISCHARGE INSTRUCTIONS
Discharge Instructions for Undelivered Patients  Birthplace Phone # [phone]    Diet:  * Drink 8 to 12 glasses of liquids (milk, juice, water) every day  * You may eat meals and snacks.    Activity:  * Count fetal kicks every day (see handout).  * Call your doctor or nurse midwife if your baby is moving less than usual.    Call your provider if you notice:  * Swelling in your face or increased swelling in your hands or legs.  * Headaches that are not relieved by Tylenol (acetaminophen).  * Changes in your vision (blurring; seeing spots or stars).  * Nausea (sick to your stomach) and vomiting (throwing up).  * Weight gain of 5 pounds per week.  * Heartburn that doesn't go away.  * Signs of bladder infection: Pain when you urinate (use the toilet), needing to go more often or more urgently.  * The bag of campos (membrane) breaks, or you notice leaking in your underwear.  * Bright red blood in your underwear.  * Abdominal (lower belly) or stomach pain.  * Second (plus) baby: Contractions (tightenings) less than 10 minutes apart and getting stronger.  * Increase or change in vaginal discharge (note the color and amount).    Schedule follow up appointment with [facility] by calling [phone]

## 2023-10-26 NOTE — PROGRESS NOTES
Data: Patient assessed in the [facility]place for vaginal bleeding. No vaginal bleeding observed. Cervical exam deferred. Membranes intact. Contractions are not present. See flowsheets for fetal assessment documentation.     Action: Presumed adequate fetal oxygenation documented. Discharge instructions reviewed. Patient instructed to report change in fetal movement, vaginal leaking of fluid or bleeding, abdominal pain, or any concerns related to the pregnancy to provider/clinic.      Response: Orders to discharge home per Dr. Tavarez. Patient verbalized understanding of education and agreement with plan. Discharged to home at 1900.

## 2023-10-27 ENCOUNTER — PREP FOR PROCEDURE (OUTPATIENT)
Dept: OBGYN | Facility: CLINIC | Age: 33
End: 2023-10-27

## 2023-10-27 ENCOUNTER — PRENATAL OFFICE VISIT (OUTPATIENT)
Dept: OBGYN | Facility: CLINIC | Age: 33
End: 2023-10-27
Payer: COMMERCIAL

## 2023-10-27 VITALS
RESPIRATION RATE: 18 BRPM | BODY MASS INDEX: 38.48 KG/M2 | HEART RATE: 91 BPM | DIASTOLIC BLOOD PRESSURE: 72 MMHG | WEIGHT: 217.2 LBS | HEIGHT: 63 IN | TEMPERATURE: 97 F | SYSTOLIC BLOOD PRESSURE: 125 MMHG

## 2023-10-27 DIAGNOSIS — O34.219 PREVIOUS CESAREAN DELIVERY, ANTEPARTUM CONDITION OR COMPLICATION: Primary | ICD-10-CM

## 2023-10-27 DIAGNOSIS — Z3A.29 29 WEEKS GESTATION OF PREGNANCY: Primary | ICD-10-CM

## 2023-10-27 DIAGNOSIS — Z98.891 HISTORY OF CESAREAN SECTION: ICD-10-CM

## 2023-10-27 PROCEDURE — 99207 PR PRENATAL VISIT: CPT | Performed by: OBSTETRICS & GYNECOLOGY

## 2023-10-27 RX ORDER — MISOPROSTOL 200 UG/1
800 TABLET ORAL
Status: CANCELLED | OUTPATIENT
Start: 2023-10-27

## 2023-10-27 RX ORDER — CARBOPROST TROMETHAMINE 250 UG/ML
250 INJECTION, SOLUTION INTRAMUSCULAR
Status: CANCELLED | OUTPATIENT
Start: 2023-10-27

## 2023-10-27 RX ORDER — METHYLERGONOVINE MALEATE 0.2 MG/ML
200 INJECTION INTRAVENOUS
Status: CANCELLED | OUTPATIENT
Start: 2023-10-27

## 2023-10-27 RX ORDER — OXYTOCIN/0.9 % SODIUM CHLORIDE 30/500 ML
100-340 PLASTIC BAG, INJECTION (ML) INTRAVENOUS CONTINUOUS PRN
Status: CANCELLED | OUTPATIENT
Start: 2023-10-27

## 2023-10-27 RX ORDER — CEFAZOLIN SODIUM 2 G/100ML
2 INJECTION, SOLUTION INTRAVENOUS
Status: CANCELLED | OUTPATIENT
Start: 2023-10-27

## 2023-10-27 RX ORDER — SODIUM CHLORIDE, SODIUM LACTATE, POTASSIUM CHLORIDE, CALCIUM CHLORIDE 600; 310; 30; 20 MG/100ML; MG/100ML; MG/100ML; MG/100ML
INJECTION, SOLUTION INTRAVENOUS CONTINUOUS
Status: CANCELLED | OUTPATIENT
Start: 2023-10-27

## 2023-10-27 RX ORDER — OXYTOCIN 10 [USP'U]/ML
10 INJECTION, SOLUTION INTRAMUSCULAR; INTRAVENOUS
Status: CANCELLED | OUTPATIENT
Start: 2023-10-27

## 2023-10-27 RX ORDER — LIDOCAINE 40 MG/G
CREAM TOPICAL
Status: CANCELLED | OUTPATIENT
Start: 2023-10-27

## 2023-10-27 RX ORDER — ACETAMINOPHEN 325 MG/1
975 TABLET ORAL ONCE
Status: CANCELLED | OUTPATIENT
Start: 2023-10-27 | End: 2023-10-27

## 2023-10-27 RX ORDER — OXYTOCIN/0.9 % SODIUM CHLORIDE 30/500 ML
340 PLASTIC BAG, INJECTION (ML) INTRAVENOUS CONTINUOUS PRN
Status: CANCELLED | OUTPATIENT
Start: 2023-10-27

## 2023-10-27 RX ORDER — TRANEXAMIC ACID 10 MG/ML
1 INJECTION, SOLUTION INTRAVENOUS EVERY 30 MIN PRN
Status: CANCELLED | OUTPATIENT
Start: 2023-10-27

## 2023-10-27 RX ORDER — CEFAZOLIN SODIUM 2 G/100ML
2 INJECTION, SOLUTION INTRAVENOUS SEE ADMIN INSTRUCTIONS
Status: CANCELLED | OUTPATIENT
Start: 2023-10-27

## 2023-10-27 RX ORDER — CITRIC ACID/SODIUM CITRATE 334-500MG
30 SOLUTION, ORAL ORAL
Status: CANCELLED | OUTPATIENT
Start: 2023-10-27

## 2023-10-27 RX ORDER — MISOPROSTOL 200 UG/1
400 TABLET ORAL
Status: CANCELLED | OUTPATIENT
Start: 2023-10-27

## 2023-10-27 NOTE — PROGRESS NOTES
"[facility]    Return OB Note    CC: Return OB     Subjective:  Gloria is a 33 year old  at 29w6d   Denies vaginal bleeding, loss of fluid, or regular contractions. Pos fetal movement. No headache, visual disturbance or RUQ pain.  Complaints today: sciatic pain and groin pain.  Has a belly band, but forgets to wear it.    Objective:  /72 (BP Location: Right arm, Patient Position: Chair, Cuff Size: Adult Regular)   Pulse 91   Temp 97  F (36.1  C) (Tympanic)   Resp 18   Ht 1.6 m (5' 3\")   Wt 98.5 kg (217 lb 3.2 oz)   LMP 2023 (Within Weeks)   BMI 38.48 kg/m      See flowsheet      Assessment/Plan:       33 year old year old  female with IUP at 29w6d  Encounter Diagnoses   Name Primary?    29 weeks gestation of pregnancy Yes    History of  section           -NOB labs nl. Midtrimester labs nl.  -NIPT low risk.   -Anatomy US showed placenta previa, which resolved on subsequent ultrasound.  -third trimester labs nml  -H/o CS x2: plan on repeat CS  -H/o gestational diabetes: hgba1c normal at 5.3 on 2023  -Anxiety/depression/PTSD/h/o PPD: mood stable   -discussed PT referral for sciatic pain.  She will try some home stretches first.  -declined flu shot.  -considering covid vaccine - advised pharmacy or FP visit.  -would like to do Tdap and RSV next visit.  -wants repeat  section, no tubal.  Tentatively scheduled for 24 with Dr. Nunes    Discussed  labor and preeclampsia precautions.  Discussed fetal movement precautions.    RTC 2 weeks    Aparna Gilman MD   "

## 2023-10-27 NOTE — NURSING NOTE
"Initial /72 (BP Location: Right arm, Patient Position: Chair, Cuff Size: Adult Regular)   Pulse 91   Temp 97  F (36.1  C) (Tympanic)   Resp 18   Ht 1.6 m (5' 3\")   Wt 98.5 kg (217 lb 3.2 oz)   LMP 04/07/2023 (Within Weeks)   BMI 38.48 kg/m   Estimated body mass index is 38.48 kg/m  as calculated from the following:    Height as of this encounter: 1.6 m (5' 3\").    Weight as of this encounter: 98.5 kg (217 lb 3.2 oz). .  Mary Ren, SARAH    "

## 2023-10-28 ENCOUNTER — HEALTH MAINTENANCE LETTER (OUTPATIENT)
Age: 33
End: 2023-10-28

## 2023-10-30 ENCOUNTER — TELEPHONE (OUTPATIENT)
Dept: OBGYN | Facility: CLINIC | Age: 33
End: 2023-10-30
Payer: COMMERCIAL

## 2023-10-30 NOTE — TELEPHONE ENCOUNTER
"[id number]  Gloria VILLAFANA Marcos    You are now scheduled for surgery at [facility].  Below are the details for your surgery.  Please read the \"Preparing for Your Surgery\" instructions and let us know if you have any questions.    Type of surgery:  SECTION   Surgeon:  Manju Nunes MD  Location of surgery: [facility] OR    Date of surgery: 24    Time: 7:30am   Arrival Time: 6:00am    Time can change, to be confirmed a couple of days prior by pre-op surgery nurse.    Pre-Op Appt Date: Last OB visit  Post-Op Appt Date:  6 weeks post partum   Time:     Packet sent out: Yes at next appt  Pre-cert/Authorization completed:  TBD by Financial Securing Office.   MA Sterilization/Hysterectomy Acknowledgment Consent signed: Not Applicable    [facility]  [phone]    Fax: [phone]  Same Day Surgery [phone]  Fax: [phone]  [facility] [phone]    "

## 2023-11-09 NOTE — PATIENT INSTRUCTIONS
"Weeks 32 to 34 of Your Pregnancy: Care Instructions    Decide whether you want to bank or donate your baby's umbilical cord blood. If you want to save this blood, you have to arrange for it ahead of time.   Decide about circumcision. Personal, Jew, or cultural beliefs may play a role in your decision. You get to decide what you want for your baby.     Learn how to ease hemorrhoids.    Get more liquids, fruits, vegetables, and fiber in your diet.  Avoid sitting for too long.  Clean yourself with moist toilet paper. Or try witch hazel pads.  Try ice packs or warm sitz baths for discomfort.  Use hydrocortisone cream for pain or itching.  Ask your doctor about stool softeners.    Consider the benefits of breastfeeding.    It reduces your baby's risk of sudden infant death syndrome (SIDS).   babies are less likely to get certain infections. And they're less likely to be obese or get diabetes later in life.  It can lower your risk of breast and ovarian cancers and osteoporosis.  It saves you money.  Follow-up care is a key part of your treatment and safety. Be sure to make and go to all appointments, and call your doctor if you are having problems. It's also a good idea to know your test results and keep a list of the medicines you take.  Where can you learn more?  Go to https://www.Localler.net/patiented  Enter X711 in the search box to learn more about \"Weeks 32 to 34 of Your Pregnancy: Care Instructions.\"  Current as of: July 11, 2023               Content Version: 13.8    9395-0504 [employer].   Care instructions adapted under license by your healthcare professional. If you have questions about a medical condition or this instruction, always ask your healthcare professional. [employer] disclaims any warranty or liability for your use of this information.      You have been provided the Any Day Now: Early Labor at Home document.    Additional copies can be found here:  " [email or web address].[email or web address].pdf  You have been provided the What I'd Wish I'd Known About Giving Birth document.    Additional copies can be found here:  [email or web address].pdf

## 2023-11-10 ENCOUNTER — PRENATAL OFFICE VISIT (OUTPATIENT)
Dept: OBGYN | Facility: CLINIC | Age: 33
End: 2023-11-10
Payer: COMMERCIAL

## 2023-11-10 VITALS
HEIGHT: 63 IN | WEIGHT: 213 LBS | RESPIRATION RATE: 18 BRPM | DIASTOLIC BLOOD PRESSURE: 73 MMHG | BODY MASS INDEX: 37.74 KG/M2 | SYSTOLIC BLOOD PRESSURE: 127 MMHG | HEART RATE: 110 BPM | TEMPERATURE: 97.6 F

## 2023-11-10 DIAGNOSIS — F41.9 ANXIETY: ICD-10-CM

## 2023-11-10 DIAGNOSIS — Z98.891 HISTORY OF CESAREAN SECTION: ICD-10-CM

## 2023-11-10 DIAGNOSIS — Z34.83 PRENATAL CARE, SUBSEQUENT PREGNANCY IN THIRD TRIMESTER: Primary | ICD-10-CM

## 2023-11-10 PROCEDURE — 99207 PR PRENATAL VISIT: CPT | Performed by: STUDENT IN AN ORGANIZED HEALTH CARE EDUCATION/TRAINING PROGRAM

## 2023-11-10 NOTE — NURSING NOTE
"Initial /73 (BP Location: Right arm, Patient Position: Chair, Cuff Size: Adult Regular)   Pulse 110   Temp 97.6  F (36.4  C) (Tympanic)   Resp 18   Ht 1.6 m (5' 3\")   Wt 96.6 kg (213 lb)   LMP 04/07/2023 (Within Weeks)   BMI 37.73 kg/m   Estimated body mass index is 37.73 kg/m  as calculated from the following:    Height as of this encounter: 1.6 m (5' 3\").    Weight as of this encounter: 96.6 kg (213 lb). .    "

## 2023-11-10 NOTE — PROGRESS NOTES
"[facility]  Return OB Note    Subjective:  Denies vaginal bleeding, loss of fluid, or regular contractions. Noted normal fetal movement.  Complaints today: none    Objective:  /73 (BP Location: Right arm, Patient Position: Chair, Cuff Size: Adult Regular)   Pulse 110   Temp 97.6  F (36.4  C) (Tympanic)   Resp 18   Ht 1.6 m (5' 3\")   Wt 96.6 kg (213 lb)   LMP 2023 (Within Weeks)   BMI 37.73 kg/m      Fundal height: 32cm  FHT: 135bpm    Assessment/Plan:   Gloria Rodríguez is a 33 year old  at 31w6d by 10w6d, here for return OB visit.    -NOB labs nl. Midtrimester labs nl.  -NIPT low risk.   -Anatomy US showed placenta previa, which resolved on subsequent ultrasound.  -Deferred Tdap vaccine till next visit due to ongoing infection. RSV vaccine discussion today and brochure given today. Declined flu shot. Considering covid vaccine - advised pharmacy or FP visit.    -H/o CS x2: plan on repeat CS WITHOUT tubal ligation, which is tentatively scheduled for 1/2  -H/o gestational diabetes: hgba1c normal at 5.3 on 2023  -Anxiety/depression/PTSD/h/o PPD: mood stable without medications.  -discussed PT referral for sciatic pain.  She will try some home stretches first.    RTC 2 weeks    Manju Nunes MD  Obstetrics and Gynecology  [facility]   11/10/2023         "

## 2023-11-21 NOTE — PATIENT INSTRUCTIONS
"Weeks 32 to 34 of Your Pregnancy: Care Instructions    Decide whether you want to bank or donate your baby's umbilical cord blood. If you want to save this blood, you have to arrange for it ahead of time.   Decide about circumcision. Personal, Worship, or cultural beliefs may play a role in your decision. You get to decide what you want for your baby.     Learn how to ease hemorrhoids.    Get more liquids, fruits, vegetables, and fiber in your diet.  Avoid sitting for too long.  Clean yourself with moist toilet paper. Or try witch hazel pads.  Try ice packs or warm sitz baths for discomfort.  Use hydrocortisone cream for pain or itching.  Ask your doctor about stool softeners.    Consider the benefits of breastfeeding.    It reduces your baby's risk of sudden infant death syndrome (SIDS).   babies are less likely to get certain infections. And they're less likely to be obese or get diabetes later in life.  It can lower your risk of breast and ovarian cancers and osteoporosis.  It saves you money.  Follow-up care is a key part of your treatment and safety. Be sure to make and go to all appointments, and call your doctor if you are having problems. It's also a good idea to know your test results and keep a list of the medicines you take.  Where can you learn more?  Go to https://www.[employer].net/patiented  Enter X711 in the search box to learn more about \"Weeks 32 to 34 of Your Pregnancy: Care Instructions.\"  Current as of: July 11, 2023               Content Version: 13.8    9253-2738 [employer].   Care instructions adapted under license by your healthcare professional. If you have questions about a medical condition or this instruction, always ask your healthcare professional. [employer] disclaims any warranty or liability for your use of this information.      You have been provided the Any Day Now: Early Labor at Home document.    Additional copies can be found here:  " [email or web address]  You have been provided the What I'd Wish I'd Known About Giving Birth document.    Additional copies can be found here:  [email or web address]

## 2023-11-22 ENCOUNTER — PRENATAL OFFICE VISIT (OUTPATIENT)
Dept: OBGYN | Facility: CLINIC | Age: 33
End: 2023-11-22
Payer: COMMERCIAL

## 2023-11-22 VITALS
WEIGHT: 214.8 LBS | BODY MASS INDEX: 38.06 KG/M2 | RESPIRATION RATE: 14 BRPM | DIASTOLIC BLOOD PRESSURE: 69 MMHG | SYSTOLIC BLOOD PRESSURE: 125 MMHG | HEART RATE: 97 BPM | TEMPERATURE: 97.5 F | HEIGHT: 63 IN

## 2023-11-22 DIAGNOSIS — O26.13 POOR WEIGHT GAIN OF PREGNANCY, THIRD TRIMESTER: ICD-10-CM

## 2023-11-22 DIAGNOSIS — Z23 ENCOUNTER FOR ADMINISTRATION OF VACCINE: Primary | ICD-10-CM

## 2023-11-22 DIAGNOSIS — Z34.83 PRENATAL CARE, SUBSEQUENT PREGNANCY IN THIRD TRIMESTER: ICD-10-CM

## 2023-11-22 PROCEDURE — 99207 PR PRENATAL VISIT: CPT | Performed by: PHYSICIAN ASSISTANT

## 2023-11-22 PROCEDURE — 90715 TDAP VACCINE 7 YRS/> IM: CPT | Performed by: PHYSICIAN ASSISTANT

## 2023-11-22 PROCEDURE — 90678 RSV VACC PREF BIVALENT IM: CPT | Performed by: PHYSICIAN ASSISTANT

## 2023-11-22 PROCEDURE — 90472 IMMUNIZATION ADMIN EACH ADD: CPT | Performed by: PHYSICIAN ASSISTANT

## 2023-11-22 PROCEDURE — 90471 IMMUNIZATION ADMIN: CPT | Performed by: PHYSICIAN ASSISTANT

## 2023-11-22 NOTE — NURSING NOTE
"Initial /69 (BP Location: Left arm, Patient Position: Sitting, Cuff Size: Adult Regular)   Pulse 97   Temp 97.5  F (36.4  C) (Tympanic)   Resp 14   Ht 1.6 m (5' 3\")   Wt 97.4 kg (214 lb 12.8 oz)   LMP 04/07/2023 (Within Weeks)   BMI 38.05 kg/m   Estimated body mass index is 38.05 kg/m  as calculated from the following:    Height as of this encounter: 1.6 m (5' 3\").    Weight as of this encounter: 97.4 kg (214 lb 12.8 oz). .    "

## 2023-11-22 NOTE — PROGRESS NOTES
Prior to immunization administration, verified patients identity using patient s name and date of birth. Please see Immunization Activity for additional information.     Screening Questionnaire for Adult Immunization    Are you sick today?   No   Do you have allergies to medications, food, a vaccine component or latex?   No   Have you ever had a serious reaction after receiving a vaccination?   No   Do you have a long-term health problem with heart, lung, kidney, or metabolic disease (e.g., diabetes), asthma, a blood disorder, no spleen, complement component deficiency, a cochlear implant, or a spinal fluid leak?  Are you on long-term aspirin therapy?   No   Do you have cancer, leukemia, HIV/AIDS, or any other immune system problem?   No   Do you have a parent, brother, or sister with an immune system problem?   No   In the past 3 months, have you taken medications that affect  your immune system, such as prednisone, other steroids, or anticancer drugs; drugs for the treatment of rheumatoid arthritis, Crohn s disease, or psoriasis; or have you had radiation treatments?   No   Have you had a seizure, or a brain or other nervous system problem?   No   During the past year, have you received a transfusion of blood or blood    products, or been given immune (gamma) globulin or antiviral drug?   No   For women: Are you pregnant or is there a chance you could become       pregnant during the next month?   yES   Have you received any vaccinations in the past 4 weeks?   No           Patient instructed to remain in clinic for 15 minutes afterwards, and to report any adverse reactions.     Screening performed by Douglas Betancur MA on 11/22/2023 at 9:57 AM.

## 2023-11-22 NOTE — PROGRESS NOTES
"[facility]  Return OB Note     Subjective:  Denies vaginal bleeding, loss of fluid, or regular contractions. Noted normal fetal movement.  Complaints today: concerned that she is not gaining weight in the 3rd trimester.      Objective:  /69 (BP Location: Left arm, Patient Position: Sitting, Cuff Size: Adult Regular)   Pulse 97   Temp 97.5  F (36.4  C) (Tympanic)   Resp 14   Ht 1.6 m (5' 3\")   Wt 97.4 kg (214 lb 12.8 oz)   LMP 2023 (Within Weeks)   BMI 38.05 kg/m       Fundal height: 34 cm  FHT: 160 bpm     Assessment/Plan:   Gloria Rodríguez is a 33 year old  at 33w4d by 10w6d, here for return OB visit.     -NOB labs nl. Midtrimester labs nl.  -NIPT low risk.   -Anatomy US showed placenta previa, which resolved on subsequent ultrasound.  -Tdap and RSV vaccines given today. Declined flu shot. Considering covid vaccine - advised pharmacy or FP visit.     -H/o CS x2: plan on repeat CS WITHOUT tubal ligation, which is tentatively scheduled for 1/2  -H/o gestational diabetes: hgba1c normal at 5.3 on 2023  -1 hour glucose test within normal limits.   -Anxiety/depression/PTSD/h/o PPD: mood stable without medications.  -discussed PT referral for sciatic pain.  She will try some home stretches first.  -due to patient's concern about not gaining weight in 3rd trimester growth US order placed.        RTC 2 weeks     Opal Lyles PA-C   "

## 2023-11-22 NOTE — PROGRESS NOTES
"[facility]   Return OB Note     Subjective:  Denies vaginal bleeding, loss of fluid, or regular contractions. Noted normal fetal movement.  Complaints today: none     Objective:  /69 (BP Location: Left arm, Patient Position: Sitting, Cuff Size: Adult Regular)   Pulse 97   Temp 97.5  F (36.4  C) (Tympanic)   Resp 14   Ht 1.6 m (5' 3\")   Wt 97.4 kg (214 lb 12.8 oz)   LMP 2023 (Within Weeks)   BMI 38.05 kg/m      Fundal height: 32cm  FHT: 135bpm     Assessment/Plan:   Gloria Rodríguez is a 33 year old  at 33w6d by 10w6d, here for return OB visit.     -NOB labs nl. Midtrimester labs nl.  -NIPT low risk.   -Anatomy US showed placenta previa, which resolved on subsequent ultrasound.  -Deferred Tdap vaccine till next visit due to ongoing infection. RSV vaccine discussion today and brochure given today. Declined flu shot. Considering covid vaccine - advised pharmacy or FP visit.     -H/o CS x2: plan on repeat CS WITHOUT tubal ligation, which is tentatively scheduled for 1/2  -H/o gestational diabetes: hgba1c normal at 5.3 on 2023  -Anxiety/depression/PTSD/h/o PPD: mood stable without medications.  -discussed PT referral for sciatic pain.  She will try some home stretches first.     RTC 2 weeks  "

## 2023-11-29 ENCOUNTER — HOSPITAL ENCOUNTER (OUTPATIENT)
Dept: ULTRASOUND IMAGING | Facility: CLINIC | Age: 33
Discharge: HOME | End: 2023-11-29
Attending: PHYSICIAN ASSISTANT | Admitting: PHYSICIAN ASSISTANT
Payer: COMMERCIAL

## 2023-11-29 DIAGNOSIS — Z34.83 PRENATAL CARE, SUBSEQUENT PREGNANCY IN THIRD TRIMESTER: ICD-10-CM

## 2023-11-29 PROCEDURE — 76816 OB US FOLLOW-UP PER FETUS: CPT

## 2023-12-06 ENCOUNTER — PRENATAL OFFICE VISIT (OUTPATIENT)
Dept: OBGYN | Facility: CLINIC | Age: 33
End: 2023-12-06
Payer: COMMERCIAL

## 2023-12-06 VITALS
DIASTOLIC BLOOD PRESSURE: 71 MMHG | SYSTOLIC BLOOD PRESSURE: 123 MMHG | HEART RATE: 114 BPM | BODY MASS INDEX: 38.62 KG/M2 | HEIGHT: 63 IN | WEIGHT: 218 LBS | RESPIRATION RATE: 18 BRPM | TEMPERATURE: 98.4 F

## 2023-12-06 DIAGNOSIS — Z34.83 PRENATAL CARE, SUBSEQUENT PREGNANCY IN THIRD TRIMESTER: Primary | ICD-10-CM

## 2023-12-06 PROCEDURE — 99207 PR PRENATAL VISIT: CPT | Performed by: PHYSICIAN ASSISTANT

## 2023-12-06 NOTE — NURSING NOTE
"Initial /71 (BP Location: Right arm, Patient Position: Chair, Cuff Size: Adult Regular)   Pulse 114   Temp 98.4  F (36.9  C) (Tympanic)   Resp 18   Ht 1.6 m (5' 3\")   Wt 98.9 kg (218 lb)   LMP 04/07/2023 (Within Weeks)   BMI 38.62 kg/m   Estimated body mass index is 38.62 kg/m  as calculated from the following:    Height as of this encounter: 1.6 m (5' 3\").    Weight as of this encounter: 98.9 kg (218 lb). .    "

## 2023-12-06 NOTE — PROGRESS NOTES
"[facility]  Return OB Note     Subjective:  Denies vaginal bleeding, loss of fluid, or regular contractions. Noted normal fetal movement. No headaches, vision changes, RUQ abdominal pain.   Complaints today: some leg swelling as the day goes on and itching feet because her feet are sweating more. Leg swelling resolves after elevating legs. No rash or consistent itching on feet. Discussed if symptoms worsen to let us know.      Objective:  /71 (BP Location: Right arm, Patient Position: Chair, Cuff Size: Adult Regular)   Pulse 114   Temp 98.4  F (36.9  C) (Tympanic)   Resp 18   Ht 1.6 m (5' 3\")   Wt 98.9 kg (218 lb)   LMP 2023 (Within Weeks)   BMI 38.62 kg/m         Fundal height: 36 cm   FHT: 145 bpm      Assessment/Plan:   Gloria Rodríguez is a 33 year old  at 35w4d by 10w6d, here for return OB visit.     -NOB labs nl. Midtrimester labs nl.  -NIPT low risk.   -Anatomy US showed placenta previa, which resolved on subsequent ultrasound.  -Tdap and RSV vaccines given today. Declined flu shot. Considering covid vaccine - advised pharmacy or FP visit.     -H/o CS x2: plan on repeat CS WITHOUT tubal ligation, which is tentatively scheduled for 1/2  -H/o gestational diabetes: hgba1c normal at 5.3 on 2023  -1 hour glucose test within normal limits.   -Anxiety/depression/PTSD/h/o PPD: mood stable without medications.  -discussed PT referral for sciatic pain.  She will try some home stretches first.  -due to patient's concern about not gaining weight in 3rd trimester growth US done: with normal interval growth  -GBS swab next visit      RTC 1 weeks      Opal Lyles PA-C   "

## 2023-12-13 ENCOUNTER — PRENATAL OFFICE VISIT (OUTPATIENT)
Dept: OBGYN | Facility: CLINIC | Age: 33
End: 2023-12-13
Payer: COMMERCIAL

## 2023-12-13 VITALS
HEART RATE: 111 BPM | WEIGHT: 218 LBS | TEMPERATURE: 97.1 F | RESPIRATION RATE: 18 BRPM | HEIGHT: 63 IN | DIASTOLIC BLOOD PRESSURE: 79 MMHG | SYSTOLIC BLOOD PRESSURE: 125 MMHG | BODY MASS INDEX: 38.62 KG/M2

## 2023-12-13 DIAGNOSIS — E66.812 CLASS 2 OBESITY WITHOUT SERIOUS COMORBIDITY WITH BODY MASS INDEX (BMI) OF 38.0 TO 38.9 IN ADULT, UNSPECIFIED OBESITY TYPE: ICD-10-CM

## 2023-12-13 DIAGNOSIS — F41.9 ANXIETY: ICD-10-CM

## 2023-12-13 DIAGNOSIS — Z34.83 PRENATAL CARE, SUBSEQUENT PREGNANCY IN THIRD TRIMESTER: Primary | ICD-10-CM

## 2023-12-13 DIAGNOSIS — Z98.891 HISTORY OF CESAREAN SECTION: ICD-10-CM

## 2023-12-13 PROCEDURE — 99207 PR PRENATAL VISIT: CPT | Performed by: STUDENT IN AN ORGANIZED HEALTH CARE EDUCATION/TRAINING PROGRAM

## 2023-12-13 PROCEDURE — 87653 STREP B DNA AMP PROBE: CPT | Performed by: STUDENT IN AN ORGANIZED HEALTH CARE EDUCATION/TRAINING PROGRAM

## 2023-12-13 RX ORDER — BREAST PUMP
1 EACH MISCELLANEOUS SEE ADMIN INSTRUCTIONS
Qty: 1 EACH | Refills: 0 | Status: ON HOLD | OUTPATIENT
Start: 2023-12-13 | End: 2024-01-03

## 2023-12-13 NOTE — PROGRESS NOTES
"[facility]  Return OB Note    Subjective:  Denies vaginal bleeding, loss of fluid, or regular contractions. Noted normal fetal movement.  Complaints today: increased vaginal discharge     Objective:  /79 (BP Location: Right arm, Patient Position: Chair, Cuff Size: Adult Regular)   Pulse 111   Temp 97.1  F (36.2  C) (Tympanic)   Resp 18   Ht 1.6 m (5' 3\")   Wt 98.9 kg (218 lb)   LMP 2023 (Within Weeks)   BMI 38.62 kg/m      Fundal height: 38cm  FHT: 135 bpm    Assessment/Plan:   Gloria Rodríguez is a 33 year old  at 36w4d by 10w6d, here for return OB visit.     -NOB labs nl. Midtrimester labs nl. GBS obtained today  -NIPT low risk.   -Anatomy US showed placenta previa, which resolved on subsequent ultrasound.  -S/p Tdap and RSV vaccine.     -H/o CS x2: plan on repeat CS WITHOUT tubal ligation, which is tentatively scheduled for 1/2  -Anxiety/depression/PTSD/h/o PPD: mood stable without medications.  -Sciatic pain: not interested in PT at this time.  -GERD: controlled with small frequent meals and milk prior to sleeping.  -Breast pump prescription given today    RTC 1 weeks    Manju Nunes MD  Obstetrics and Gynecology  [facility]   2023         "

## 2023-12-13 NOTE — NURSING NOTE
"Initial /79 (BP Location: Right arm, Patient Position: Chair, Cuff Size: Adult Regular)   Pulse 111   Temp 97.1  F (36.2  C) (Tympanic)   Resp 18   Ht 1.6 m (5' 3\")   Wt 98.9 kg (218 lb)   LMP 04/07/2023 (Within Weeks)   BMI 38.62 kg/m   Estimated body mass index is 38.62 kg/m  as calculated from the following:    Height as of this encounter: 1.6 m (5' 3\").    Weight as of this encounter: 98.9 kg (218 lb). .    "

## 2023-12-14 LAB — GP B STREP DNA SPEC QL NAA+PROBE: NEGATIVE

## 2023-12-20 ENCOUNTER — PRENATAL OFFICE VISIT (OUTPATIENT)
Dept: OBGYN | Facility: CLINIC | Age: 33
End: 2023-12-20
Payer: MEDICAID

## 2023-12-20 VITALS
HEIGHT: 63 IN | HEART RATE: 105 BPM | RESPIRATION RATE: 18 BRPM | TEMPERATURE: 98.2 F | BODY MASS INDEX: 38.62 KG/M2 | SYSTOLIC BLOOD PRESSURE: 128 MMHG | DIASTOLIC BLOOD PRESSURE: 69 MMHG | WEIGHT: 218 LBS

## 2023-12-20 DIAGNOSIS — Z98.891 HISTORY OF CESAREAN SECTION: ICD-10-CM

## 2023-12-20 DIAGNOSIS — Z34.83 PRENATAL CARE, SUBSEQUENT PREGNANCY IN THIRD TRIMESTER: Primary | ICD-10-CM

## 2023-12-20 PROCEDURE — 99207 PR PRENATAL VISIT: CPT | Performed by: PHYSICIAN ASSISTANT

## 2023-12-20 NOTE — NURSING NOTE
"Initial /69 (BP Location: Left arm, Patient Position: Chair, Cuff Size: Adult Regular)   Pulse 105   Temp 98.2  F (36.8  C) (Tympanic)   Resp 18   Ht 1.6 m (5' 3\")   Wt 98.9 kg (218 lb)   LMP 04/07/2023 (Within Weeks)   BMI 38.62 kg/m   Estimated body mass index is 38.62 kg/m  as calculated from the following:    Height as of this encounter: 1.6 m (5' 3\").    Weight as of this encounter: 98.9 kg (218 lb). .    "

## 2023-12-20 NOTE — PROGRESS NOTES
"[facility]  Return OB Note     Subjective:  Denies vaginal bleeding, loss of fluid, or regular contractions. Noted normal fetal movement.  Complaints today: none     Objective:  /69 (BP Location: Left arm, Patient Position: Chair, Cuff Size: Adult Regular)   Pulse 105   Temp 98.2  F (36.8  C) (Tympanic)   Resp 18   Ht 1.6 m (5' 3\")   Wt 98.9 kg (218 lb)   LMP 2023 (Within Weeks)   BMI 38.62 kg/m         Fundal height: 38cm  FHT: 150 bpm     Assessment/Plan:   Gloria Rodríguez is a 33 year old  at 37w4d by 10w6d, here for return OB visit.     -NOB labs nl. Midtrimester labs nl. GBS obtained today  -NIPT low risk.   -Anatomy US showed placenta previa, which resolved on subsequent ultrasound.  -S/p Tdap and RSV vaccine.     -H/o CS x2: plan on repeat CS WITHOUT tubal ligation, which is tentatively scheduled for 1/2  -Anxiety/depression/PTSD/h/o PPD: mood stable without medications.  -Sciatic pain: not interested in PT at this time.  -GERD: controlled with small frequent meals and milk prior to sleeping.  -Breast pump prescription given at previous visit.   -GBS negative     RTC 1 week    Opal Lyles PA-C      "

## 2023-12-20 NOTE — PATIENT INSTRUCTIONS
"Week 37 of Your Pregnancy: Care Instructions    Most babies are born between 37 and 40 weeks.   This is a good time to pack a bag to take with you to the birth. Then it will be ready to go when you are.     Learn about breastfeeding.  For example, find out about ways to hold your baby to make breastfeeding easier. And think about learning how to pump and store milk.     Know that crying is normal.  It's common for babies to cry 1 to 3 hours a day. Some cry more, and some cry less.     Learn why babies cry.  They may be hungry; have gas; need a diaper change; or feel cold, warm, tired, lonely, or tense. Sometimes they cry for unknown reasons.     Think about what will help you stay calm when your baby cries.  Taking slow, deep breaths can help. So can taking a break. It's okay to put your baby somewhere safe (like their crib) and walk away for a few minutes.     Learn about safe sleep for your baby.  Always put your baby to sleep on their back. Place them alone in a crib or bassinet with a firm, flat surface. Keep soft items like stuffed animals out of the crib.     Learn what to expect with  poop.  Your baby will have their own bowel patterns. Some babies have several bowel movements a day. Some have fewer.     Know that  babies will often have loose, yellow bowel movements.  Formula-fed babies have more formed stools. If your baby's poop looks like pellets, your baby is constipated.   Follow-up care is a key part of your treatment and safety. Be sure to make and go to all appointments, and call your doctor if you are having problems. It's also a good idea to know your test results and keep a list of the medicines you take.  Where can you learn more?  Go to https://www.[employer].net/patiented  Enter N257 in the search box to learn more about \"Week 37 of Your Pregnancy: Care Instructions.\"  Current as of: 2023               Content Version: 13.8    4617-2058 [employer].   Care " instructions adapted under license by your healthcare professional. If you have questions about a medical condition or this instruction, always ask your healthcare professional. [employer] disclaims any warranty or liability for your use of this information.      Week 38 of Your Pregnancy: Care Instructions  Believe it or not, your baby is almost here. You may notice how your baby responds to sounds, warmth, cold, and light. You may even know what kind of music your baby likes.    Even if you expect a vaginal birth, it's a good idea to learn about  section ().  is the delivery of a baby through a cut (incision) in your belly. It's a major surgery, so it has more risks than a vaginal birth.   During the first 2 weeks after the birth, limit visitors. Don't allow visitors who have colds or infections. Ask visitors to wash their hands before they touch your baby. And never let anyone smoke around your baby.     Know about unplanned C-sections.  Reasons for an unplanned  include labor that slows or stops, signs of distress in your baby, and high blood pressure or other problems for you.     Know about planned C-sections.  If your baby isn't in a head-down position for delivery (breech position), your doctor may plan a . Or you may have a planned  if you're having twins or more.     Get as much help as you can while you're in the hospital.  You can learn about feeding, diapering, and bathing your baby.     Talk to your doctor or midwife about how to care for the umbilical cord stump.  If your baby will be circumcised, also ask about how to care for that.     Ask friends or family for help, as you need it.  If you can, have another adult in your home for at least 2 or 3 days after the birth.     Try to nap when your baby naps.  This may be your best chance to get some sleep.     Watch for changes in your mental health.  For the first 1 to 2 weeks after  "birth, it's common to cry or feel sad or irritable. If these feelings last for more than 2 weeks, you may have postpartum depression. Ask your doctor for help. Postpartum depression can be treated.   Follow-up care is a key part of your treatment and safety. Be sure to make and go to all appointments, and call your doctor if you are having problems. It's also a good idea to know your test results and keep a list of the medicines you take.  Where can you learn more?  Go to https://www.[employer].net/patiented  Enter B044 in the search box to learn more about \"Week 38 of Your Pregnancy: Care Instructions.\"  Current as of: 2023               Content Version: 13.8    0032-6345 [employer].   Care instructions adapted under license by your healthcare professional. If you have questions about a medical condition or this instruction, always ask your healthcare professional. [employer] disclaims any warranty or liability for your use of this information.      Week 39 of Your Pregnancy: Care Instructions    Suches babies can look different than what you see in pictures or movies. Their heads can be a strange shape right after birth. And they may have swollen eyes and red marks on their faces.   You can still get pregnant even if you are breastfeeding. If you don't want to get pregnant, talk to your doctor about birth control.   Tips for week 39 of pregnancy  If you plan to breastfeed, get prepared.     Continue to eat healthy foods.  Keep taking your prenatal vitamins during breastfeeding if your doctor recommends it.  Talk to your doctor before taking any medicines or supplements.  Choose the right birth control for you.     Intrauterine devices (IUDs) are placed in the uterus. Sometimes the IUD can be placed right after giving birth. They work for years.  Hormonal implants are placed under the skin of the arm. They also work for years.  Depo-Provera is a shot. You get it every 3 " "months.  Birth control pills can be used. They're taken every day.  Tubal ligation (tying your tubes) and vasectomy are surgeries. They're permanent.  Diaphragms, spermicide, and condoms must be used each time you have sex. If you used a diaphragm before, you should get refitted after the baby is born.  A birth control patch or ring can be used. These just can't be started until several weeks after you give birth.  Follow-up care is a key part of your treatment and safety. Be sure to make and go to all appointments, and call your doctor if you are having problems. It's also a good idea to know your test results and keep a list of the medicines you take.  Where can you learn more?  Go to https://[email or web address].[email or web address]/patiented  Enter A811 in the search box to learn more about \"Week 39 of Your Pregnancy: Care Instructions.\"  Current as of: 2023               Content Version: 13.8    4346-8546 [employer].   Care instructions adapted under license by your healthcare professional. If you have questions about a medical condition or this instruction, always ask your healthcare professional. [employer] disclaims any warranty or liability for your use of this information.      Weeks 40 to 41 of Your Pregnancy: Care Instructions  By week 40, you've reached your due date. Your baby could be coming any day. Most babies born after their due dates are healthy. But you may have tests to make sure everything is okay. If you feel stressed, you could try a meditation exercise, such as guided imagery. It may help you relax.    If you are past 41 weeks, your doctor may measure the amount of fluid that surrounds your baby. They may also test your baby's movement and heart rate.   If you don't start labor on your own by 41 or 42 weeks, your doctor may want to start (induce) labor. If there are other concerns, your doctor may talk to you about a .   To start (induce) your labor, your doctor may " "do any of these things.    Place a narrow tube with a small balloon on the end (balloon catheter) into your cervix.  The doctor inflates the balloon. This helps your cervix open (dilate).     Sweep the membranes (separate the lining of the amniotic sac from the uterus).  This helps the uterus make a chemical that can start contractions.     \"Break your water\" (rupture the amniotic sac).  This may be done if your cervix has started to open.     Use medicines.  They may be used to soften the cervix or start contractions.   How to do guided imagery    Close your eyes, and take a few deep breaths. Picture a peaceful setting, such as a beach or a meadow. Add a winding path. Follow the path until you feel more and more relaxed.   Take a few minutes to breathe slowly and feel the calm. When you are ready, slowly take yourself back to the present. Count to 3, and open your eyes.   Follow-up care is a key part of your treatment and safety. Be sure to make and go to all appointments, and call your doctor if you are having problems. It's also a good idea to know your test results and keep a list of the medicines you take.  Where can you learn more?  Go to https://www.[employer].net/patiented  Enter T922 in the search box to learn more about \"Weeks 40 to 41 of Your Pregnancy: Care Instructions.\"  Current as of: July 11, 2023               Content Version: 13.8    8373-3743 [employer].   Care instructions adapted under license by your healthcare professional. If you have questions about a medical condition or this instruction, always ask your healthcare professional. [employer] disclaims any warranty or liability for your use of this information.      Labor Induction  What You Need to Know  What is labor induction?  In most cases, it is best to go into labor naturally. When labor does not start on its own, we may use medicine or other methods to start (induce) labor. This is called induction, " "which:  Helps the uterus contract  Thins, softens and opens the cervix (opening of the uterus)  When is induction used?  There are two types of induction.  Medical induction may be done to protect the health of the parent or baby if:  There are medical concerns for you or your baby.  You haven't had your baby by 41 weeks.  Induction for non-medical reasons may be done at 39 weeks or later if:  You live far from the hospital.  You've been having contractions for many days.  You've given birth quickly in the past.   We will not perform an induction for non-medical reasons before 39 weeks. Studies show that babies born before 39 weeks may struggle with breathing, feeding, sleeping and staying warm. They are more likely to have health problems and may need to stay in the hospital longer. If we start your labor for medical reasons, the benefits will outweigh these risks. We will talk to you about your risks, benefits and alternatives (other options) before we start your labor.  How is induction done?  We may start your labor by doing one or more of the following:  We may need to help your cervix soften and open (sometimes called \"ripening\") to prepare it for labor. There are two ways to do this:  Medicines--these may be in the form of pills that you swallow or medicines that are put into the vagina next to the cervix.  Mechanical--using either a single or double balloon. These are small balloons that are attached to tubes that go up inside the cervix. The balloons are then filled with water to put pressure on the cervix and help the cervix soften and open. Depending on the type of balloon used, you may be allowed to go home after it is placed.  After your cervix is ready:  We may give you medicine through an IV (a small tube placed in your vein). This medicine is called Pitocin. It helps the muscles of your uterus to contract.  We may make a small opening in your bag of water (the sac around your baby). This is called an " "amniotomy. Sometimes called \"breaking the water\". It may help your uterus contract and your cervix open.  What might happen if my labor is induced?  Some of this depends on how your labor is started and how your body responds. Your labor may be more complicated. You and your baby may need more medical treatments. In general:  You may not go into labor right away. If so, we may send you home with follow-up instructions.  It will be important to monitor you and your baby during the induction.  You may not be able to move around during labor. You will have two belts with monitors attached to your belly. These allow the nurse to watch your contractions and your baby's heart rate.  Your labor may take longer than if you went into labor on your own. It might take more than one day.  If you need cervical ripening, it is important to know that it may be many hours (even days) until delivery happens.  Cervical ripening can be slow and require several doses before your body is ready to labor.  A long labor may increase the risk of infection in mother and baby.  Your labor may not progress as planned. This could lead to a  birth.  Can I plan the date of my delivery?  After 39 weeks, you may ask about planning your delivery date. This is only an option if your body--and your baby--are ready. To find out, we will check your cervix and your baby's heart rate.   If you are ready to be induced, we will give you a date and time to come to the hospital. If many patients are in labor that day, we may need to start your labor at another time.  If you are not ready, we will not start your labor. It will be safer for your baby to come on its own.  What else do I need to know?  Before you have an induction, make sure you understand the reasons, risks and benefits. Ask your doctor or nurse-midwife:  Why do I need to be induced?  What are the risks to my baby?  How will you start my labor?  How will you know if my baby is ready to " be born?  How will you know if my body is ready to give birth?  Where can I get more information?  To learn more about induction, you may visit these websites:  The American College of Nurse-Midwives:  www.mymidwife.org  The American College of Obstetricians and Gynecologists: www.acog.org  Childbirth Connection:  www.childbirthconnection.org   Dimes: www.marchofdimes.Cortilia  Association of Women's Health, Obstetrics, and  Nursing  www.piukho9pez.org/go-the-full-40&#047;  For informational purposes only. Not to replace the advice of your health care provider. Copyright   2008 [facility]. All rights reserved. Clinically reviewed by the  System Operations [employer] team. [employer] [id number] - REV .

## 2023-12-26 NOTE — PATIENT INSTRUCTIONS
Week 38 of Your Pregnancy: Care Instructions  Believe it or not, your baby is almost here. You may notice how your baby responds to sounds, warmth, cold, and light. You may even know what kind of music your baby likes.    Even if you expect a vaginal birth, it's a good idea to learn about  section ().  is the delivery of a baby through a cut (incision) in your belly. It's a major surgery, so it has more risks than a vaginal birth.   During the first 2 weeks after the birth, limit visitors. Don't allow visitors who have colds or infections. Ask visitors to wash their hands before they touch your baby. And never let anyone smoke around your baby.     Know about unplanned C-sections.  Reasons for an unplanned  include labor that slows or stops, signs of distress in your baby, and high blood pressure or other problems for you.     Know about planned C-sections.  If your baby isn't in a head-down position for delivery (breech position), your doctor may plan a . Or you may have a planned  if you're having twins or more.     Get as much help as you can while you're in the hospital.  You can learn about feeding, diapering, and bathing your baby.     Talk to your doctor or midwife about how to care for the umbilical cord stump.  If your baby will be circumcised, also ask about how to care for that.     Ask friends or family for help, as you need it.  If you can, have another adult in your home for at least 2 or 3 days after the birth.     Try to nap when your baby naps.  This may be your best chance to get some sleep.     Watch for changes in your mental health.  For the first 1 to 2 weeks after birth, it's common to cry or feel sad or irritable. If these feelings last for more than 2 weeks, you may have postpartum depression. Ask your doctor for help. Postpartum depression can be treated.   Follow-up care is a key part of your treatment and safety. Be sure to make and go  "to all appointments, and call your doctor if you are having problems. It's also a good idea to know your test results and keep a list of the medicines you take.  Where can you learn more?  Go to https://www.[employer].net/patiented  Enter B044 in the search box to learn more about \"Week 38 of Your Pregnancy: Care Instructions.\"  Current as of: July 11, 2023               Content Version: 13.8    7963-7155 [employer].   Care instructions adapted under license by your healthcare professional. If you have questions about a medical condition or this instruction, always ask your healthcare professional. [employer] disclaims any warranty or liability for your use of this information.      "

## 2023-12-27 ENCOUNTER — PRENATAL OFFICE VISIT (OUTPATIENT)
Dept: OBGYN | Facility: CLINIC | Age: 33
End: 2023-12-27
Payer: MEDICAID

## 2023-12-27 VITALS
BODY MASS INDEX: 38.54 KG/M2 | DIASTOLIC BLOOD PRESSURE: 82 MMHG | SYSTOLIC BLOOD PRESSURE: 125 MMHG | WEIGHT: 217.5 LBS | TEMPERATURE: 97 F | HEART RATE: 90 BPM | HEIGHT: 63 IN | RESPIRATION RATE: 18 BRPM

## 2023-12-27 DIAGNOSIS — Z34.83 PRENATAL CARE, SUBSEQUENT PREGNANCY IN THIRD TRIMESTER: Primary | ICD-10-CM

## 2023-12-27 PROCEDURE — 99207 PR PRENATAL VISIT: CPT | Performed by: OBSTETRICS & GYNECOLOGY

## 2023-12-27 NOTE — PROGRESS NOTES
"     [facility]  Return OB Note     Subjective:  Denies vaginal bleeding, loss of fluid, or regular contractions. Noted normal fetal movement.  Complaints today: none     Objective:  /82 (BP Location: Right arm, Patient Position: Chair, Cuff Size: Adult Regular)   Pulse 90   Temp 97  F (36.1  C) (Tympanic)   Resp 18   Ht 1.6 m (5' 3\")   Wt 98.7 kg (217 lb 8 oz)   LMP 2023 (Within Weeks)   BMI 38.53 kg/m       Assessment/Plan:   Gloria Rodríguez is a 33 year old  at 38w4d by 10w6d, here for return OB visit.  -NOB labs nl. Midtrimester labs nl.  -NIPT low risk.   -Anatomy US showed placenta previa, which resolved on subsequent ultrasound.  -Deferred Tdap vaccine till next visit due to ongoing infection. RSV vaccine discussion today and brochure given today. Declined flu shot. Considering covid vaccine - advised pharmacy or FP visit.     -H/o CS x2: plan on repeat CS WITHOUT tubal ligation, scheduled for   -H/o gestational diabetes: hgba1c normal at 5.3 on 2023  -Anxiety/depression/PTSD/h/o PPD: mood stable without medications.  -discussed PT referral for sciatic pain.  She will try some home stretches first.     Scheduled for repeat c/s on 2023, labor and FM precautions reviewed and OB triage # given     Donna Castelan MD  OB/GYN         "

## 2023-12-29 ENCOUNTER — ANESTHESIA EVENT (OUTPATIENT)
Dept: SURGERY | Facility: CLINIC | Age: 33
End: 2023-12-29
Payer: COMMERCIAL

## 2023-12-29 NOTE — ANESTHESIA PREPROCEDURE EVALUATION
Anesthesia Pre-Procedure Evaluation    Patient: Gloria VILLAFANA Marcos   MRN: [id number] : 1990        Procedure : Procedure(s):   SECTION          Past Medical History:   Diagnosis Date    Anxiety     Depression     Gestational diabetes mellitus 2018    NO ACTIVE PROBLEMS     Postpartum depression 2016    PTSD (post-traumatic stress disorder)       Past Surgical History:   Procedure Laterality Date     SECTION      ,2018    PILONIDAL CYST / SINUS EXCISION        Allergies   Allergen Reactions    Banana Hives    Fruit Extracts      Bananas, itchy mouth    Ibuprofen Sodium Hives    Oseltamivir      Other reaction(s): Tremors    Sulfa Antibiotics Hives    Other [No Clinical Screening - See Comments] Rash     bleach    Silver Rash    Sodium Hypochlorite Other (See Comments) and Rash      Social History     Tobacco Use    Smoking status: Never    Smokeless tobacco: Never   Substance Use Topics    Alcohol use: No      Wt Readings from Last 1 Encounters:   23 98.7 kg (217 lb 8 oz)        Anesthesia Evaluation   Pt has had prior anesthetic.         ROS/MED HX  ENT/Pulmonary:       Neurologic:       Cardiovascular:       METS/Exercise Tolerance:     Hematologic:       Musculoskeletal:       GI/Hepatic:       Renal/Genitourinary:       Endo:     (+) type I DM,                     Psychiatric/Substance Use: Comment: PTSD (post-traumatic stress disorder)    (+) psychiatric history anxiety, depression and other (comment)       Infectious Disease:       Malignancy:       Other:            Physical Exam    Airway  airway exam normal      Mallampati: I   TM distance: > 3 FB   Neck ROM: full   Mouth opening: > 3 cm    Respiratory Devices and Support         Dental       (+) Completely normal teeth      Cardiovascular   cardiovascular exam normal       Rhythm and rate: regular and normal     Pulmonary   pulmonary exam normal        breath sounds clear to auscultation           OUTSIDE LABS:  CBC:   Lab  "Results   Component Value Date    WBC 11.9 (H) 10/05/2023    WBC 15.7 (H) 06/12/2023    HGB 12.7 10/05/2023    HGB 14.9 06/12/2023    HCT 38.5 10/05/2023    HCT 43.8 06/12/2023     10/05/2023     06/12/2023     BMP:   Lab Results   Component Value Date     06/12/2023     02/23/2017    POTASSIUM 3.6 06/12/2023    POTASSIUM 4.0 02/23/2017    CHLORIDE 102 06/12/2023    CHLORIDE 108 02/23/2017    CO2 24 06/12/2023    CO2 19 (L) 02/23/2017    BUN 4.7 (L) 06/12/2023    BUN 12 02/23/2017    CR 0.54 06/12/2023    CR 0.72 02/23/2017    GLC 88 06/12/2023    GLC 94 09/14/2022     COAGS: No results found for: \"PTT\", \"INR\", \"FIBR\"  POC:   Lab Results   Component Value Date    HCGS Positive (A) 06/12/2023     HEPATIC:   Lab Results   Component Value Date    ALBUMIN 4.2 06/12/2023    PROTTOTAL 7.8 06/12/2023    ALT 9 (L) 06/12/2023    AST 14 06/12/2023    ALKPHOS 67 06/12/2023    BILITOTAL 0.3 06/12/2023     OTHER:   Lab Results   Component Value Date    A1C 5.3 06/12/2023    JHON 9.4 06/12/2023    LIPASE 27 06/12/2023    TSH 0.10 (L) 09/14/2022    T4 0.91 09/14/2022       Anesthesia Plan    ASA Status:  2    NPO Status:  NPO Appropriate    Anesthesia Type: Spinal.              Consents    Anesthesia Plan(s) and associated risks, benefits, and realistic alternatives discussed. Questions answered and patient/representative(s) expressed understanding.     - Discussed: Risks, Benefits and Alternatives for the PROCEDURE were discussed     - Discussed with:  Patient      - Extended Intubation/Ventilatory Support Discussed: No.      - Patient is DNR/DNI Status: No     Use of blood products discussed: No .     Postoperative Care    Pain management: intrathecal morphine.   PONV prophylaxis: Ondansetron (or other 5HT-3)     Comments:               MACARENA Diaz CRNA    I have reviewed the pertinent notes and labs in the chart from the past 30 days and (re)examined the patient.  Any updates or changes from " those notes are reflected in this note.

## 2024-01-02 ENCOUNTER — HOSPITAL ENCOUNTER (INPATIENT)
Facility: CLINIC | Age: 34
LOS: 2 days | Discharge: HOME | End: 2024-01-04
Attending: OBSTETRICS & GYNECOLOGY | Admitting: OBSTETRICS & GYNECOLOGY
Payer: COMMERCIAL

## 2024-01-02 ENCOUNTER — ANESTHESIA (OUTPATIENT)
Dept: SURGERY | Facility: CLINIC | Age: 34
End: 2024-01-02
Payer: COMMERCIAL

## 2024-01-02 DIAGNOSIS — Z98.891 S/P REPEAT LOW TRANSVERSE C-SECTION: ICD-10-CM

## 2024-01-02 PROBLEM — Z98.890 POSTOPERATIVE STATE: Status: ACTIVE | Noted: 2024-01-02

## 2024-01-02 LAB
ABO/RH(D): NORMAL
ANTIBODY SCREEN: NEGATIVE
BASOPHILS # BLD AUTO: 0 10E3/UL (ref 0–0.2)
BASOPHILS NFR BLD AUTO: 0 %
CREAT SERPL-MCNC: 0.53 MG/DL (ref 0.51–0.95)
EGFRCR SERPLBLD CKD-EPI 2021: >90 ML/MIN/1.73M2
EOSINOPHIL # BLD AUTO: 0.2 10E3/UL (ref 0–0.7)
EOSINOPHIL NFR BLD AUTO: 2 %
ERYTHROCYTE [DISTWIDTH] IN BLOOD BY AUTOMATED COUNT: 13.8 % (ref 10–15)
HCT VFR BLD AUTO: 38.6 % (ref 35–47)
HGB BLD-MCNC: 13 G/DL (ref 11.7–15.7)
IMM GRANULOCYTES # BLD: 0.1 10E3/UL
IMM GRANULOCYTES NFR BLD: 1 %
LYMPHOCYTES # BLD AUTO: 2.6 10E3/UL (ref 0.8–5.3)
LYMPHOCYTES NFR BLD AUTO: 27 %
MCH RBC QN AUTO: 29.6 PG (ref 26.5–33)
MCHC RBC AUTO-ENTMCNC: 33.7 G/DL (ref 31.5–36.5)
MCV RBC AUTO: 88 FL (ref 78–100)
MONOCYTES # BLD AUTO: 1 10E3/UL (ref 0–1.3)
MONOCYTES NFR BLD AUTO: 10 %
NEUTROPHILS # BLD AUTO: 5.8 10E3/UL (ref 1.6–8.3)
NEUTROPHILS NFR BLD AUTO: 60 %
NRBC # BLD AUTO: 0 10E3/UL
NRBC BLD AUTO-RTO: 0 /100
PLATELET # BLD AUTO: 305 10E3/UL (ref 150–450)
RBC # BLD AUTO: 4.39 10E6/UL (ref 3.8–5.2)
SPECIMEN EXPIRATION DATE: NORMAL
T PALLIDUM AB SER QL: NONREACTIVE
WBC # BLD AUTO: 9.6 10E3/UL (ref 4–11)

## 2024-01-02 PROCEDURE — 120N000001 HC R&B MED SURG/OB

## 2024-01-02 PROCEDURE — 36415 COLL VENOUS BLD VENIPUNCTURE: CPT | Performed by: OBSTETRICS & GYNECOLOGY

## 2024-01-02 PROCEDURE — 250N000013 HC RX MED GY IP 250 OP 250 PS 637: Performed by: OBSTETRICS & GYNECOLOGY

## 2024-01-02 PROCEDURE — 86780 TREPONEMA PALLIDUM: CPT | Performed by: OBSTETRICS & GYNECOLOGY

## 2024-01-02 PROCEDURE — 82565 ASSAY OF CREATININE: CPT | Performed by: OBSTETRICS & GYNECOLOGY

## 2024-01-02 PROCEDURE — 370N000017 HC ANESTHESIA TECHNICAL FEE, PER MIN: Performed by: OBSTETRICS & GYNECOLOGY

## 2024-01-02 PROCEDURE — 258N000003 HC RX IP 258 OP 636

## 2024-01-02 PROCEDURE — 710N000010 HC RECOVERY PHASE 1, LEVEL 2, PER MIN: Performed by: OBSTETRICS & GYNECOLOGY

## 2024-01-02 PROCEDURE — 250N000009 HC RX 250: Performed by: NURSE ANESTHETIST, CERTIFIED REGISTERED

## 2024-01-02 PROCEDURE — 86900 BLOOD TYPING SEROLOGIC ABO: CPT | Performed by: OBSTETRICS & GYNECOLOGY

## 2024-01-02 PROCEDURE — 250N000009 HC RX 250: Performed by: OBSTETRICS & GYNECOLOGY

## 2024-01-02 PROCEDURE — 360N000076 HC SURGERY LEVEL 3, PER MIN: Performed by: OBSTETRICS & GYNECOLOGY

## 2024-01-02 PROCEDURE — 250N000011 HC RX IP 250 OP 636: Performed by: NURSE ANESTHETIST, CERTIFIED REGISTERED

## 2024-01-02 PROCEDURE — 272N000001 HC OR GENERAL SUPPLY STERILE: Performed by: OBSTETRICS & GYNECOLOGY

## 2024-01-02 PROCEDURE — 85025 COMPLETE CBC W/AUTO DIFF WBC: CPT | Performed by: OBSTETRICS & GYNECOLOGY

## 2024-01-02 PROCEDURE — 59515 CESAREAN DELIVERY: CPT | Performed by: OBSTETRICS & GYNECOLOGY

## 2024-01-02 PROCEDURE — 250N000011 HC RX IP 250 OP 636: Performed by: OBSTETRICS & GYNECOLOGY

## 2024-01-02 PROCEDURE — 258N000003 HC RX IP 258 OP 636: Performed by: NURSE ANESTHETIST, CERTIFIED REGISTERED

## 2024-01-02 PROCEDURE — 271N000001 HC OR GENERAL SUPPLY NON-STERILE: Performed by: OBSTETRICS & GYNECOLOGY

## 2024-01-02 RX ORDER — METOCLOPRAMIDE HYDROCHLORIDE 5 MG/ML
10 INJECTION INTRAMUSCULAR; INTRAVENOUS EVERY 6 HOURS PRN
Status: DISCONTINUED | OUTPATIENT
Start: 2024-01-02 | End: 2024-01-04 | Stop reason: HOSPADM

## 2024-01-02 RX ORDER — HYDROMORPHONE HCL IN WATER/PF 6 MG/30 ML
0.4 PATIENT CONTROLLED ANALGESIA SYRINGE INTRAVENOUS EVERY 5 MIN PRN
Status: DISCONTINUED | OUTPATIENT
Start: 2024-01-02 | End: 2024-01-04 | Stop reason: HOSPADM

## 2024-01-02 RX ORDER — BISACODYL 10 MG
10 SUPPOSITORY, RECTAL RECTAL DAILY PRN
Status: DISCONTINUED | OUTPATIENT
Start: 2024-01-04 | End: 2024-01-04 | Stop reason: HOSPADM

## 2024-01-02 RX ORDER — NALOXONE HYDROCHLORIDE 0.4 MG/ML
0.4 INJECTION, SOLUTION INTRAMUSCULAR; INTRAVENOUS; SUBCUTANEOUS
Status: DISCONTINUED | OUTPATIENT
Start: 2024-01-02 | End: 2024-01-04 | Stop reason: HOSPADM

## 2024-01-02 RX ORDER — LABETALOL HYDROCHLORIDE 5 MG/ML
10 INJECTION, SOLUTION INTRAVENOUS
Status: DISCONTINUED | OUTPATIENT
Start: 2024-01-02 | End: 2024-01-04 | Stop reason: HOSPADM

## 2024-01-02 RX ORDER — MISOPROSTOL 200 UG/1
800 TABLET ORAL
Status: DISCONTINUED | OUTPATIENT
Start: 2024-01-02 | End: 2024-01-04 | Stop reason: HOSPADM

## 2024-01-02 RX ORDER — OXYTOCIN/0.9 % SODIUM CHLORIDE 30/500 ML
100-340 PLASTIC BAG, INJECTION (ML) INTRAVENOUS CONTINUOUS PRN
Status: DISCONTINUED | OUTPATIENT
Start: 2024-01-02 | End: 2024-01-04 | Stop reason: HOSPADM

## 2024-01-02 RX ORDER — NALBUPHINE HYDROCHLORIDE 10 MG/ML
2.5-5 INJECTION, SOLUTION INTRAMUSCULAR; INTRAVENOUS; SUBCUTANEOUS EVERY 6 HOURS PRN
Status: DISCONTINUED | OUTPATIENT
Start: 2024-01-02 | End: 2024-01-02

## 2024-01-02 RX ORDER — LORATADINE 10 MG/1
10 TABLET ORAL DAILY
Status: DISCONTINUED | OUTPATIENT
Start: 2024-01-02 | End: 2024-01-04 | Stop reason: HOSPADM

## 2024-01-02 RX ORDER — ONDANSETRON 4 MG/1
4 TABLET, ORALLY DISINTEGRATING ORAL EVERY 6 HOURS PRN
Status: DISCONTINUED | OUTPATIENT
Start: 2024-01-02 | End: 2024-01-04 | Stop reason: HOSPADM

## 2024-01-02 RX ORDER — BUPIVACAINE HYDROCHLORIDE 7.5 MG/ML
INJECTION, SOLUTION INTRASPINAL PRN
Status: DISCONTINUED | OUTPATIENT
Start: 2024-01-02 | End: 2024-01-02

## 2024-01-02 RX ORDER — OXYCODONE HYDROCHLORIDE 5 MG/1
5-10 TABLET ORAL
Status: DISCONTINUED | OUTPATIENT
Start: 2024-01-02 | End: 2024-01-04 | Stop reason: HOSPADM

## 2024-01-02 RX ORDER — MODIFIED LANOLIN
OINTMENT (GRAM) TOPICAL
Status: DISCONTINUED | OUTPATIENT
Start: 2024-01-02 | End: 2024-01-04 | Stop reason: HOSPADM

## 2024-01-02 RX ORDER — CITRIC ACID/SODIUM CITRATE 334-500MG
30 SOLUTION, ORAL ORAL
Status: COMPLETED | OUTPATIENT
Start: 2024-01-02 | End: 2024-01-02

## 2024-01-02 RX ORDER — NALOXONE HYDROCHLORIDE 0.4 MG/ML
0.2 INJECTION, SOLUTION INTRAMUSCULAR; INTRAVENOUS; SUBCUTANEOUS
Status: DISCONTINUED | OUTPATIENT
Start: 2024-01-02 | End: 2024-01-04 | Stop reason: HOSPADM

## 2024-01-02 RX ORDER — OXYTOCIN/0.9 % SODIUM CHLORIDE 30/500 ML
340 PLASTIC BAG, INJECTION (ML) INTRAVENOUS CONTINUOUS PRN
Status: DISCONTINUED | OUTPATIENT
Start: 2024-01-02 | End: 2024-01-04 | Stop reason: HOSPADM

## 2024-01-02 RX ORDER — FENTANYL CITRATE 50 UG/ML
25 INJECTION, SOLUTION INTRAMUSCULAR; INTRAVENOUS EVERY 5 MIN PRN
Status: DISCONTINUED | OUTPATIENT
Start: 2024-01-02 | End: 2024-01-04 | Stop reason: HOSPADM

## 2024-01-02 RX ORDER — MORPHINE SULFATE 0.5 MG/ML
150 INJECTION, SOLUTION EPIDURAL; INTRATHECAL; INTRAVENOUS ONCE
Status: COMPLETED | OUTPATIENT
Start: 2024-01-02 | End: 2024-01-02

## 2024-01-02 RX ORDER — PROCHLORPERAZINE MALEATE 10 MG
10 TABLET ORAL EVERY 6 HOURS PRN
Status: DISCONTINUED | OUTPATIENT
Start: 2024-01-02 | End: 2024-01-04 | Stop reason: HOSPADM

## 2024-01-02 RX ORDER — SODIUM CHLORIDE, SODIUM LACTATE, POTASSIUM CHLORIDE, CALCIUM CHLORIDE 600; 310; 30; 20 MG/100ML; MG/100ML; MG/100ML; MG/100ML
INJECTION, SOLUTION INTRAVENOUS CONTINUOUS
Status: DISCONTINUED | OUTPATIENT
Start: 2024-01-02 | End: 2024-01-04 | Stop reason: HOSPADM

## 2024-01-02 RX ORDER — SODIUM CHLORIDE, SODIUM LACTATE, POTASSIUM CHLORIDE, CALCIUM CHLORIDE 600; 310; 30; 20 MG/100ML; MG/100ML; MG/100ML; MG/100ML
INJECTION, SOLUTION INTRAVENOUS CONTINUOUS
Status: DISCONTINUED | OUTPATIENT
Start: 2024-01-02 | End: 2024-01-02 | Stop reason: HOSPADM

## 2024-01-02 RX ORDER — METHYLERGONOVINE MALEATE 0.2 MG/ML
200 INJECTION INTRAVENOUS
Status: DISCONTINUED | OUTPATIENT
Start: 2024-01-02 | End: 2024-01-04 | Stop reason: HOSPADM

## 2024-01-02 RX ORDER — METHYLERGONOVINE MALEATE 0.2 MG/ML
200 INJECTION INTRAVENOUS
Status: DISCONTINUED | OUTPATIENT
Start: 2024-01-02 | End: 2024-01-02 | Stop reason: HOSPADM

## 2024-01-02 RX ORDER — FENTANYL CITRATE 50 UG/ML
50 INJECTION, SOLUTION INTRAMUSCULAR; INTRAVENOUS EVERY 5 MIN PRN
Status: DISCONTINUED | OUTPATIENT
Start: 2024-01-02 | End: 2024-01-04 | Stop reason: HOSPADM

## 2024-01-02 RX ORDER — EPHEDRINE SULFATE 50 MG/ML
INJECTION, SOLUTION INTRAMUSCULAR; INTRAVENOUS; SUBCUTANEOUS PRN
Status: DISCONTINUED | OUTPATIENT
Start: 2024-01-02 | End: 2024-01-02

## 2024-01-02 RX ORDER — LIDOCAINE 40 MG/G
CREAM TOPICAL
Status: DISCONTINUED | OUTPATIENT
Start: 2024-01-02 | End: 2024-01-04 | Stop reason: HOSPADM

## 2024-01-02 RX ORDER — ONDANSETRON 2 MG/ML
INJECTION INTRAMUSCULAR; INTRAVENOUS PRN
Status: DISCONTINUED | OUTPATIENT
Start: 2024-01-02 | End: 2024-01-02

## 2024-01-02 RX ORDER — ONDANSETRON 2 MG/ML
4 INJECTION INTRAMUSCULAR; INTRAVENOUS EVERY 30 MIN PRN
Status: DISCONTINUED | OUTPATIENT
Start: 2024-01-02 | End: 2024-01-04 | Stop reason: HOSPADM

## 2024-01-02 RX ORDER — LIDOCAINE 40 MG/G
CREAM TOPICAL
Status: DISCONTINUED | OUTPATIENT
Start: 2024-01-02 | End: 2024-01-02 | Stop reason: HOSPADM

## 2024-01-02 RX ORDER — SIMETHICONE 80 MG
80 TABLET,CHEWABLE ORAL 4 TIMES DAILY PRN
Status: DISCONTINUED | OUTPATIENT
Start: 2024-01-02 | End: 2024-01-04 | Stop reason: HOSPADM

## 2024-01-02 RX ORDER — DEXTROSE, SODIUM CHLORIDE, SODIUM LACTATE, POTASSIUM CHLORIDE, AND CALCIUM CHLORIDE 5; .6; .31; .03; .02 G/100ML; G/100ML; G/100ML; G/100ML; G/100ML
INJECTION, SOLUTION INTRAVENOUS CONTINUOUS
Status: DISCONTINUED | OUTPATIENT
Start: 2024-01-02 | End: 2024-01-04 | Stop reason: HOSPADM

## 2024-01-02 RX ORDER — ONDANSETRON 2 MG/ML
4 INJECTION INTRAMUSCULAR; INTRAVENOUS EVERY 6 HOURS PRN
Status: DISCONTINUED | OUTPATIENT
Start: 2024-01-02 | End: 2024-01-04 | Stop reason: HOSPADM

## 2024-01-02 RX ORDER — FENTANYL CITRATE 50 UG/ML
15 INJECTION, SOLUTION INTRAMUSCULAR; INTRAVENOUS ONCE
Status: COMPLETED | OUTPATIENT
Start: 2024-01-02 | End: 2024-01-02

## 2024-01-02 RX ORDER — CEFAZOLIN SODIUM/WATER 2 G/20 ML
2 SYRINGE (ML) INTRAVENOUS SEE ADMIN INSTRUCTIONS
Status: DISCONTINUED | OUTPATIENT
Start: 2024-01-02 | End: 2024-01-02 | Stop reason: HOSPADM

## 2024-01-02 RX ORDER — PROCHLORPERAZINE 25 MG
25 SUPPOSITORY, RECTAL RECTAL EVERY 12 HOURS PRN
Status: DISCONTINUED | OUTPATIENT
Start: 2024-01-02 | End: 2024-01-04 | Stop reason: HOSPADM

## 2024-01-02 RX ORDER — ACETAMINOPHEN 325 MG/1
975 TABLET ORAL EVERY 6 HOURS
Status: DISCONTINUED | OUTPATIENT
Start: 2024-01-02 | End: 2024-01-04 | Stop reason: HOSPADM

## 2024-01-02 RX ORDER — MISOPROSTOL 200 UG/1
800 TABLET ORAL
Status: DISCONTINUED | OUTPATIENT
Start: 2024-01-02 | End: 2024-01-02 | Stop reason: HOSPADM

## 2024-01-02 RX ORDER — MISOPROSTOL 200 UG/1
400 TABLET ORAL
Status: DISCONTINUED | OUTPATIENT
Start: 2024-01-02 | End: 2024-01-02 | Stop reason: HOSPADM

## 2024-01-02 RX ORDER — MISOPROSTOL 200 UG/1
400 TABLET ORAL
Status: DISCONTINUED | OUTPATIENT
Start: 2024-01-02 | End: 2024-01-04 | Stop reason: HOSPADM

## 2024-01-02 RX ORDER — ACETAMINOPHEN 325 MG/1
975 TABLET ORAL ONCE
Status: COMPLETED | OUTPATIENT
Start: 2024-01-02 | End: 2024-01-02

## 2024-01-02 RX ORDER — OXYTOCIN/0.9 % SODIUM CHLORIDE 30/500 ML
340 PLASTIC BAG, INJECTION (ML) INTRAVENOUS CONTINUOUS PRN
Status: COMPLETED | OUTPATIENT
Start: 2024-01-02 | End: 2024-01-02

## 2024-01-02 RX ORDER — DIMENHYDRINATE 50 MG/ML
25 INJECTION, SOLUTION INTRAMUSCULAR; INTRAVENOUS
Status: DISCONTINUED | OUTPATIENT
Start: 2024-01-02 | End: 2024-01-04 | Stop reason: HOSPADM

## 2024-01-02 RX ORDER — HYDROMORPHONE HCL IN WATER/PF 6 MG/30 ML
0.2 PATIENT CONTROLLED ANALGESIA SYRINGE INTRAVENOUS EVERY 5 MIN PRN
Status: DISCONTINUED | OUTPATIENT
Start: 2024-01-02 | End: 2024-01-04 | Stop reason: HOSPADM

## 2024-01-02 RX ORDER — AMOXICILLIN 250 MG
2 CAPSULE ORAL 2 TIMES DAILY
Status: DISCONTINUED | OUTPATIENT
Start: 2024-01-02 | End: 2024-01-04 | Stop reason: HOSPADM

## 2024-01-02 RX ORDER — ENOXAPARIN SODIUM 100 MG/ML
40 INJECTION SUBCUTANEOUS EVERY 24 HOURS
Status: DISCONTINUED | OUTPATIENT
Start: 2024-01-03 | End: 2024-01-04 | Stop reason: HOSPADM

## 2024-01-02 RX ORDER — CEFAZOLIN SODIUM/WATER 2 G/20 ML
2 SYRINGE (ML) INTRAVENOUS
Status: COMPLETED | OUTPATIENT
Start: 2024-01-02 | End: 2024-01-02

## 2024-01-02 RX ORDER — DIPHENHYDRAMINE HCL 25 MG
25 CAPSULE ORAL EVERY 6 HOURS PRN
Status: DISCONTINUED | OUTPATIENT
Start: 2024-01-02 | End: 2024-01-04 | Stop reason: HOSPADM

## 2024-01-02 RX ORDER — OXYTOCIN 10 [USP'U]/ML
10 INJECTION, SOLUTION INTRAMUSCULAR; INTRAVENOUS
Status: DISCONTINUED | OUTPATIENT
Start: 2024-01-02 | End: 2024-01-04 | Stop reason: HOSPADM

## 2024-01-02 RX ORDER — METOCLOPRAMIDE 10 MG/1
10 TABLET ORAL EVERY 6 HOURS PRN
Status: DISCONTINUED | OUTPATIENT
Start: 2024-01-02 | End: 2024-01-04 | Stop reason: HOSPADM

## 2024-01-02 RX ORDER — CARBOPROST TROMETHAMINE 250 UG/ML
250 INJECTION, SOLUTION INTRAMUSCULAR
Status: DISCONTINUED | OUTPATIENT
Start: 2024-01-02 | End: 2024-01-02 | Stop reason: HOSPADM

## 2024-01-02 RX ORDER — ONDANSETRON 4 MG/1
4 TABLET, ORALLY DISINTEGRATING ORAL EVERY 30 MIN PRN
Status: DISCONTINUED | OUTPATIENT
Start: 2024-01-02 | End: 2024-01-04 | Stop reason: HOSPADM

## 2024-01-02 RX ORDER — OXYTOCIN 10 [USP'U]/ML
10 INJECTION, SOLUTION INTRAMUSCULAR; INTRAVENOUS
Status: DISCONTINUED | OUTPATIENT
Start: 2024-01-02 | End: 2024-01-02 | Stop reason: HOSPADM

## 2024-01-02 RX ORDER — DIPHENHYDRAMINE HYDROCHLORIDE 50 MG/ML
25 INJECTION INTRAMUSCULAR; INTRAVENOUS EVERY 6 HOURS PRN
Status: DISCONTINUED | OUTPATIENT
Start: 2024-01-02 | End: 2024-01-04 | Stop reason: HOSPADM

## 2024-01-02 RX ORDER — HYDROCORTISONE 25 MG/G
CREAM TOPICAL 3 TIMES DAILY PRN
Status: DISCONTINUED | OUTPATIENT
Start: 2024-01-02 | End: 2024-01-04 | Stop reason: HOSPADM

## 2024-01-02 RX ORDER — CARBOPROST TROMETHAMINE 250 UG/ML
250 INJECTION, SOLUTION INTRAMUSCULAR
Status: DISCONTINUED | OUTPATIENT
Start: 2024-01-02 | End: 2024-01-04 | Stop reason: HOSPADM

## 2024-01-02 RX ORDER — TRANEXAMIC ACID 10 MG/ML
1 INJECTION, SOLUTION INTRAVENOUS EVERY 30 MIN PRN
Status: DISCONTINUED | OUTPATIENT
Start: 2024-01-02 | End: 2024-01-04 | Stop reason: HOSPADM

## 2024-01-02 RX ORDER — TRANEXAMIC ACID 10 MG/ML
1 INJECTION, SOLUTION INTRAVENOUS EVERY 30 MIN PRN
Status: DISCONTINUED | OUTPATIENT
Start: 2024-01-02 | End: 2024-01-02 | Stop reason: HOSPADM

## 2024-01-02 RX ORDER — AMOXICILLIN 250 MG
1 CAPSULE ORAL 2 TIMES DAILY
Status: DISCONTINUED | OUTPATIENT
Start: 2024-01-02 | End: 2024-01-04 | Stop reason: HOSPADM

## 2024-01-02 RX ORDER — FENTANYL CITRATE-0.9 % NACL/PF 10 MCG/ML
100 PLASTIC BAG, INJECTION (ML) INTRAVENOUS EVERY 5 MIN PRN
Status: DISCONTINUED | OUTPATIENT
Start: 2024-01-02 | End: 2024-01-02 | Stop reason: HOSPADM

## 2024-01-02 RX ADMIN — ACETAMINOPHEN 975 MG: 325 TABLET, FILM COATED ORAL at 06:37

## 2024-01-02 RX ADMIN — SODIUM CITRATE AND CITRIC ACID MONOHYDRATE 30 ML: 500; 334 SOLUTION ORAL at 06:37

## 2024-01-02 RX ADMIN — LORATADINE 10 MG: 10 TABLET ORAL at 16:41

## 2024-01-02 RX ADMIN — Medication 10 MG: at 07:39

## 2024-01-02 RX ADMIN — Medication 340 ML/HR: at 08:27

## 2024-01-02 RX ADMIN — ACETAMINOPHEN 975 MG: 325 TABLET, FILM COATED ORAL at 23:53

## 2024-01-02 RX ADMIN — PHENYLEPHRINE HYDROCHLORIDE 100 MCG: 10 INJECTION INTRAVENOUS at 07:54

## 2024-01-02 RX ADMIN — PHENYLEPHRINE HYDROCHLORIDE 100 MCG: 10 INJECTION INTRAVENOUS at 07:57

## 2024-01-02 RX ADMIN — ACETAMINOPHEN 975 MG: 325 TABLET, FILM COATED ORAL at 16:41

## 2024-01-02 RX ADMIN — METOCLOPRAMIDE HYDROCHLORIDE 10 MG: 5 INJECTION INTRAMUSCULAR; INTRAVENOUS at 12:05

## 2024-01-02 RX ADMIN — FENTANYL CITRATE 15 MCG: 50 INJECTION INTRAMUSCULAR; INTRAVENOUS at 07:36

## 2024-01-02 RX ADMIN — SODIUM CHLORIDE, SODIUM LACTATE, POTASSIUM CHLORIDE, CALCIUM CHLORIDE AND DEXTROSE MONOHYDRATE: 5; 600; 310; 30; 20 INJECTION, SOLUTION INTRAVENOUS at 10:08

## 2024-01-02 RX ADMIN — MICONAZOLE NITRATE ANTIFUNGAL POWDER: 2 POWDER TOPICAL at 20:41

## 2024-01-02 RX ADMIN — BUPIVACAINE HYDROCHLORIDE IN DEXTROSE 1.6 ML: 7.5 INJECTION, SOLUTION SUBARACHNOID at 07:36

## 2024-01-02 RX ADMIN — PHENYLEPHRINE HYDROCHLORIDE 40 MCG/MIN: 10 INJECTION INTRAVENOUS at 07:37

## 2024-01-02 RX ADMIN — ONDANSETRON 4 MG: 2 INJECTION INTRAMUSCULAR; INTRAVENOUS at 07:39

## 2024-01-02 RX ADMIN — Medication 2 G: at 07:30

## 2024-01-02 RX ADMIN — Medication 340 ML/HR: at 08:01

## 2024-01-02 RX ADMIN — SODIUM CHLORIDE, POTASSIUM CHLORIDE, SODIUM LACTATE AND CALCIUM CHLORIDE: 600; 310; 30; 20 INJECTION, SOLUTION INTRAVENOUS at 07:45

## 2024-01-02 RX ADMIN — MORPHINE SULFATE 150 MCG: 0.5 INJECTION, SOLUTION EPIDURAL; INTRATHECAL; INTRAVENOUS at 07:36

## 2024-01-02 RX ADMIN — SODIUM CHLORIDE, POTASSIUM CHLORIDE, SODIUM LACTATE AND CALCIUM CHLORIDE: 600; 310; 30; 20 INJECTION, SOLUTION INTRAVENOUS at 07:29

## 2024-01-02 RX ADMIN — PHENYLEPHRINE HYDROCHLORIDE 20 MCG/MIN: 10 INJECTION INTRAVENOUS at 07:44

## 2024-01-02 ASSESSMENT — ACTIVITIES OF DAILY LIVING (ADL)
ADLS_ACUITY_SCORE: 26
ADLS_ACUITY_SCORE: 24
ADLS_ACUITY_SCORE: 26
ADLS_ACUITY_SCORE: 20
ADLS_ACUITY_SCORE: 26
ADLS_ACUITY_SCORE: 26
ADLS_ACUITY_SCORE: 20
ADLS_ACUITY_SCORE: 26
ADLS_ACUITY_SCORE: 20

## 2024-01-02 NOTE — PROGRESS NOTES
Patient complaining of intermittent dizziness. BP 95//62. IV fluids started at 1008. Encouraged fluid intake.

## 2024-01-02 NOTE — OP NOTE
[facility]  Section Operative Note    Patient Name: Gloria Rodríguez  MRN:[id number]  : 1990  Date of Surgery: 24   Pre-operative diagnosis:  1. IUP at 39w3d  2. Hx of prior  x2, planned repeat   Post-operative diagnosis:  Same, thin lower uterine segment   Procedure:  Repeat  section via pfannenstiel skin incision with double layer uterine closure   Surgeon:  Dr. Donna Castelan (OB/GYN Attending Staff)    Assistant(s):  none    Anesthesia:  Spinal    Quantitative blood loss:  637 mL    Total IV fluids:  See anesthesia record   Drains:  Feng catheter   Specimens:  none   Findings:  Moderate adhesions of the abdominal wall/rectus. Filmy adhesion of the uterus to the peritoneum. LUISITO 5mm. Clear fluid with amniotomy. Liveborn, vigorous female infant born vertex, LOP. APGARS 9 and 9. Weight: 8lbs 10oz. Placenta appeared intact with a three vessel cord and no apparent gross pathology. Uterus appears normal and clear of any retained product. Hemostatic surgical site at the end of the case.    Complications:  None apparent    Condition:  Stable, transferred to PACU    Indication: Gloria Rodríguez is a 33 year old  at 39w3d by 10w6d US who presents for scheduled repeat  section. The steps of the procedure were reviewed as well as the risks of bleeding, infection and intraabdominal and fetal injury were discussed. She is agreeable to a blood transfusion and written informed consent was signed.       Procedure:  After obtaining informed consent, the patient was taken to the operating room. She received 2 grams of ancef prior to the skin incision. She was placed in the dorsal supine position with a leftward tilt and prepped and draped in the usual sterile fashion. Following test of adequate spinal anesthesia, the abdomen was entered through a transverse skin incision through her previous scar. The skin incision was made sharply and carried through the  subcutaneous tissue to the fascia.  Fascia was incised in the midline and extended laterally with the Orellana scissors. The superior margin of the fascial incision was grasped with Kocher clamps and dissected from the underlying muscle sharp and blunt dissecton, which was then repeated at the lower margin of the fascial incision.  The muscle was  in the midline. The peritoneum was entered bluntly and the opening extended by digital dissection with care to avoid the bladder. An alfred-o retractor was placed. The lower segment of the uterus was opened sharply in a transverse fashion and extended with digital pressure. The infant's head was elevated to the level of the hysterotomy and was delivered atraumatically. The cord was doubly clamped and cut and the infant was handed off to the waiting nursing staff after 1 minute of delayed cord clamping. A segment of the cord was cut and set aside for cord gases if needed. The placenta was extracted via cord traction. The uterus was cleared of all clots and debris. Oxytocin was given through the running IV. With vigorous massage as well as administration of oxytocin, good uterine tone was achieved. The hysterotomy was repaired with 0-vicryl suture in a running locked fashion. A 2nd layer of 0-monocryl was used to imbricate the incision and good hemostasis was achieved. The hysterotomy was irrigated, inspected and found to have no active sites of bleeding. The abdominal wall was examined and found to have no active sites of bleeding. The fascia was closed with a running suture of 0-vicryl. Subcutaneous tissue was irrigated. Areas that were oozing were controlled with cautery. The subcutaneous tissue was closed with interrupted plain gut. The skin was closed with 4-0 monocryl. The patient tolerated the procedure well and was taken to the recovery room in stable condition. All sponge, needle and instrument counts were correct x2.      Donna Castelan MD   OB/GYN

## 2024-01-02 NOTE — ANESTHESIA PROCEDURE NOTES
"Lumbar puncture Procedure Note    Pre-Procedure   Staff -        Anesthesiologist:  Corby Aranda MD       CRNA: Manjinder Santos APRN CRNA       Other Anesthesia Staff: Trixie Rivera       Performed By: PRISCILLA and CRNA       Location: OR       Procedure Start/Stop Times: 1/2/2024 7:32 AM and 1/2/2024 7:36 AM       Pre-Anesthestic Checklist: patient identified, IV checked, risks and benefits discussed, informed consent, monitors and equipment checked, pre-op evaluation, at physician/surgeon's request and post-op pain management  Timeout:       Correct Patient: Yes        Correct Procedure: Yes        Correct Site: Yes        Correct Position: Yes   Procedure Documentation  Procedure: lumbar puncture       Patient Position: sitting       Skin prep: Chloraprep       Insertion Site: L2-3. (midline approach).       Needle Gauge: 25.        Needle Length (Inches): 3.5        Spinal Needle Type: Pencan       Introducer used       # of attempts: 1 and  # of redirects:  0    Assessment/Narrative         Paresthesias: No.       CSF fluid: clear.       Opening pressure was cmH2O while  Sitting.      Medication(s) Administered   Medication Administration Time: 1/2/2024 7:32 AM     Comments:          FOR [facility] ([facility]) ONLY:   Pain Team Contact information: please page the Pain Team Via [employer]. Search \"Pain\". During daytime hours, please page the attending first. At night please page the resident first.      "

## 2024-01-02 NOTE — H&P
[facility] Labor and Delivery H&P  2024  Gloria Rodríguez  [id number]      HPI: Gloria Rodríguez is a 33 year old  at 39w3d by 10w6d US who presents for scheduled repeat  section.     She states that she is feeling well today.    Pregnancy notable for:  --Hx of 2x c/s, planned repeat, anterior placenta   --placenta previa - resolved  --Hx of gestational, normal testing this pregnancy   --BMI 38  --hx of anxiety and depression, no medications    OBHX:   OB History    Para Term  AB Living   5 2 2 0 2 2   SAB IAB Ectopic Multiple Live Births   0 0 0 0 2      # Outcome Date GA Lbr Candelario/2nd Weight Sex Delivery Anes PTL Lv   5 Current            4 Term 18 40w0d  3.742 kg (8 lb 4 oz) F CS-Unspec   FREDDIE      Name: Tessa   3 AB      IAB      2 Term 16 38w0d  3.402 kg (7 lb 8 oz) M CS-Unspec   FREDDIE      Birth Comments: jaundice      Complications: Fetal Intolerance      Name: Burak   1 AB      IAB          MedicalHX:   Past Medical History:   Diagnosis Date    Anxiety     Depression     Gestational diabetes mellitus 2018    NO ACTIVE PROBLEMS     Postpartum depression 2016    PTSD (post-traumatic stress disorder)        SurgicalHX:   Past Surgical History:   Procedure Laterality Date     SECTION      ,    PILONIDAL CYST / SINUS EXCISION         Medications:   No current facility-administered medications on file prior to encounter.  loratadine (CLARITIN) 10 MG tablet, Take 10 mg by mouth daily  Prenatal Vit-Fe Fumarate-FA (PRENATAL MULTIVITAMIN W/IRON) 27-0.8 MG tablet, Take 1 tablet by mouth daily        Allergies:  Allergies   Allergen Reactions    Banana Hives    Fruit Extracts      Bananas, itchy mouth    Ibuprofen Sodium Hives    Oseltamivir      Other reaction(s): Tremors    Sulfa Antibiotics Hives    Other [No Clinical Screening - See Comments] Rash     bleach    Silver Rash    Sodium Hypochlorite Other (See Comments) and Rash        FamilyHX:    Family History   Problem Relation Age of Onset    Depression Mother     Substance Abuse Mother         Alcoholic    Kidney Disease Mother     Bipolar Disorder Mother     Ulcers Mother         stomach    Hyperlipidemia Father     Hypertension Paternal Grandmother     Thrombosis Paternal Grandfather     Thyroid Disease Paternal Grandfather        SocialHX:   Social History     Socioeconomic History    Marital status: Single     Spouse name: None    Number of children: None    Years of education: None    Highest education level: None   Tobacco Use    Smoking status: Never    Smokeless tobacco: Never   Vaping Use    Vaping Use: Never used   Substance and Sexual Activity    Alcohol use: No    Drug use: Yes     Comment: medical marijuana- quit with pregnancy    Sexual activity: Yes     Partners: Male     Birth control/protection: None   Other Topics Concern    Parent/sibling w/ CABG, MI or angioplasty before 65F 55M? No    Bike Helmet Yes     Comment: advised to wear     Seat Belt Yes    Self-Exams Yes     Comment: advised to do once a month       ROS: 10-point ROS negative except as in HPI     Physical Exam:  Vitals:    01/02/24 0702   BP: 138/79   BP Location: Right arm   Patient Position: Semi-Riley's   Cuff Size: Adult Regular   Resp: 16   Temp: 98  F (36.7  C)   TempSrc: Oral     GEN: resting comfortably in bed, NAD   CV: Reg rate, warm and well-perfused  PULM: no increased work of breathing, no cough/wheeze   ABD: soft, gravid, non-tender, non-distended  EXT: trace edema, non-tender to palpation  CVX: deferred  Presentation: cephalic by leopold's  EFW: 9 lbs  Membranes: intact    NST:  FHT: baseline nl, mod variability, + accels, no decels  TOCO: none    Labs:    Lab Results   Component Value Date    AS Negative 06/16/2023    HEPBANG Nonreactive 06/16/2023    CHPCRT  11/20/2013     Negative   Negative for C. trachomatis rRNA by transcription mediated amplification.   A negative result by  "transcription mediated amplification does not preclude the   presence of C. trachomatis infection because results are dependent on proper   and adequate collection, absence of inhibitors, and sufficient rRNA to be   detected.    GCPCRT  11/15/2012     Negative for N. gonorrhoeae rRNA by transcription mediated amplification.   A negative result by transcription mediated amplification does not preclude the   presence of N. gonorrhoeae infection because results are dependent on proper   and adequate collection, absence of inhibitors, and sufficient rRNA to be   detected.    HGB 13.0 2024       GBS Status:   No results found for: \"GBS\"    Lab Results   Component Value Date    PAP NIL 11/15/2012       A/P: Gloria Rodríguez is a 33 year old  at 39w3d by 10w6d US who presents for scheduled repeat  section. The steps of the procedure were reviewed as well as the risks of bleeding, infection and intraabdominal and fetal injury were discussed. She is agreeable to a blood transfusion and written informed consent was signed.     Admit to L&D. Place PIV. Draw labs: T&S, CBC, RPR.   FWB: Category 1 FH  PNC: Rh POS, Rubella immune, GBS neg    Donna Castelan MD  OB/GYN    "

## 2024-01-02 NOTE — PROGRESS NOTES
"Patient declined attempt to get out of bed/ sitting on side of bed. Patient sat up in bed and stated, \"I feel like I need to eat now before I get up.\" Food and fluids given. Vitals stable. Stated to patient nurse will check back after she eats a snack.   "

## 2024-01-02 NOTE — ANESTHESIA POSTPROCEDURE EVALUATION
Patient: Gloria Rodríguez    Procedure: Procedure(s):   DELIVERY       Anesthesia Type:  Spinal    Note:  Disposition: Inpatient   Postop Pain Control: Uneventful            Sign Out: Well controlled pain   PONV: No   Neuro/Psych: Uneventful            Sign Out: Acceptable/Baseline neuro status   Airway/Respiratory: Uneventful            Sign Out: Acceptable/Baseline resp. status   CV/Hemodynamics: Uneventful            Sign Out: Acceptable CV status; No obvious hypovolemia; No obvious fluid overload   Other NRE: NONE   DID A NON-ROUTINE EVENT OCCUR? No           Last vitals:  Vitals Value Taken Time   BP 97/63 24 1000   Temp 36.6  C (97.8  F) 24 0915   Pulse 77 24 1000   Resp     SpO2 97 % 24 1009   Vitals shown include unfiled device data.    Electronically Signed By: MACARENA Fong CRNA  2024  10:10 AM

## 2024-01-02 NOTE — PROGRESS NOTES
"S:Delivery  B:No Labor,39w3d    No results found for: \"GBS\" with antibiotic treatment not indicated 4 hours prior to delivery.  A: Patient delivered Repeat C/S at 0801 with Dr. YESSICA Castelan in attendance and baby placed on mother's abdomen for delayed cord clamping. Baby received from surgeon. Baby to warmer for assessment/resusitative efforts.. Placed skin to skin @ 0853.. Apgars 9/9.Delivery .  IV infusion of Oxytocin  infused. Placenta removal spontaneous. MD does not want placenta sent to pathology.  See Flowsheet for VS and PP checks.  .  Labor care plan goals met, transition now to postpartum care. Postpartum QBL 67mL  R: Expect routine postpartum care. Anticipate first feeding within the hour or whenever infant displays feeding cues. Continue skin to skin. Prior discussion with mother indicates that feeding plan is Breast feeding . Educated mother on importance of exclusive breastfeeding, expected feeding readiness cues and encouraged her to observe for these cues while rooming in. Informed her that breastfeeding assistance would be provided.  "

## 2024-01-02 NOTE — ANESTHESIA CARE TRANSFER NOTE
Patient: Gloria VILLAFANA Marcos    Procedure: Procedure(s):   DELIVERY       Diagnosis: Previous  delivery, antepartum condition or complication [O34.219]  Diagnosis Additional Information: No value filed.    Anesthesia Type:   Spinal     Note:    Oropharynx: oropharynx clear of all foreign objects  Level of Consciousness: awake  Oxygen Supplementation: room air    Independent Airway: airway patency satisfactory and stable  Dentition: dentition unchanged  Vital Signs Stable: post-procedure vital signs reviewed and stable  Report to RN Given: handoff report given  Patient transferred to: Labor and Delivery    Handoff Report: Identifed the Patient, Identified the Reponsible Provider, Reviewed the pertinent medical history, Discussed the surgical course, Reviewed Intra-OP anesthesia mangement and issues during anesthesia, Set expectations for post-procedure period and Allowed opportunity for questions and acknowledgement of understanding      Vitals:  Vitals Value Taken Time   /55 24 0845   Temp     Pulse 86 24 0845   Resp     SpO2 100 % 24 0846   Vitals shown include unfiled device data.    Electronically Signed By: Trixie Belcher  2024  8:46 AM

## 2024-01-02 NOTE — PROGRESS NOTES
Pt arrived for scheduled repeat  at 0608, accompanied by significant other. IV placed, labs drawn, IV  fluid bolus initiated, received Tylenol and Bicitra. Cat 1 FHT. Report given to oncoming RN's T[place].     Brigitte Mccann RN on 2024 at 6:58 AM

## 2024-01-03 LAB
ERYTHROCYTE [DISTWIDTH] IN BLOOD BY AUTOMATED COUNT: 13.9 % (ref 10–15)
HCT VFR BLD AUTO: 32.8 % (ref 35–47)
HGB BLD-MCNC: 10.8 G/DL (ref 11.7–15.7)
HOLD SPECIMEN: NORMAL
MCH RBC QN AUTO: 29.9 PG (ref 26.5–33)
MCHC RBC AUTO-ENTMCNC: 32.9 G/DL (ref 31.5–36.5)
MCV RBC AUTO: 91 FL (ref 78–100)
PLATELET # BLD AUTO: 257 10E3/UL (ref 150–450)
RBC # BLD AUTO: 3.61 10E6/UL (ref 3.8–5.2)
WBC # BLD AUTO: 9.5 10E3/UL (ref 4–11)

## 2024-01-03 PROCEDURE — 120N000001 HC R&B MED SURG/OB

## 2024-01-03 PROCEDURE — 250N000011 HC RX IP 250 OP 636: Performed by: OBSTETRICS & GYNECOLOGY

## 2024-01-03 PROCEDURE — 36415 COLL VENOUS BLD VENIPUNCTURE: CPT | Performed by: OBSTETRICS & GYNECOLOGY

## 2024-01-03 PROCEDURE — 85027 COMPLETE CBC AUTOMATED: CPT | Performed by: OBSTETRICS & GYNECOLOGY

## 2024-01-03 PROCEDURE — 250N000013 HC RX MED GY IP 250 OP 250 PS 637: Performed by: OBSTETRICS & GYNECOLOGY

## 2024-01-03 RX ADMIN — ACETAMINOPHEN 975 MG: 325 TABLET, FILM COATED ORAL at 06:17

## 2024-01-03 RX ADMIN — ENOXAPARIN SODIUM 40 MG: 100 INJECTION SUBCUTANEOUS at 08:56

## 2024-01-03 RX ADMIN — ACETAMINOPHEN 975 MG: 325 TABLET, FILM COATED ORAL at 18:16

## 2024-01-03 RX ADMIN — ACETAMINOPHEN 975 MG: 325 TABLET, FILM COATED ORAL at 12:42

## 2024-01-03 RX ADMIN — LORATADINE 10 MG: 10 TABLET ORAL at 08:56

## 2024-01-03 RX ADMIN — MICONAZOLE NITRATE ANTIFUNGAL POWDER: 2 POWDER TOPICAL at 20:30

## 2024-01-03 RX ADMIN — SENNOSIDES AND DOCUSATE SODIUM 1 TABLET: 8.6; 5 TABLET ORAL at 08:56

## 2024-01-03 RX ADMIN — ACETAMINOPHEN 975 MG: 325 TABLET, FILM COATED ORAL at 23:59

## 2024-01-03 RX ADMIN — MICONAZOLE NITRATE ANTIFUNGAL POWDER: 2 POWDER TOPICAL at 08:56

## 2024-01-03 ASSESSMENT — ACTIVITIES OF DAILY LIVING (ADL)
ADLS_ACUITY_SCORE: 22

## 2024-01-03 NOTE — PROGRESS NOTES
Data: Vital signs within normal limits. Postpartum checks within normal limits - see flow record. Patient  Is tolerating po intake. Patient has not voided since delivery and will call for assistance. Attempted to void X2. Bladder scanned for 137.. Patient instructed to call for assist when up.  No apparent signs of infection. Incision healing well. Positive attachment behaviors are observed with infant. Support persons are present. Father of baby stated he will be going home overnight tonight.  Action:  Pain plan was discussed. Pain meds, post  are scheduled and will be brought in when they are due.  Additional narcotics will be administered prn. Patient declined PRN oxy but did accept ice for incision. Patient education done about breastfeeding,  cares, postpartum cares, pain management/plan, fall risk,  safety, rest, and discharge from hospital. See flow record.  Response:   Patient reassessed within 1 hour after each medication for pain. Patient stated that pain had improved. Patient stated that she was comfortable. .

## 2024-01-03 NOTE — PROGRESS NOTES
[facility] OB/GYN Postpartum Note    S: Incisional pain is well controlled with current pain meds. However, left sided sciatica never pain is bothering her. Lochia minimal. Eating and drinking without nausea/vomiting.  Not yet passing flatus. Ambulating without lightheadedness or dizziness.  Voiding without difficulty.      O:  Patient Vitals for the past 12 hrs:   BP Temp Temp src Pulse Resp SpO2   24 0743 121/68 98.3  F (36.8  C) Oral 88 16 --   24 0400 119/70 97.8  F (36.6  C) Oral 85 16 99 %   24 2351 122/65 98.5  F (36.9  C) Oral 75 16 --   24 2043 124/62 98.5  F (36.9  C) Oral 80 16 97 %     Gen:  NAD  CV: Well perfused  Resp: Normal respiratory efforts  Abd: soft, nondistended, nontender, fundus firm at 2cm below umbilicus  Incision: pfannenstiel skin incision clean, dry, intact   Ext: non-tender, trace edema, no erythema    Hemoglobin   Date Value Ref Range Status   2024 10.8 (L) 11.7 - 15.7 g/dL Final   2024 13.0 11.7 - 15.7 g/dL Final     A/P: 33 year old  POD#1 s/p RLTCS.    Routine postpartum care: continue to work on pain control, ambulation, and bowel movement    Left sided sciatica never pain: if symptoms persists, will place PT referral.    BMI 38.53: continue lovenox 40mg q24hr (scheduled to be given at 1/3 0800) and SCD for DVT ppx while hospitalized.     H/o anxiety/depression: currently doing well in therapy.    Gestational hypertension: diagnosed postpartum with BPs 138/96 ( 0900) and bedside BP of 144/90s. Will continue to monitor BP. Will discharge home with BP cuff.    Uncomplicated acute blood loss anemia from appropriate blood loss during surgery: No s/s of ongoing blood loss. Continue to monitor vitals.     Dispo: anticipate to discharge home      Manju Nunes MD  Obstetrics and Gynecology  [facility]   2024 8:28 AM

## 2024-01-03 NOTE — PLAN OF CARE
Goal Outcome Evaluation:      Plan of Care Reviewed With: patient    Data: Vital signs within normal limits. Postpartum checks within normal limits - see flow record. Patient  Is tolerating po intake. Patient is able to empty bladder independently. . Patient ambulating independently..   No apparent signs of infection. Incision healing well. Patient Is performing self cares and Is able to care for infant. Positive attachment behaviors are observed with infant. Support persons are present.  Action:  Pain plan was discussed. Pain meds, post  are scheduled and will be brought in when they are due. Patient has an allergy to ibuprofen so is only receiving scheduled tylenol at this time. Patient reports pain control. Additional narcotics will be administered prn. Patient was medicated during the shift for pain. See MAR.Patient education done about hand hygiene, breastfeeding, formula feeding,  cares, postpartum cares, pain management/plan, fall risk,  safety, rest, and discharge from hospital. See flow record.  Response:   Patient reassessed within 1 hour after each medication for pain. Patient stated that pain had improved. Patient stated that she was comfortable. .   Plan: Anticipate discharge on 24.    Efrain Vasquez RN on 1/3/2024 at 4:45 AM

## 2024-01-03 NOTE — PLAN OF CARE
Data: Vital signs within normal limits. Postpartum checks within normal limits - see flow record. Patient eating and drinking normally. Patient able to empty bladder independently and is up ambulating. No apparent signs of infection. Incision healing well. Patient performing self cares and is able to care for infant.  Action: Patient medicated during the shift for pain. See MAR. Patient reassessed within 1 hour after each medication and pain was improved - patient stated she was comfortable. Patient education done about infant cares, bath, breast feeding. See flow record.  Response: Positive attachment behaviors observed with infant. Support persons were  present.   Plan: Anticipate discharge on 1/4/23.

## 2024-01-04 VITALS
RESPIRATION RATE: 16 BRPM | SYSTOLIC BLOOD PRESSURE: 136 MMHG | WEIGHT: 203.1 LBS | HEART RATE: 93 BPM | OXYGEN SATURATION: 97 % | BODY MASS INDEX: 35.98 KG/M2 | TEMPERATURE: 98.4 F | DIASTOLIC BLOOD PRESSURE: 68 MMHG

## 2024-01-04 PROBLEM — O13.9 GESTATIONAL HYPERTENSION: Status: ACTIVE | Noted: 2024-01-04

## 2024-01-04 PROBLEM — D62 ANEMIA DUE TO BLOOD LOSS, ACUTE: Status: ACTIVE | Noted: 2024-01-04

## 2024-01-04 PROBLEM — Z98.891 S/P REPEAT LOW TRANSVERSE C-SECTION: Status: ACTIVE | Noted: 2024-01-02

## 2024-01-04 PROCEDURE — 250N000011 HC RX IP 250 OP 636: Performed by: OBSTETRICS & GYNECOLOGY

## 2024-01-04 PROCEDURE — 250N000013 HC RX MED GY IP 250 OP 250 PS 637: Performed by: OBSTETRICS & GYNECOLOGY

## 2024-01-04 RX ORDER — OXYCODONE HYDROCHLORIDE 5 MG/1
5 TABLET ORAL EVERY 6 HOURS PRN
Qty: 10 TABLET | Refills: 0 | Status: SHIPPED | OUTPATIENT
Start: 2024-01-04 | End: 2024-02-14

## 2024-01-04 RX ORDER — AMOXICILLIN 250 MG
1 CAPSULE ORAL 2 TIMES DAILY PRN
Qty: 30 TABLET | Refills: 1 | Status: SHIPPED | OUTPATIENT
Start: 2024-01-04 | End: 2024-02-14

## 2024-01-04 RX ADMIN — MICONAZOLE NITRATE ANTIFUNGAL POWDER: 2 POWDER TOPICAL at 08:22

## 2024-01-04 RX ADMIN — ACETAMINOPHEN 975 MG: 325 TABLET, FILM COATED ORAL at 06:17

## 2024-01-04 RX ADMIN — ENOXAPARIN SODIUM 40 MG: 100 INJECTION SUBCUTANEOUS at 08:19

## 2024-01-04 RX ADMIN — LORATADINE 10 MG: 10 TABLET ORAL at 09:30

## 2024-01-04 RX ADMIN — SENNOSIDES AND DOCUSATE SODIUM 1 TABLET: 8.6; 5 TABLET ORAL at 08:19

## 2024-01-04 ASSESSMENT — ACTIVITIES OF DAILY LIVING (ADL)
ADLS_ACUITY_SCORE: 22
ADLS_ACUITY_SCORE: 20
ADLS_ACUITY_SCORE: 22

## 2024-01-04 NOTE — PROGRESS NOTES
[facility] OB/GYN Department    Post-Partum Progress Note: POD #2    Name: Gloria VILLAFANA Marcos  Date: 2024    Subjective:   Patient seen and examined.  No complaints today.  Pain well controlled.  Tolerating regular diet, without nausea or vomiting.  Ambulating without difficulty.  Feng removed post operatively, voiding spontaneously. + flatus, no BM yet. Breast feeding.     ROS:    General/Constitutional:  Denies chills or fever  Respiratory: Denies shortness of breath  Cardiovascular: Denies chest pain  Gastrointestinal:  +mild uterine cramping, no nausea or vomiting, + flatus, no BM  Genitourinary: Denies difficulty urinating  Musculoskeletal: minimal peripheral edema      Objective:     Intake/Output Summary (Last 24 hours) at 2024 0933  Last data filed at 1/3/2024 2254  Gross per 24 hour   Intake 2300 ml   Output --   Net 2300 ml       Patient Vitals for the past 24 hrs:   BP Temp Temp src Pulse Resp SpO2 Weight   24 1003 -- -- -- -- -- -- 92.1 kg (203 lb 1.6 oz)   24 0934 136/68 98.4  F (36.9  C) Oral 93 -- 97 % --   24 0403 128/76 98.5  F (36.9  C) Oral 92 16 97 % --   24 0006 133/73 97.7  F (36.5  C) Oral 75 16 97 % --   24 2035 129/74 98.8  F (37.1  C) Oral -- 16 97 % --       Recent Labs   Lab 24  0531 24  0655   HGB 10.8* 13.0         General appearance: well-hydrated, A&O x 3, no apparent distress  ENT: EOMI, sclera anicteric   Lungs: Equal expansion bilaterally, no accessory muscle use  Heart: No heaves or thrills. No peripheral varicosities  Constitutional: See vitals  Abdomen: Soft, non-distended, no rebound or rigidity. Incision c/d/i, uterus firm below umbilicus with non-tender fundus   Neurologic: CN II-XII grossly intact, no lateralizing defects, no gross movement abnormalities  Extremities: non pitting pedal edema, no calf tenderness      Assessment and Plan:    S/P repeat low transverse   POD#2 s/p RLTCS   Routine post operative  care  Encourage ambulation  Optimize pain management  Lactation support  Anticipate discharge home today      Gestational hypertension  2 elevated blood pressures during admission technically meeting diagnosis of gestational hypertension.  Blood pressure has been normal over the last 24 hours.  Will plan to discharge with outpatient follow-up for nurse visit for blood pressure check in the next 1 to 2 weeks.    Anemia due to blood loss, acute  Hemoglobin drop appropriate for intraoperative loss  Patient asymptomatic  Continue prenatal vitamin with iron    Makeda Johnston DO

## 2024-01-04 NOTE — ASSESSMENT & PLAN NOTE
2 elevated blood pressures during admission technically meeting diagnosis of gestational hypertension.  Blood pressure has been normal over the last 24 hours.  Will plan to discharge with outpatient follow-up for nurse visit for blood pressure check in the next 1 to 2 weeks.

## 2024-01-04 NOTE — PLAN OF CARE
VSS, pt walking independently and tolerating intake. Patient discharged per wheelchair with infant in car seat. Mother verified that her band matches her infant's band by comparing the infant's  MR#.  Discharge instructions given and pt verbalized understanding. Encouraged to call for any problems, questions or concerns. RXs were sent to home pharmacy .

## 2024-01-04 NOTE — PLAN OF CARE
Goal Outcome Evaluation:      Plan of Care Reviewed With: patient    Data: Vital signs within normal limits. Postpartum checks within normal limits - see flow record. Patient  Is tolerating po intake. Patient is able to empty bladder independently. . Patient ambulating independently..   No apparent signs of infection. Incision healing well. Patient Is performing self cares and Is able to care for infant. Positive attachment behaviors are observed with infant. Support persons are present.  Action:  Pain plan was discussed. Pain meds, post  are scheduled and will be brought in when they are due. Patient reporting sciatic pain in her hip.Additional narcotics will be administered prn. Patient was medicated during the shift for pain. See MAR.Patient education done about hand hygiene, breastfeeding, formula feeding,  cares, postpartum cares, pain management/plan, fall risk,  safety, rest, and discharge from hospital. See flow record.  Response:   Patient reassessed within 1 hour after each medication for pain. Patient stated that pain had improved. Patient stated that she was comfortable. .   Plan: Anticipate discharge on 24.    Efrain Vasquez RN on 2024 at 4:42 AM

## 2024-01-04 NOTE — ASSESSMENT & PLAN NOTE
Hemoglobin drop appropriate for intraoperative loss  Patient asymptomatic  Continue prenatal vitamin with iron

## 2024-01-04 NOTE — ASSESSMENT & PLAN NOTE
POD#2 s/p RLTCS   Routine post operative care  Encourage ambulation  Optimize pain management  Lactation support  Anticipate discharge home today

## 2024-01-04 NOTE — DISCHARGE SUMMARY
[facility] Discharge Summary    Gloria Rodríguez MRN# [id number]   Age: 33 year old YOB: 1990     Date of Admission:  2024  Date of Discharge::  2024  Admitting Physician:  Donna Castelan MD  Discharge Physician:  Makeda Johnston DO     Home clinic: [facility]          Admission Diagnoses:   Previous  delivery, antepartum condition or complication [O34.219]  Postoperative state [Z98.890]          Discharge Diagnosis:     S/p repeat low transverse C section  Acute blood loss anenia          Procedures:     Procedure(s): Repeat low transverse  section       No other procedures performed during this admission           Medications Prior to Admission:     Medications Prior to Admission   Medication Sig Dispense Refill Last Dose    Acetaminophen (TYLENOL PO)        loratadine (CLARITIN) 10 MG tablet Take 10 mg by mouth daily       Prenatal Vit-Fe Fumarate-FA (PRENATAL MULTIVITAMIN W/IRON) 27-0.8 MG tablet Take 1 tablet by mouth daily                Discharge Medications:     Current Discharge Medication List        START taking these medications    Details   oxyCODONE (ROXICODONE) 5 MG tablet Take 1 tablet (5 mg) by mouth every 6 hours as needed for severe pain  Qty: 10 tablet, Refills: 0    Associated Diagnoses: S/P repeat low transverse       senna-docusate (SENOKOT-S/PERICOLACE) 8.6-50 MG tablet Take 1 tablet by mouth 2 times daily as needed for constipation  Qty: 30 tablet, Refills: 1    Associated Diagnoses: S/P repeat low transverse            CONTINUE these medications which have NOT CHANGED    Details   Acetaminophen (TYLENOL PO)       loratadine (CLARITIN) 10 MG tablet Take 10 mg by mouth daily      Prenatal Vit-Fe Fumarate-FA (PRENATAL MULTIVITAMIN W/IRON) 27-0.8 MG tablet Take 1 tablet by mouth daily                   Consultations:   No consultations were requested during this admission          Brief  History of Labor or Admission:       Gloria Rodríguez is a 33 year old  at 39w3d by 10w6d US who presents for scheduled repeat  section.    Surgical findings:  Moderate adhesions of the abdominal wall/rectus. Filmy adhesion of the uterus to the peritoneum. LUISITO 5mm. Clear fluid with amniotomy. Liveborn, vigorous female infant born vertex, LOP. APGARS 9 and 9. Weight: 8lbs 10oz. Placenta appeared intact with a three vessel cord and no apparent gross pathology. Uterus appears normal and clear of any retained product. Hemostatic surgical site at the end of the case.               Hospital Course:   The patient's hospital course was unremarkable.  She recovered as anticipated and experienced no post-operative complications.  On discharge, her pain was well controlled. Vaginal bleeding is similar to peak menstrual flow.  Voiding without difficulty.  Ambulating well and tolerating a normal diet.  No fever or significant wound drainage.  Breastfeeding well.  Infant is stable.  No bowel movement yet.  She was discharged on post-partum day #2.    Post-partum hemoglobin:   Hemoglobin   Date Value Ref Range Status   2024 10.8 (L) 11.7 - 15.7 g/dL Final             Discharge Instructions and Follow-Up:     Discharge diet: Regular   Discharge activity: No heavy lifting for 6 week(s)  No driving or operating machinery while on narcotic analgesics  Pelvic rest: abstain from intercourse and do not use tampons for 6 week(s)   Discharge follow-up: Follow up with OBGYN in 1-2 weeks for BP check, then 6 weeks post partum    Wound care: Ice to area for comfort           Discharge Disposition:     Discharged to home      Attestation:  I have reviewed today's vital signs, notes, medications, labs and imaging.    Makeda Johnston DO

## 2024-01-04 NOTE — DISCHARGE INSTRUCTIONS
Warning Signs after Having a Baby    Keep this paper on your fridge or somewhere else where you can see it.    Call your provider if you have any of these symptoms up to 12 weeks after having your baby.    Thoughts of hurting yourself or your baby  Pain in your chest or trouble breathing  Severe headache not helped by pain medicine  Eyesight concerns (blurry vision, seeing spots or flashes of light, other changes to eyesight)  Fainting, shaking or other signs of a seizure    Call 9-1-1 if you feel that it is an emergency.     The symptoms below can happen to anyone after giving birth. They can be very serious. Call your provider if you have any of these warning signs.    My provider s phone number: _______________________    Losing too much blood (hemorrhage)    Call your provider if you soak through a pad in less than an hour or pass blood clots bigger than a golf ball. These may be signs that you are bleeding too much.    Blood clots in the legs or lungs    After you give birth, your body naturally clots its blood to help prevent blood loss. Sometimes this increased clotting can happen in other areas of the body, like the legs or lungs. This can block your blood flow and be very dangerous.     Call your provider if you:  Have a red, swollen spot on the back of your leg that is warm or painful when you touch it.   Are coughing up blood.     Infection    Call your provider if you have any of these symptoms:  Fever of 100.4 F (38 C) or higher.  Pain or redness around your stitches if you had an incision.   Any yellow, white, or green fluid coming from places where you had stitches or surgery.    Mood Problems (postpartum depression)    Many people feel sad or have mood changes after having a baby. But for some people, these mood swings are worse.     Call your provider right away if you feel so anxious or nervous that you can't care for yourself or your baby.    Preeclampsia (high blood pressure)    Even if you  didn't have high blood pressure when you were pregnant, you are at risk for the high blood pressure disease called preeclampsia. This risk can last up to 12 weeks after giving birth.     Call your provider if you have:   Pain on your right side under your rib cage  Sudden swelling in the hands and face    Remember: You know your body. If something doesn't feel right, get medical help.     For informational purposes only. Not to replace the advice of your health care provider. Copyright 2020 [facility]. All rights reserved. Clinically reviewed by Rachell Lopez, RNC-OB, MSN. INMAN [id number] - Rev 02/23.

## 2024-01-05 ENCOUNTER — PATIENT OUTREACH (OUTPATIENT)
Dept: CARE COORDINATION | Facility: CLINIC | Age: 34
End: 2024-01-05

## 2024-01-05 ENCOUNTER — ALLIED HEALTH/NURSE VISIT (OUTPATIENT)
Dept: OBGYN | Facility: CLINIC | Age: 34
End: 2024-01-05
Payer: MEDICAID

## 2024-01-05 VITALS — SYSTOLIC BLOOD PRESSURE: 122 MMHG | DIASTOLIC BLOOD PRESSURE: 85 MMHG

## 2024-01-05 DIAGNOSIS — Z98.891 S/P REPEAT LOW TRANSVERSE C-SECTION: Primary | ICD-10-CM

## 2024-01-05 PROCEDURE — 99207 PR NO CHARGE NURSE ONLY: CPT

## 2024-01-05 NOTE — PROGRESS NOTES
Subjective:  Gloria is being seen in clinic for a blood pressure check due to  instance of elevated BP while hospitalized for delivery - no dx of htn . Patient is Postpartum.    Patient reports taking the following antihypertensive medications: no antihypertensive medications    Patient reports the following symptoms: no hypertension symptoms.     Objective:  LMP 04/07/2023 (Within Weeks)  Blood pressure taken on left arm; regular long cuff    Two blood pressures taken, at least one minute apart.   0927: 123/84  0929: 122/85    Plan:    BP is NORMAL (Systolic between 100-139 OR Diastolic between 60-89): OK for patient to leave clinic and rooming staff to route chart to ordering provider.     Signs and symptoms of hypertension reviewed with patient.     Continue routine postpartum care, Warning signs of hypertension reviewed, and Return to clinic for 2 and 6-week postpartum visit    CLARY Lopez  Ob/Gyn Clinic

## 2024-01-05 NOTE — PROGRESS NOTES
[facility]: [facility]    Background: Transitional Care Management program identified per system criteria and reviewed by [facility] team for possible outreach.    Assessment: Upon chart review, [facility] Team member will not proceed with patient outreach related to this episode of Transitional Care Management program due to reason below:    Patient has active communication with a nurse, provider or care team for reason of post-hospital follow up plan.  Outreach call by [facility] team not indicated to minimize duplicative efforts.     Plan: Transitional Care Management episode addressed appropriately per reason noted above.      Lidia Wade RN  [facility] [place], [facility]    *Connected Care Resource Team does NOT follow patient ongoing. Referrals are identified based on internal discharge reports and the outreach is to ensure patient has an understanding of their discharge instructions.

## 2024-02-02 ENCOUNTER — MEDICAL CORRESPONDENCE (OUTPATIENT)
Dept: HEALTH INFORMATION MANAGEMENT | Facility: CLINIC | Age: 34
End: 2024-02-02
Payer: COMMERCIAL

## 2024-02-14 ENCOUNTER — PRENATAL OFFICE VISIT (OUTPATIENT)
Dept: OBGYN | Facility: CLINIC | Age: 34
End: 2024-02-14
Payer: COMMERCIAL

## 2024-02-14 VITALS
SYSTOLIC BLOOD PRESSURE: 128 MMHG | BODY MASS INDEX: 36.14 KG/M2 | DIASTOLIC BLOOD PRESSURE: 81 MMHG | WEIGHT: 204 LBS | RESPIRATION RATE: 18 BRPM | TEMPERATURE: 98 F | HEART RATE: 103 BPM | HEIGHT: 63 IN

## 2024-02-14 DIAGNOSIS — Z98.891 S/P REPEAT LOW TRANSVERSE C-SECTION: Primary | ICD-10-CM

## 2024-02-14 DIAGNOSIS — Z30.011 BCP (BIRTH CONTROL PILLS) INITIATION: ICD-10-CM

## 2024-02-14 DIAGNOSIS — B37.2 YEAST INFECTION OF THE SKIN: ICD-10-CM

## 2024-02-14 PROBLEM — Z34.80 PRENATAL CARE, SUBSEQUENT PREGNANCY: Status: RESOLVED | Noted: 2017-10-06 | Resolved: 2024-02-14

## 2024-02-14 PROBLEM — Z36.89 ENCOUNTER FOR TRIAGE IN PREGNANT PATIENT: Status: RESOLVED | Noted: 2023-09-10 | Resolved: 2024-02-14

## 2024-02-14 PROCEDURE — 99213 OFFICE O/P EST LOW 20 MIN: CPT | Mod: 24 | Performed by: OBSTETRICS & GYNECOLOGY

## 2024-02-14 PROCEDURE — 99207 PR POST PARTUM EXAM: CPT | Performed by: OBSTETRICS & GYNECOLOGY

## 2024-02-14 RX ORDER — NYSTATIN 100000 U/G
CREAM TOPICAL 2 TIMES DAILY
Qty: 30 G | Refills: 3 | Status: SHIPPED | OUTPATIENT
Start: 2024-02-14

## 2024-02-14 RX ORDER — NORGESTIMATE AND ETHINYL ESTRADIOL 0.25-0.035
1 KIT ORAL DAILY
Qty: 84 TABLET | Refills: 4 | Status: SHIPPED | OUTPATIENT
Start: 2024-02-14 | End: 2024-07-10

## 2024-02-14 ASSESSMENT — ANXIETY QUESTIONNAIRES
7. FEELING AFRAID AS IF SOMETHING AWFUL MIGHT HAPPEN: SEVERAL DAYS
1. FEELING NERVOUS, ANXIOUS, OR ON EDGE: MORE THAN HALF THE DAYS
GAD7 TOTAL SCORE: 11
4. TROUBLE RELAXING: SEVERAL DAYS
3. WORRYING TOO MUCH ABOUT DIFFERENT THINGS: MORE THAN HALF THE DAYS
GAD7 TOTAL SCORE: 11
IF YOU CHECKED OFF ANY PROBLEMS ON THIS QUESTIONNAIRE, HOW DIFFICULT HAVE THESE PROBLEMS MADE IT FOR YOU TO DO YOUR WORK, TAKE CARE OF THINGS AT HOME, OR GET ALONG WITH OTHER PEOPLE: SOMEWHAT DIFFICULT
GAD7 TOTAL SCORE: 11
6. BECOMING EASILY ANNOYED OR IRRITABLE: MORE THAN HALF THE DAYS
7. FEELING AFRAID AS IF SOMETHING AWFUL MIGHT HAPPEN: SEVERAL DAYS
2. NOT BEING ABLE TO STOP OR CONTROL WORRYING: MORE THAN HALF THE DAYS
5. BEING SO RESTLESS THAT IT IS HARD TO SIT STILL: SEVERAL DAYS
8. IF YOU CHECKED OFF ANY PROBLEMS, HOW DIFFICULT HAVE THESE MADE IT FOR YOU TO DO YOUR WORK, TAKE CARE OF THINGS AT HOME, OR GET ALONG WITH OTHER PEOPLE?: SOMEWHAT DIFFICULT

## 2024-02-14 ASSESSMENT — PATIENT HEALTH QUESTIONNAIRE - PHQ9
SUM OF ALL RESPONSES TO PHQ QUESTIONS 1-9: 6
10. IF YOU CHECKED OFF ANY PROBLEMS, HOW DIFFICULT HAVE THESE PROBLEMS MADE IT FOR YOU TO DO YOUR WORK, TAKE CARE OF THINGS AT HOME, OR GET ALONG WITH OTHER PEOPLE: SOMEWHAT DIFFICULT
SUM OF ALL RESPONSES TO PHQ QUESTIONS 1-9: 6

## 2024-02-14 NOTE — NURSING NOTE
"Initial /81 (BP Location: Right arm, Patient Position: Sitting, Cuff Size: Adult Regular)   Pulse 103   Temp 98  F (36.7  C) (Tympanic)   Resp 18   Ht 1.6 m (5' 3\")   Wt 92.5 kg (204 lb)   LMP 04/07/2023 (Within Weeks)   Breastfeeding No   BMI 36.14 kg/m   Estimated body mass index is 36.14 kg/m  as calculated from the following:    Height as of this encounter: 1.6 m (5' 3\").    Weight as of this encounter: 92.5 kg (204 lb). .    "

## 2024-02-14 NOTE — PROGRESS NOTES
"[facility]    Post Partum Office Visit    CC: Post partum visit    HPI: Gloria Rodríguez is a 33 year old  who presents for a 6 week post-partum visit.  Patient delivered on 24, by Dr. Castelan at 39w3d gestation.  Patient delivered via repeat c/s, with no complications.    Patient doing well since delivery.  Breast-fed for 2 weeks and now is formula feeding.  Lochia has resolved.  Only complaint today is looser bowel movements over the past week.  She has not had any fevers or diarrhea.  Reports incision is well-healed.    Answers submitted by the patient for this visit:  Patient Health Questionnaire (Submitted on 2024)  If you checked off any problems, how difficult have these problems made it for you to do your work, take care of things at home, or get along with other people?: Somewhat difficult  PHQ9 TOTAL SCORE: 6  SHANNON-7 (Submitted on 2024)  SHANNON 7 TOTAL SCORE: 11    Reports stable mental health concerns, she sees a therapist weekly. Has a safety plan in place. She does not have thoughts of harm to self or others.      Information  Sex: female  Weight: 8 lbs. 10 oz.  Complications: none  Feeding Method:formula    Maternal Information  Postpartum Depression:denies increase in symptoms   Resumed [place]:yes, has used a condom  Last Pap Smear: 2021 NIL, HPV negative   Birth Control Method:oral contraceptives    ROS:  General/Constitutional:  Denies chills or fever  Gastrointestinal:  Denies abdominal pain, constipation, nausea, or vomiting  Genitourinary: Denies hematuria, difficulty urinating, frequency, or dysuria   Musculoskeletal: no peripheral edema  Psychiatric: Denies post partum depression    Physical Exam:   Vitals:    24 1004   BP: 128/81   BP Location: Right arm   Patient Position: Sitting   Cuff Size: Adult Regular   Pulse: 103   Resp: 18   Temp: 98  F (36.7  C)   TempSrc: Tympanic   Weight: 92.5 kg (204 lb)   Height: 1.6 m (5' 3\")    " "  Estimated body mass index is 36.14 kg/m  as calculated from the following:    Height as of this encounter: 1.6 m (5' 3\").    Weight as of this encounter: 92.5 kg (204 lb).    General appearance: well-hydrated, A&O x 3, no apparent distress  Lungs: Equal expansion bilaterally, no accessory muscle use  Heart: No heaves or thrills.   Abdomen: Soft, non-tender, non-distended. No rebound, rigidity, or guarding.  Extremities: no edema  Groin: Erythema in groin bilaterally, right worse than left.  Consistent with cutaneous yeast infection.      Assessment and Plan:     Encounter Diagnoses   Name Primary?    S/P repeat low transverse  Yes    BCP (birth control pills) initiation     Yeast infection of the skin        Appropriate post partum recovery.    Plan for OCPs for contraception.  Patient has been sexually active, reports condom use.  Patient seems to be having premenstrual symptoms.  Will plan to wait for 1 to 2 weeks and start birth control if she gets her menses. If no period, take UPT and then start OCPs if negative.    Patient complains of erythematous rash in her groin region.  Has been treated for cutaneous yeast infections in the past but reports symptoms have returned.  Exam is consistent with this, prescription for nystatin sent.    Plan for routine GYN cares, PAP smear due 2026.         Makeda Johnston DO      "

## 2024-03-05 ENCOUNTER — HOSPITAL ENCOUNTER (EMERGENCY)
Facility: CLINIC | Age: 34
Discharge: HOME | End: 2024-03-05
Attending: EMERGENCY MEDICINE | Admitting: EMERGENCY MEDICINE
Payer: COMMERCIAL

## 2024-03-05 ENCOUNTER — APPOINTMENT (OUTPATIENT)
Dept: CT IMAGING | Facility: CLINIC | Age: 34
End: 2024-03-05
Attending: EMERGENCY MEDICINE
Payer: COMMERCIAL

## 2024-03-05 VITALS
BODY MASS INDEX: 36.14 KG/M2 | TEMPERATURE: 97.7 F | DIASTOLIC BLOOD PRESSURE: 83 MMHG | SYSTOLIC BLOOD PRESSURE: 127 MMHG | WEIGHT: 204 LBS | HEIGHT: 63 IN | RESPIRATION RATE: 18 BRPM | HEART RATE: 79 BPM | OXYGEN SATURATION: 98 %

## 2024-03-05 DIAGNOSIS — R74.01 TRANSAMINITIS: ICD-10-CM

## 2024-03-05 DIAGNOSIS — K52.9 ENTERITIS: ICD-10-CM

## 2024-03-05 LAB
ALBUMIN SERPL BCG-MCNC: 4.2 G/DL (ref 3.5–5.2)
ALP SERPL-CCNC: 105 U/L (ref 40–150)
ALT SERPL W P-5'-P-CCNC: 53 U/L (ref 0–50)
ANION GAP SERPL CALCULATED.3IONS-SCNC: 12 MMOL/L (ref 7–15)
AST SERPL W P-5'-P-CCNC: 55 U/L (ref 0–45)
BASOPHILS # BLD AUTO: 0 10E3/UL (ref 0–0.2)
BASOPHILS NFR BLD AUTO: 0 %
BILIRUB SERPL-MCNC: 0.3 MG/DL
BUN SERPL-MCNC: 5.1 MG/DL (ref 6–20)
CALCIUM SERPL-MCNC: 9.1 MG/DL (ref 8.6–10)
CHLORIDE SERPL-SCNC: 105 MMOL/L (ref 98–107)
CREAT SERPL-MCNC: 0.77 MG/DL (ref 0.51–0.95)
DEPRECATED HCO3 PLAS-SCNC: 23 MMOL/L (ref 22–29)
EGFRCR SERPLBLD CKD-EPI 2021: >90 ML/MIN/1.73M2
EOSINOPHIL # BLD AUTO: 0.1 10E3/UL (ref 0–0.7)
EOSINOPHIL NFR BLD AUTO: 1 %
ERYTHROCYTE [DISTWIDTH] IN BLOOD BY AUTOMATED COUNT: 12.9 % (ref 10–15)
GLUCOSE SERPL-MCNC: 102 MG/DL (ref 70–99)
HCG SERPL QL: NEGATIVE
HCT VFR BLD AUTO: 47.2 % (ref 35–47)
HGB BLD-MCNC: 15.8 G/DL (ref 11.7–15.7)
HOLD SPECIMEN: NORMAL
HOLD SPECIMEN: NORMAL
IMM GRANULOCYTES # BLD: 0 10E3/UL
IMM GRANULOCYTES NFR BLD: 0 %
LYMPHOCYTES # BLD AUTO: 1 10E3/UL (ref 0–5.3)
LYMPHOCYTES NFR BLD AUTO: 18 %
MCH RBC QN AUTO: 28.8 PG (ref 26.5–33)
MCHC RBC AUTO-ENTMCNC: 33.5 G/DL (ref 31.5–36.5)
MCV RBC AUTO: 86 FL (ref 78–100)
MONOCYTES # BLD AUTO: 0.6 10E3/UL (ref 0–1.3)
MONOCYTES NFR BLD AUTO: 10 %
NEUTROPHILS # BLD AUTO: 4 10E3/UL (ref 1.6–8.3)
NEUTROPHILS NFR BLD AUTO: 70 %
NRBC # BLD AUTO: 0 10E3/UL
NRBC BLD AUTO-RTO: 0 /100
PLATELET # BLD AUTO: 368 10E3/UL (ref 150–450)
POTASSIUM SERPL-SCNC: 3.6 MMOL/L (ref 3.4–5.3)
PROT SERPL-MCNC: 8.1 G/DL (ref 6.4–8.3)
RBC # BLD AUTO: 5.49 10E6/UL (ref 3.8–5.2)
SODIUM SERPL-SCNC: 140 MMOL/L (ref 135–145)
WBC # BLD AUTO: 5.7 10E3/UL (ref 4–11)

## 2024-03-05 PROCEDURE — 84155 ASSAY OF PROTEIN SERUM: CPT | Performed by: EMERGENCY MEDICINE

## 2024-03-05 PROCEDURE — 250N000013 HC RX MED GY IP 250 OP 250 PS 637: Performed by: EMERGENCY MEDICINE

## 2024-03-05 PROCEDURE — 99285 EMERGENCY DEPT VISIT HI MDM: CPT | Mod: 25 | Performed by: EMERGENCY MEDICINE

## 2024-03-05 PROCEDURE — 74177 CT ABD & PELVIS W/CONTRAST: CPT

## 2024-03-05 PROCEDURE — 36415 COLL VENOUS BLD VENIPUNCTURE: CPT | Performed by: EMERGENCY MEDICINE

## 2024-03-05 PROCEDURE — 85041 AUTOMATED RBC COUNT: CPT | Performed by: EMERGENCY MEDICINE

## 2024-03-05 PROCEDURE — 258N000003 HC RX IP 258 OP 636: Performed by: EMERGENCY MEDICINE

## 2024-03-05 PROCEDURE — 85025 COMPLETE CBC W/AUTO DIFF WBC: CPT | Performed by: EMERGENCY MEDICINE

## 2024-03-05 PROCEDURE — 99284 EMERGENCY DEPT VISIT MOD MDM: CPT | Performed by: EMERGENCY MEDICINE

## 2024-03-05 PROCEDURE — 250N000009 HC RX 250: Performed by: EMERGENCY MEDICINE

## 2024-03-05 PROCEDURE — 96360 HYDRATION IV INFUSION INIT: CPT | Mod: 59 | Performed by: EMERGENCY MEDICINE

## 2024-03-05 PROCEDURE — 250N000011 HC RX IP 250 OP 636: Performed by: EMERGENCY MEDICINE

## 2024-03-05 PROCEDURE — 84075 ASSAY ALKALINE PHOSPHATASE: CPT | Performed by: EMERGENCY MEDICINE

## 2024-03-05 PROCEDURE — 84703 CHORIONIC GONADOTROPIN ASSAY: CPT | Performed by: EMERGENCY MEDICINE

## 2024-03-05 RX ORDER — IOPAMIDOL 755 MG/ML
100 INJECTION, SOLUTION INTRAVASCULAR ONCE
Status: COMPLETED | OUTPATIENT
Start: 2024-03-05 | End: 2024-03-05

## 2024-03-05 RX ORDER — LOPERAMIDE HCL 2 MG
2 CAPSULE ORAL 4 TIMES DAILY PRN
Status: DISCONTINUED | OUTPATIENT
Start: 2024-03-05 | End: 2024-03-05 | Stop reason: HOSPADM

## 2024-03-05 RX ADMIN — SODIUM CHLORIDE, POTASSIUM CHLORIDE, SODIUM LACTATE AND CALCIUM CHLORIDE 1000 ML: 600; 310; 30; 20 INJECTION, SOLUTION INTRAVENOUS at 13:12

## 2024-03-05 RX ADMIN — IOPAMIDOL 100 ML: 755 INJECTION, SOLUTION INTRAVENOUS at 12:44

## 2024-03-05 RX ADMIN — SODIUM CHLORIDE 65 ML: 9 INJECTION, SOLUTION INTRAVENOUS at 12:44

## 2024-03-05 RX ADMIN — LOPERAMIDE HYDROCHLORIDE 2 MG: 2 CAPSULE ORAL at 13:23

## 2024-03-05 ASSESSMENT — COLUMBIA-SUICIDE SEVERITY RATING SCALE - C-SSRS
2. HAVE YOU ACTUALLY HAD ANY THOUGHTS OF KILLING YOURSELF IN THE PAST MONTH?: NO
1. IN THE PAST MONTH, HAVE YOU WISHED YOU WERE DEAD OR WISHED YOU COULD GO TO SLEEP AND NOT WAKE UP?: NO
6. HAVE YOU EVER DONE ANYTHING, STARTED TO DO ANYTHING, OR PREPARED TO DO ANYTHING TO END YOUR LIFE?: NO

## 2024-03-05 ASSESSMENT — ACTIVITIES OF DAILY LIVING (ADL)
ADLS_ACUITY_SCORE: 35

## 2024-03-05 NOTE — ED PROVIDER NOTES
History     Chief Complaint   Patient presents with    Abdominal Pain    Diarrhea     HPI  Gloria Rodríguez is a 33 year old female with 3 days of loose watery stools now with left lower quadrant abdominal pain.  No nausea or vomiting.  Taking in liquids okay.  Not much of an appetite. Has not had symptoms like this before. No recent travel.  No known sick contacts.  No blood in stool or melena reported.  No UTI symptoms.  History of  section x 3 the most recently on 24.     The patient's PMHx, Surgical Hx, Allergies, and Medications were all reviewed with the patient.    Allergies:  Allergies   Allergen Reactions    Banana Hives    Fruit Extracts      Bananas, itchy mouth    Ibuprofen Sodium Hives    Oseltamivir      Other reaction(s): Tremors    Sulfa Antibiotics Hives    Other [No Clinical Screening - See Comments] Rash     bleach    Silver Rash    Sodium Hypochlorite Other (See Comments) and Rash       Problem List:    Patient Active Problem List    Diagnosis Date Noted    Gestational hypertension 2024     Priority: Medium    Anemia due to blood loss, acute 2024     Priority: Medium    S/P repeat low transverse  2024     Priority: Medium    Placenta previa in second trimester 2023     Priority: Medium    History of gestational diabetes 2023     Priority: Medium    History of  section 2018     Priority: Medium    Pilonidal cyst 2015     Priority: Medium    CARDIOVASCULAR SCREENING; LDL GOAL LESS THAN 130      Priority: Medium    NO ACTIVE PROBLEMS 11/15/2012     Priority: Medium        Past Medical History:    Past Medical History:   Diagnosis Date    Anxiety     Depression     Gestational diabetes mellitus 2018    NO ACTIVE PROBLEMS     Postpartum depression 2016    PTSD (post-traumatic stress disorder)        Past Surgical History:    Past Surgical History:   Procedure Laterality Date     SECTION      ,2018     SECTION  "N/A 2024    Procedure:  DELIVERY;  Surgeon: Donna Castelan MD;  Location: [facility] OR    PILONIDAL CYST / SINUS EXCISION         Family History:    Family History   Problem Relation Age of Onset    Depression Mother     Substance Abuse Mother         Alcoholic    Kidney Disease Mother     Bipolar Disorder Mother     Ulcers Mother         stomach    Hyperlipidemia Father     Hypertension Paternal Grandmother     Thrombosis Paternal Grandfather     Thyroid Disease Paternal Grandfather        Social History:  Marital Status:  Single [1]  Social History     Tobacco Use    Smoking status: Never    Smokeless tobacco: Never   Vaping Use    Vaping Use: Never used   Substance Use Topics    Alcohol use: No    Drug use: Yes     Comment: medical marijuana- quit with pregnancy        Medications:    Acetaminophen (TYLENOL PO)  loratadine (CLARITIN) 10 MG tablet  norgestimate-ethinyl estradiol (ORTHO-CYCLEN) 0.25-35 MG-MCG tablet  nystatin (MYCOSTATIN) 477232 UNIT/GM external cream  Prenatal Vit-Fe Fumarate-FA (PRENATAL MULTIVITAMIN W/IRON) 27-0.8 MG tablet          Review of Systems  Pertinent positives and negatives mentioned in HPI    Physical Exam   BP: 129/80  Pulse: 102  Temp: 97.7  F (36.5  C)  Resp: 18  Height: 160 cm (5' 3\")  Weight: 92.5 kg (204 lb)  SpO2: 97 %    GEN: Awake, alert, and cooperative. No acute distress.  CV : Extremities warm and well-perfused  PULM: Speaking in full sentences   ABD: Left lower quadrant tenderness palpation.  Left lower quadrant discomfort with palpation of left upper quadrant.  Soft and nondistended.. No rebound or guarding.   NEURO: Normal speech. Following commands. Answering questions and interacting appropriately.   EXT: No gross deformity. Warm and well perfused.  INT: Warm. No diaphoresis. Normal color.        ED Course        Procedures                 Critical Care time:  none               Results for orders placed or performed during the hospital encounter of " 03/05/24 (from the past 24 hour(s))   CBC with platelets, differential    Narrative    The following orders were created for panel order CBC with platelets, differential.  Procedure                               Abnormality         Status                     ---------                               -----------         ------                     CBC with platelets and d...[[id number]]  Abnormal            Final result                 Please view results for these tests on the individual orders.   Comprehensive metabolic panel   Result Value Ref Range    Sodium 140 135 - 145 mmol/L    Potassium 3.6 3.4 - 5.3 mmol/L    Carbon Dioxide (CO2) 23 22 - 29 mmol/L    Anion Gap 12 7 - 15 mmol/L    Urea Nitrogen 5.1 (L) 6.0 - 20.0 mg/dL    Creatinine 0.77 0.51 - 0.95 mg/dL    GFR Estimate >90 >60 mL/min/1.73m2    Calcium 9.1 8.6 - 10.0 mg/dL    Chloride 105 98 - 107 mmol/L    Glucose 102 (H) 70 - 99 mg/dL    Alkaline Phosphatase 105 40 - 150 U/L    AST 55 (H) 0 - 45 U/L    ALT 53 (H) 0 - 50 U/L    Protein Total 8.1 6.4 - 8.3 g/dL    Albumin 4.2 3.5 - 5.2 g/dL    Bilirubin Total 0.3 <=1.2 mg/dL   CBC with platelets and differential   Result Value Ref Range    WBC Count 5.7 4.0 - 11.0 10e3/uL    RBC Count 5.49 (H) 3.80 - 5.20 10e6/uL    Hemoglobin 15.8 (H) 11.7 - 15.7 g/dL    Hematocrit 47.2 (H) 35.0 - 47.0 %    MCV 86 78 - 100 fL    MCH 28.8 26.5 - 33.0 pg    MCHC 33.5 31.5 - 36.5 g/dL    RDW 12.9 10.0 - 15.0 %    Platelet Count 368 150 - 450 10e3/uL    % Neutrophils 70 %    % Lymphocytes 18 %    % Monocytes 10 %    % Eosinophils 1 %    % Basophils 0 %    % Immature Granulocytes 0 %    NRBCs per 100 WBC 0 <1 /100    Absolute Neutrophils 4.0 1.6 - 8.3 10e3/uL    Absolute Lymphocytes 1.0 0.0 - 5.3 10e3/uL    Absolute Monocytes 0.6 0.0 - 1.3 10e3/uL    Absolute Eosinophils 0.1 0.0 - 0.7 10e3/uL    Absolute Basophils 0.0 0.0 - 0.2 10e3/uL    Absolute Immature Granulocytes 0.0 <=0.4 10e3/uL    Absolute NRBCs 0.0 10e3/uL   [place] Draw     Narrative    The following orders were created for panel order [place] Draw.  Procedure                               Abnormality         Status                     ---------                               -----------         ------                     Extra Blue Top Tube[[id number]]                              Final result               Extra Red Top Tube[[id number]]                               Final result                 Please view results for these tests on the individual orders.   Extra Blue Top Tube   Result Value Ref Range    Hold Specimen JIC    Extra Red Top Tube   Result Value Ref Range    Hold Specimen JIC    HCG qualitative pregnancy (blood)   Result Value Ref Range    hCG Serum Qualitative Negative Negative   CT Abdomen Pelvis w Contrast    Narrative    CT ABDOMEN PELVIS W CONTRAST 3/5/2024 12:54 PM    CLINICAL HISTORY: three days of watery stools w/ LLQ pain, no melena  or hematochezia reported    TECHNIQUE: CT scan of the abdomen and pelvis was performed following  injection of IV contrast. Multiplanar reformats were obtained. Dose  reduction techniques were used.  CONTRAST: 100 mL Isovue 370    COMPARISON: None.    FINDINGS:   LOWER CHEST: Normal.    HEPATOBILIARY: Normal.    PANCREAS: Normal.    SPLEEN: Normal.    ADRENAL GLANDS: Normal.    KIDNEYS/BLADDER: Normal.    BOWEL: There is mildly increased fluid throughout the length of the  colon consistent with a history of diarrhea. No colonic wall  thickening. Small bowel loops are normal caliber. Stomach is  unremarkable. Normal appendix.    PELVIC ORGANS: The uterus is normal in size for age. No large adnexal  cysts or masses.    ADDITIONAL FINDINGS: No free fluid. No adenopathy. Normal caliber  abdominal aorta.    MUSCULOSKELETAL: Tiny fat-containing umbilical hernia without  incarceration. Osseous structures are unremarkable.      Impression    IMPRESSION:   1.  Increased fluid throughout the colon compatible the history of  diarrhea. This may  indicate an otherwise occult process such as an  enteritis.  2.  Otherwise, unremarkable CT of the abdomen and pelvis.    DOMO LIMON MD         SYSTEM ID:  [id number]       Medications   lactated ringers BOLUS 1,000 mL (1,000 mLs Intravenous $New Bag 3/5/24 1312)   loperamide (IMODIUM) capsule 2 mg (has no administration in time range)   iopamidol (ISOVUE-370) solution 100 mL (100 mLs Intravenous $Given 3/5/24 1244)   sodium chloride 0.9 % bag 500mL for CT scan flush use (65 mLs Intravenous $Given 3/5/24 1244)       Assessments & Plan (with Medical Decision Making)   33 year old female with 3 days of watery stools and 1 day of left-sided abdominal discomfort.  No blood or melena reported.  No fevers or chills.  No nausea or vomiting.  Reassuring vital signs.  Mild left lower quadrant tenderness on exam.  CBC without leukocytosis or anemia.  Hematocrit elevated slightly to 47.2 suggesting hemoconcentration.  Normal electrolytes.  Mild transaminitis ALT/ALT 55 and 53 respectively.  Unlikely contributory.  hCG negative.  CT scan of abdomen and pelvis shows increased fluid throughout the colon consistent with presentation of diarrhea likely underlying enteritis.  CT is otherwise unremarkable. Images reviewed personally as well as report from radiology which is detailed above. 1L LR and imodium given in ED. BRAT diet, lots of fluids and imodium at home. To follow up with PCP for transaminitis and if not feeling better by end of week.          I have reviewed the nursing notes.         New Prescriptions    No medications on file       Final diagnoses:   Enteritis   Transaminitis     Buddy Chatman MD        3/5/2024   [facility] EMERGENCY DEPT    Disclaimer: This note consists of words and symbols derived from keyboarding and dictation using voice recognition software.  As a result, there may be errors that have gone undetected.  Please consider this when interpreting information found in this  note.               Buddy Chatman MD  03/05/24 1751

## 2024-03-05 NOTE — ED TRIAGE NOTES
Patient reports N/V started on Saturday with abdominal cramping. Sx subsided yesterday evening but returned this morning. Now has LUQ pain. Worse with bowel movements. Denies vomiting or fevers. No blood in stool. Hx of 3 c-sections.      Triage Assessment (Adult)       Row Name 03/05/24 1106          Triage Assessment    Airway WDL WDL        Respiratory WDL    Respiratory WDL WDL        Skin Circulation/Temperature WDL    Skin Circulation/Temperature WDL WDL        Cardiac WDL    Cardiac WDL WDL        Peripheral/Neurovascular WDL    Peripheral Neurovascular WDL WDL        Cognitive/Neuro/Behavioral WDL    Cognitive/Neuro/Behavioral WDL WDL        [place] Coma Scale    Best Eye Response 4-->(E4) spontaneous     Best Motor Response 6-->(M6) obeys commands     Best Verbal Response 5-->(V5) oriented     [place] Coma Scale Score 15

## 2024-03-05 NOTE — DISCHARGE INSTRUCTIONS
BRAT diet as we discussed. Plenty of fluids to stay hydrated. May take imodium to help with diarrhea. Can take one tablet with each watery stool and a full glass of water. No more than 8 in a 24 hour period. Follow up with PCP if symptoms fail to improve in 4-5 days. If your symptoms worsen or you develop new or concerning symptoms, please return to the Emergency Department for further evaluation and treatment.      Your liver enzymes are mildly elevated. Follow up on this with your primary care provider.

## 2024-03-06 ENCOUNTER — PATIENT OUTREACH (OUTPATIENT)
Dept: FAMILY MEDICINE | Facility: CLINIC | Age: 34
End: 2024-03-06
Payer: COMMERCIAL

## 2024-03-06 ENCOUNTER — TELEPHONE (OUTPATIENT)
Dept: FAMILY MEDICINE | Facility: CLINIC | Age: 34
End: 2024-03-06
Payer: COMMERCIAL

## 2024-03-06 NOTE — TELEPHONE ENCOUNTER
"ED/Discharge Protocol    \"Hi, my name is Julie Behrendt, RN, a registered nurse, and I am calling on behalf of Bonny Hassan NP's office at [facility].  I am calling to follow up and see how things are going for you after your recent visit.\"    \"I see that you were in the (ER) on 3/5/24.    How are you doing now that you are home?\" Feeling much better, had great care at the ED.    Is patient experiencing symptoms that may require a hospital visit?  No.    Discharge Instructions    \"Let's review your discharge instructions.  What is/are the follow-up recommendations?  Pt. Response: BRAT diet and follow up with a provider in a week to recheck labs.    \"Were you instructed to make a follow-up appointment?\"  Pt. Response: Yes.  Has appointment been made?   No.  \"Can I help you schedule that appointment?\" Patient has moved to [place] and [facility] to be her primary care clinic. Appointment scheduled with Dr. Hammond on 3/11/24 at 1120 AM.      \"When you see the provider, I would recommend that you bring your discharge instructions with you.    Medications    \"How many new medications are you on since your hospitalization/ED visit?\"    0-1  \"How many of your current medicines changed (dose, timing, name, etc.) while you were in the hospital/ED visit?\"   0-1  \"Do you have questions about your medications?\"   No  \"Were you newly diagnosed with heart failure, COPD, diabetes or did you have a heart attack?\"   No  For patients on insulin: \"Did you start on insulin in the hospital or did you have your insulin dose changed?\"   No  Post Discharge Medication Reconciliation Status: discharge medications reconciled, continue medications without change.    Was MTM referral placed (*Make sure to put transitions as reason for referral)?   No    Call Summary    \"Do you have any questions or concerns about your condition or care plan at the moment?\"    No  Triage nurse advice given: BRAT diet as we discussed. Plenty of fluids to stay " "hydrated. May take  imodium to help with diarrhea. Can take one tablet with each watery  stool and a full glass of water. No more than 8 in a 24 hour period.  Follow up with PCP if symptoms fail to improve in 4-5 days. If your  symptoms worsen or you develop new or concerning symptoms, please  return to the Emergency Department for further evaluation and  treatment.  Your liver enzymes are mildly elevated. Follow up on this with your  primary care provider.    Patient was in ER x 6 in the past year (assess appropriateness of ER visits.)      \"If you have questions or things don't continue to improve, we encourage you contact us through the main clinic number,  [phone].  Even if the clinic is not open, triage nurses are available 24/7 to help you.     We would like you to know that our clinic has extended hours (provide information).  We also have urgent care (provide details on closest location and hours/contact info)\"      \"Thank you for your time and take care!\"     "

## 2024-03-11 ENCOUNTER — OFFICE VISIT (OUTPATIENT)
Dept: FAMILY MEDICINE | Facility: CLINIC | Age: 34
End: 2024-03-11
Payer: COMMERCIAL

## 2024-03-11 VITALS
WEIGHT: 205.5 LBS | DIASTOLIC BLOOD PRESSURE: 74 MMHG | HEIGHT: 65 IN | OXYGEN SATURATION: 98 % | RESPIRATION RATE: 20 BRPM | BODY MASS INDEX: 34.24 KG/M2 | HEART RATE: 60 BPM | TEMPERATURE: 97.4 F | SYSTOLIC BLOOD PRESSURE: 104 MMHG

## 2024-03-11 DIAGNOSIS — Z09 FOLLOW-UP EXAM: ICD-10-CM

## 2024-03-11 DIAGNOSIS — R74.01 TRANSAMINITIS: Primary | ICD-10-CM

## 2024-03-11 LAB
ALT SERPL W P-5'-P-CCNC: 22 U/L (ref 0–50)
AST SERPL W P-5'-P-CCNC: 18 U/L (ref 0–45)

## 2024-03-11 PROCEDURE — 84450 TRANSFERASE (AST) (SGOT): CPT | Performed by: FAMILY MEDICINE

## 2024-03-11 PROCEDURE — 84460 ALANINE AMINO (ALT) (SGPT): CPT | Performed by: FAMILY MEDICINE

## 2024-03-11 PROCEDURE — 36415 COLL VENOUS BLD VENIPUNCTURE: CPT | Performed by: FAMILY MEDICINE

## 2024-03-11 PROCEDURE — 99213 OFFICE O/P EST LOW 20 MIN: CPT | Performed by: FAMILY MEDICINE

## 2024-03-11 ASSESSMENT — PAIN SCALES - GENERAL: PAINLEVEL: NO PAIN (0)

## 2024-03-11 NOTE — PROGRESS NOTES
"  Assessment & Plan     (R74.01) Transaminitis  (primary encounter diagnosis)  Comment: Recheck labs, patient will be notified of results.  Plan: AST, ALT            (Z09) Follow-up exam  Comment: Patient doing much better.  Plan: Continue to hydrate.           MED REC REQUIRED  Post Medication Reconciliation Status: discharge medications reconciled, continue medications without change  BMI  Estimated body mass index is 34.73 kg/m  as calculated from the following:    Height as of this encounter: 1.638 m (5' 4.5\").    Weight as of this encounter: 93.2 kg (205 lb 8 oz).         FUTURE APPOINTMENTS:       - Follow-up visit as needed.    Yair Molina is a 33 year old, presenting for the following health issues:    33 yr old female here for a follow up from the ER. She was seen for abdominal pain ( LLQ) diarrhea. She had labs and CT done. Her CT was suggestive of enteritis but otherwise normal. Patient's liver enzymes were a bit high. It was recommended that she have this rechecked. Her symptoms have resolved and she is feeling much better.   ER F/U (Follow up from 3/5 re check liver labs )        3/11/2024    11:17 AM   Additional Questions   Roomed by adam antoine cma   Accompanied by self     Via the Health Maintenance questionnaire, the patient has reported the following services have been completed -Cervical Cancer Screening, this information has been sent to the abstraction team.  History of Present Illness       Reason for visit:  Doctor follow up    She eats 0-1 servings of fruits and vegetables daily.She consumes 2 sweetened beverage(s) daily.She exercises with enough effort to increase her heart rate 10 to 19 minutes per day.  She exercises with enough effort to increase her heart rate 3 or less days per week.   She is taking medications regularly.         ED/UC Followup:    Facility:  [facility]   Date of visit: 3/5/24  Reason for visit:   Enteritis    Transaminitis  Current Status: symptoms have " "subsided- needs liver labs re checked         Review of Systems  CONSTITUTIONAL: NEGATIVE for fever, chills, change in weight  ENT/MOUTH: NEGATIVE for ear, mouth and throat problems  RESP: NEGATIVE for significant cough or SOB  CV: NEGATIVE for chest pain, palpitations or peripheral edema  GI: NEGATIVE for nausea, abdominal pain, heartburn, or change in bowel habits  MUSCULOSKELETAL: NEGATIVE for significant arthralgias or myalgia  NEURO: NEGATIVE for weakness, dizziness or paresthesias  ENDOCRINE: NEGATIVE for temperature intolerance, skin/hair changes  HEME/ALLERGY/IMMUNE: NEGATIVE for bleeding problems  PSYCHIATRIC: NEGATIVE for changes in mood or affect      Objective    /74 (BP Location: Right arm, Patient Position: Sitting, Cuff Size: Adult Regular)   Pulse 60   Temp 97.4  F (36.3  C) (Tympanic)   Resp 20   Ht 1.638 m (5' 4.5\")   Wt 93.2 kg (205 lb 8 oz)   LMP 02/12/2024 (Within Days)   SpO2 98%   BMI 34.73 kg/m    Body mass index is 34.73 kg/m .  Physical Exam   GENERAL: alert and no distress  EYES: Eyes grossly normal to inspection, PERRL and conjunctivae and sclerae normal  HENT: ear canals and TM's normal, nose and mouth without ulcers or lesions  NECK: no adenopathy, no asymmetry, masses, or scars  RESP: lungs clear to auscultation - no rales, rhonchi or wheezes  CV: regular rate and rhythm, normal S1 S2, no S3 or S4, no murmur, click or rub, no peripheral edema  ABDOMEN: soft, nontender, no hepatosplenomegaly, no masses and bowel sounds normal  MS: no gross musculoskeletal defects noted, no edema    Labs pending.        Signed Electronically by: Jayson Hammond MD    "

## 2024-05-08 ENCOUNTER — MEDICAL CORRESPONDENCE (OUTPATIENT)
Dept: HEALTH INFORMATION MANAGEMENT | Facility: CLINIC | Age: 34
End: 2024-05-08
Payer: COMMERCIAL

## 2024-05-21 ENCOUNTER — E-VISIT (OUTPATIENT)
Dept: URGENT CARE | Facility: CLINIC | Age: 34
End: 2024-05-21
Payer: COMMERCIAL

## 2024-05-21 DIAGNOSIS — R42 DIZZINESS: Primary | ICD-10-CM

## 2024-05-21 PROCEDURE — 99207 PR NON-BILLABLE SERV PER CHARTING: CPT | Performed by: EMERGENCY MEDICINE

## 2024-05-21 NOTE — PATIENT INSTRUCTIONS
Dear Gloria Rodríguez,    We are sorry you are not feeling well. Based on the responses you provided, it is recommended that you be seen in-person in urgent care so we can better evaluate your symptoms. Please click here to find the nearest urgent care location to you.   You will not be charged for this Visit. Thank you for trusting us with your care.    Cj Blancas MD

## 2024-06-10 ENCOUNTER — HOSPITAL ENCOUNTER (EMERGENCY)
Facility: CLINIC | Age: 34
Discharge: HOME | End: 2024-06-10
Attending: EMERGENCY MEDICINE | Admitting: EMERGENCY MEDICINE
Payer: COMMERCIAL

## 2024-06-10 ENCOUNTER — APPOINTMENT (OUTPATIENT)
Dept: CT IMAGING | Facility: CLINIC | Age: 34
End: 2024-06-10
Attending: EMERGENCY MEDICINE
Payer: COMMERCIAL

## 2024-06-10 VITALS
TEMPERATURE: 97.6 F | SYSTOLIC BLOOD PRESSURE: 115 MMHG | DIASTOLIC BLOOD PRESSURE: 69 MMHG | RESPIRATION RATE: 18 BRPM | OXYGEN SATURATION: 96 % | HEART RATE: 81 BPM

## 2024-06-10 DIAGNOSIS — N20.1 URETEROLITHIASIS: ICD-10-CM

## 2024-06-10 DIAGNOSIS — M54.50 ACUTE LEFT-SIDED LOW BACK PAIN WITHOUT SCIATICA: ICD-10-CM

## 2024-06-10 LAB
ALBUMIN UR-MCNC: NEGATIVE MG/DL
ALBUMIN UR-MCNC: NEGATIVE MG/DL
ANION GAP SERPL CALCULATED.3IONS-SCNC: 18 MMOL/L (ref 7–15)
APPEARANCE UR: ABNORMAL
APPEARANCE UR: ABNORMAL
BASOPHILS # BLD AUTO: 0.1 10E3/UL (ref 0–0.2)
BASOPHILS NFR BLD AUTO: 1 %
BILIRUB UR QL STRIP: NEGATIVE
BILIRUB UR QL STRIP: NEGATIVE
BUN SERPL-MCNC: 9.4 MG/DL (ref 6–20)
CALCIUM SERPL-MCNC: 9 MG/DL (ref 8.6–10)
CHLORIDE SERPL-SCNC: 99 MMOL/L (ref 98–107)
COLOR UR AUTO: YELLOW
COLOR UR AUTO: YELLOW
CREAT SERPL-MCNC: 0.74 MG/DL (ref 0.51–0.95)
DEPRECATED HCO3 PLAS-SCNC: 20 MMOL/L (ref 22–29)
EGFRCR SERPLBLD CKD-EPI 2021: >90 ML/MIN/1.73M2
EOSINOPHIL # BLD AUTO: 0.2 10E3/UL (ref 0–0.7)
EOSINOPHIL NFR BLD AUTO: 2 %
ERYTHROCYTE [DISTWIDTH] IN BLOOD BY AUTOMATED COUNT: 11.9 % (ref 10–15)
GLUCOSE SERPL-MCNC: 126 MG/DL (ref 70–99)
GLUCOSE UR STRIP-MCNC: NEGATIVE MG/DL
GLUCOSE UR STRIP-MCNC: NEGATIVE MG/DL
HCG SERPL QL: NEGATIVE
HCT VFR BLD AUTO: 42.7 % (ref 35–47)
HGB BLD-MCNC: 14.7 G/DL (ref 11.7–15.7)
HGB UR QL STRIP: ABNORMAL
HGB UR QL STRIP: ABNORMAL
HOLD SPECIMEN: NORMAL
IMM GRANULOCYTES # BLD: 0 10E3/UL
IMM GRANULOCYTES NFR BLD: 0 %
KETONES UR STRIP-MCNC: NEGATIVE MG/DL
KETONES UR STRIP-MCNC: NEGATIVE MG/DL
LEUKOCYTE ESTERASE UR QL STRIP: ABNORMAL
LEUKOCYTE ESTERASE UR QL STRIP: ABNORMAL
LYMPHOCYTES # BLD AUTO: 2.5 10E3/UL (ref 0.8–5.3)
LYMPHOCYTES NFR BLD AUTO: 27 %
MCH RBC QN AUTO: 29.7 PG (ref 26.5–33)
MCHC RBC AUTO-ENTMCNC: 34.4 G/DL (ref 31.5–36.5)
MCV RBC AUTO: 86 FL (ref 78–100)
MONOCYTES # BLD AUTO: 0.5 10E3/UL (ref 0–1.3)
MONOCYTES NFR BLD AUTO: 5 %
MUCOUS THREADS #/AREA URNS LPF: PRESENT /LPF
MUCOUS THREADS #/AREA URNS LPF: PRESENT /LPF
NEUTROPHILS # BLD AUTO: 6.1 10E3/UL (ref 1.6–8.3)
NEUTROPHILS NFR BLD AUTO: 66 %
NITRATE UR QL: NEGATIVE
NITRATE UR QL: NEGATIVE
NRBC # BLD AUTO: 0 10E3/UL
NRBC BLD AUTO-RTO: 0 /100
PH UR STRIP: 5 [PH] (ref 5–7)
PH UR STRIP: 5 [PH] (ref 5–7)
PLATELET # BLD AUTO: 422 10E3/UL (ref 150–450)
POTASSIUM SERPL-SCNC: 3.8 MMOL/L (ref 3.4–5.3)
RBC # BLD AUTO: 4.95 10E6/UL (ref 3.8–5.2)
RBC URINE: 10 /HPF
RBC URINE: 8 /HPF
SODIUM SERPL-SCNC: 137 MMOL/L (ref 135–145)
SP GR UR STRIP: 1.02 (ref 1–1.03)
SP GR UR STRIP: 1.02 (ref 1–1.03)
SQUAMOUS EPITHELIAL: 3 /HPF
SQUAMOUS EPITHELIAL: 4 /HPF
UROBILINOGEN UR STRIP-MCNC: NORMAL MG/DL
UROBILINOGEN UR STRIP-MCNC: NORMAL MG/DL
WBC # BLD AUTO: 9.3 10E3/UL (ref 4–11)
WBC URINE: 8 /HPF
WBC URINE: 9 /HPF

## 2024-06-10 PROCEDURE — 36415 COLL VENOUS BLD VENIPUNCTURE: CPT | Performed by: EMERGENCY MEDICINE

## 2024-06-10 PROCEDURE — 250N000011 HC RX IP 250 OP 636: Mod: JZ | Performed by: EMERGENCY MEDICINE

## 2024-06-10 PROCEDURE — 81001 URINALYSIS AUTO W/SCOPE: CPT | Performed by: EMERGENCY MEDICINE

## 2024-06-10 PROCEDURE — 84703 CHORIONIC GONADOTROPIN ASSAY: CPT | Performed by: EMERGENCY MEDICINE

## 2024-06-10 PROCEDURE — 250N000013 HC RX MED GY IP 250 OP 250 PS 637: Performed by: EMERGENCY MEDICINE

## 2024-06-10 PROCEDURE — 85025 COMPLETE CBC W/AUTO DIFF WBC: CPT | Performed by: EMERGENCY MEDICINE

## 2024-06-10 PROCEDURE — 74176 CT ABD & PELVIS W/O CONTRAST: CPT

## 2024-06-10 PROCEDURE — 99285 EMERGENCY DEPT VISIT HI MDM: CPT | Mod: 25 | Performed by: EMERGENCY MEDICINE

## 2024-06-10 PROCEDURE — 80048 BASIC METABOLIC PNL TOTAL CA: CPT | Performed by: EMERGENCY MEDICINE

## 2024-06-10 PROCEDURE — 99284 EMERGENCY DEPT VISIT MOD MDM: CPT | Performed by: EMERGENCY MEDICINE

## 2024-06-10 PROCEDURE — 96374 THER/PROPH/DIAG INJ IV PUSH: CPT | Performed by: EMERGENCY MEDICINE

## 2024-06-10 RX ORDER — ONDANSETRON 2 MG/ML
4 INJECTION INTRAMUSCULAR; INTRAVENOUS EVERY 30 MIN PRN
Status: DISCONTINUED | OUTPATIENT
Start: 2024-06-10 | End: 2024-06-11 | Stop reason: HOSPADM

## 2024-06-10 RX ORDER — OXYCODONE HYDROCHLORIDE 5 MG/1
5 TABLET ORAL ONCE
Status: COMPLETED | OUTPATIENT
Start: 2024-06-10 | End: 2024-06-10

## 2024-06-10 RX ADMIN — ONDANSETRON 4 MG: 2 INJECTION INTRAMUSCULAR; INTRAVENOUS at 19:36

## 2024-06-10 ASSESSMENT — COLUMBIA-SUICIDE SEVERITY RATING SCALE - C-SSRS
1. IN THE PAST MONTH, HAVE YOU WISHED YOU WERE DEAD OR WISHED YOU COULD GO TO SLEEP AND NOT WAKE UP?: NO
6. HAVE YOU EVER DONE ANYTHING, STARTED TO DO ANYTHING, OR PREPARED TO DO ANYTHING TO END YOUR LIFE?: NO
2. HAVE YOU ACTUALLY HAD ANY THOUGHTS OF KILLING YOURSELF IN THE PAST MONTH?: NO

## 2024-06-10 ASSESSMENT — ACTIVITIES OF DAILY LIVING (ADL)
ADLS_ACUITY_SCORE: 35
ADLS_ACUITY_SCORE: 33

## 2024-06-10 NOTE — Clinical Note
Gloria Rodríguez was seen and treated in our emergency department on 6/10/2024.  She may return to work on 06/12/2024.       If you have any questions or concerns, please don't hesitate to call.      Buddy Chatman MD

## 2024-06-10 NOTE — ED TRIAGE NOTES
Patient started having sudden onset of pain around 45 minutes ago. Pain located in left lower back and does not radiate. No urinary sx.

## 2024-06-10 NOTE — ED NOTES
My assessment, based on my focused history, established that Gloria Rodríguez has a potential emergent condition, which requires further testing and/or treatment beyond the capabilities of the current urgent care setting.  This condition was discussed with the patient as being sharp flank pain.  Testing may require imaging, and or blood testing.  As such, I have recommended that the patient be evaluated and treated in the  ED.     Disclaimer: This note consists of symbols derived from keyboarding, dictation, and/or voice recognition software. As a result, there may be errors in the script that have gone undetected.  Please consider this when interpreting information found in the chart.       Denise Barclay, MACARENA CNP  06/10/24 9414

## 2024-06-11 NOTE — DISCHARGE INSTRUCTIONS
Drink plenty of fluids to stay hydrated.     Acetaminophen for pain.     A Urology referral for follow up has been made.     You will like pass the stone soon.     Return to ED if you develop a fever > 100.4, pain with urination, uncontrollable pain, persistent nausea and vomiting, or other new symptoms that you find concerning.

## 2024-06-11 NOTE — ED PROVIDER NOTES
History     Chief Complaint   Patient presents with    Back Pain     HPI  Gloria Rodríguez is a 33 year old female with history with acute onset left low back pain associate with nausea vomiting and no known injury.  Was feeling well this morning.  Pain seem to come out of nowhere and has not had pain like this before.  Says she has had issues with sciatic pain but this feels different.  No lower extremity weakness or numbness.  Pain does not radiate.  It is severe and intensity fluctuates.  No history of kidney stones.  No fevers or chills..  No change in bowel or bladder function.    The patient's PMHx, Surgical Hx, Allergies, and Medications were all reviewed with the patient.    Allergies:  Allergies   Allergen Reactions    Banana Hives    Fruit Extracts      Bananas, itchy mouth    Ibuprofen Sodium Hives    Oseltamivir      Other reaction(s): Tremors    Sulfa Antibiotics Hives    Other [No Clinical Screening - See Comments] Rash     bleach    Silver Rash    Sodium Hypochlorite Other (See Comments) and Rash       Problem List:    Patient Active Problem List    Diagnosis Date Noted    Gestational hypertension 2024     Priority: Medium    Anemia due to blood loss, acute 2024     Priority: Medium    S/P repeat low transverse  2024     Priority: Medium    Placenta previa in second trimester 2023     Priority: Medium    History of gestational diabetes 2023     Priority: Medium    History of  section 2018     Priority: Medium    Pilonidal cyst 2015     Priority: Medium    CARDIOVASCULAR SCREENING; LDL GOAL LESS THAN 130      Priority: Medium    NO ACTIVE PROBLEMS 11/15/2012     Priority: Medium        Past Medical History:    Past Medical History:   Diagnosis Date    Anxiety     Depression     Gestational diabetes mellitus     NO ACTIVE PROBLEMS     Postpartum depression 2016    PTSD (post-traumatic stress disorder)        Past Surgical History:    Past  Surgical History:   Procedure Laterality Date     SECTION           SECTION N/A 2024    Procedure:  DELIVERY;  Surgeon: Donna Castelan MD;  Location: [facility] OR    PILONIDAL CYST / SINUS EXCISION         Family History:    Family History   Problem Relation Age of Onset    Depression Mother     Substance Abuse Mother         Alcoholic    Kidney Disease Mother     Bipolar Disorder Mother     Ulcers Mother         stomach    Hyperlipidemia Father     Hypertension Paternal Grandmother     Thrombosis Paternal Grandfather     Thyroid Disease Paternal Grandfather        Social History:  Marital Status:  Single [1]  Social History     Tobacco Use    Smoking status: Never    Smokeless tobacco: Never   Vaping Use    Vaping status: Never Used   Substance Use Topics    Alcohol use: No    Drug use: Yes     Comment: medical marijuana- quit with pregnancy        Medications:    loratadine (CLARITIN) 10 MG tablet  norgestimate-ethinyl estradiol (ORTHO-CYCLEN) 0.25-35 MG-MCG tablet  nystatin (MYCOSTATIN) 822685 UNIT/GM external cream          Review of Systems  Pertinent positives and negatives mentioned in HPI    Physical Exam   BP: 131/72  Pulse: 74  Temp: 98.2  F (36.8  C)  Resp: 16  SpO2: 100 %    GEN: Awake, alert, and cooperative.  Appears distressed but nontoxic   CV : Extremities warm and well perfused.  PULM: Normal effort. Speaking in full sentences.  ABD:, Nontender, nondistended.  BACK: CVA tenderness.  Mild tenderness in the low back on the left at the level of lower lumbar spine behind left of midline.  NEURO: Normal speech. Following commands.  5/5 symmetric strength flexors, flexion and extension at the knee and ankle plantar and dorsiflexion.  Sensation intact to light touch.  EXT: No gross deformity.   INT: Warm. No diaphoresis. Normal color.     ED Course        Procedures                 Critical Care time:  none               Results for orders placed or performed  during the hospital encounter of 06/10/24 (from the past 24 hour(s))   UA Macroscopic with reflex to Microscopic and Culture    Specimen: Urine, Midstream   Result Value Ref Range    Color Urine Yellow Colorless, Straw, Light Yellow, Yellow    Appearance Urine Slightly Cloudy (A) Clear    Glucose Urine Negative Negative mg/dL    Bilirubin Urine Negative Negative    Ketones Urine Negative Negative mg/dL    Specific Gravity Urine 1.021 1.003 - 1.035    Blood Urine Small (A) Negative    pH Urine 5.0 5.0 - 7.0    Protein Albumin Urine Negative Negative mg/dL    Urobilinogen Urine Normal Normal, 2.0 mg/dL    Nitrite Urine Negative Negative    Leukocyte Esterase Urine Trace (A) Negative    Mucus Urine Present (A) None Seen /LPF    RBC Urine 8 (H) <=2 /HPF    WBC Urine 8 (H) <=5 /HPF    Squamous Epithelials Urine 3 (H) <=1 /HPF    Narrative    Urine Culture not indicated   HCG qualitative   Result Value Ref Range    hCG Serum Qualitative Negative Negative   CBC with platelets, differential    Narrative    The following orders were created for panel order CBC with platelets, differential.  Procedure                               Abnormality         Status                     ---------                               -----------         ------                     CBC with platelets and d...[[id number]]                      Final result                 Please view results for these tests on the individual orders.   Basic metabolic panel   Result Value Ref Range    Sodium 137 135 - 145 mmol/L    Potassium 3.8 3.4 - 5.3 mmol/L    Chloride 99 98 - 107 mmol/L    Carbon Dioxide (CO2) 20 (L) 22 - 29 mmol/L    Anion Gap 18 (H) 7 - 15 mmol/L    Urea Nitrogen 9.4 6.0 - 20.0 mg/dL    Creatinine 0.74 0.51 - 0.95 mg/dL    GFR Estimate >90 >60 mL/min/1.73m2    Calcium 9.0 8.6 - 10.0 mg/dL    Glucose 126 (H) 70 - 99 mg/dL   [place] Draw    Narrative    The following orders were created for panel order [place] Draw.  Procedure                                Abnormality         Status                     ---------                               -----------         ------                     Extra Blue Top Tube[[id number]]                              Final result                 Please view results for these tests on the individual orders.   CBC with platelets and differential   Result Value Ref Range    WBC Count 9.3 4.0 - 11.0 10e3/uL    RBC Count 4.95 3.80 - 5.20 10e6/uL    Hemoglobin 14.7 11.7 - 15.7 g/dL    Hematocrit 42.7 35.0 - 47.0 %    MCV 86 78 - 100 fL    MCH 29.7 26.5 - 33.0 pg    MCHC 34.4 31.5 - 36.5 g/dL    RDW 11.9 10.0 - 15.0 %    Platelet Count 422 150 - 450 10e3/uL    % Neutrophils 66 %    % Lymphocytes 27 %    % Monocytes 5 %    % Eosinophils 2 %    % Basophils 1 %    % Immature Granulocytes 0 %    NRBCs per 100 WBC 0 <1 /100    Absolute Neutrophils 6.1 1.6 - 8.3 10e3/uL    Absolute Lymphocytes 2.5 0.8 - 5.3 10e3/uL    Absolute Monocytes 0.5 0.0 - 1.3 10e3/uL    Absolute Eosinophils 0.2 0.0 - 0.7 10e3/uL    Absolute Basophils 0.1 0.0 - 0.2 10e3/uL    Absolute Immature Granulocytes 0.0 <=0.4 10e3/uL    Absolute NRBCs 0.0 10e3/uL   Extra Blue Top Tube   Result Value Ref Range    Hold Specimen JIC    Abd/pelvis CT - no contrast - Stone Protocol    Narrative    EXAM: CT ABDOMEN PELVIS W/O CONTRAST  LOCATION: [facility]  DATE: 6/10/2024    INDICATION: acute onset left low back pain, colicky in nature, associated n v  COMPARISON: CT abdomen/pelvis 3/5/2024.  TECHNIQUE: CT scan of the abdomen and pelvis was performed without IV contrast. Multiplanar reformats were obtained. Dose reduction techniques were used.  CONTRAST: None.    FINDINGS:   LOWER CHEST: Minimal right basilar atelectasis and/or scarring.    HEPATOBILIARY: Normal.    PANCREAS: Normal.    SPLEEN: Normal.    ADRENAL GLANDS: Normal.    KIDNEYS/BLADDER: Obstructing 0.3 cm calculus at the left ureterovesical junction (series 4, image 51) with mild left  hydroureteronephrosis. No right hydronephrosis or urinary tract calculus. Partially distended urinary bladder is grossly unremarkable.    BOWEL: No bowel obstruction. Normal appendix. Small hiatal hernia. No ascites or pneumoperitoneum.    LYMPH NODES: Normal.    VASCULATURE: Normal caliber abdominal aorta without significant calcified atherosclerotic plaque.    PELVIC ORGANS: Normal.    MUSCULOSKELETAL: Mild thoracolumbar spondylosis. No destructive osseous lesion.      Impression    IMPRESSION:   1.  Obstructing 0.3 cm calculus at the left ureterovesical junction with mild left hydroureteronephrosis.     UA with Microscopic reflex to Culture    Specimen: Urine, Clean Catch   Result Value Ref Range    Color Urine Yellow Colorless, Straw, Light Yellow, Yellow    Appearance Urine Slightly Cloudy (A) Clear    Glucose Urine Negative Negative mg/dL    Bilirubin Urine Negative Negative    Ketones Urine Negative Negative mg/dL    Specific Gravity Urine 1.019 1.003 - 1.035    Blood Urine Moderate (A) Negative    pH Urine 5.0 5.0 - 7.0    Protein Albumin Urine Negative Negative mg/dL    Urobilinogen Urine Normal Normal, 2.0 mg/dL    Nitrite Urine Negative Negative    Leukocyte Esterase Urine Trace (A) Negative    Mucus Urine Present (A) None Seen /LPF    RBC Urine 10 (H) <=2 /HPF    WBC Urine 9 (H) <=5 /HPF    Squamous Epithelials Urine 4 (H) <=1 /HPF    Narrative    Urine Culture not indicated       Medications   ondansetron (ZOFRAN) injection 4 mg (4 mg Intravenous $Given 6/10/24 1936)   oxyCODONE (ROXICODONE) tablet 5 mg (5 mg Oral Not Given 6/10/24 1936)       Assessments & Plan (with Medical Decision Making)   33 year old female with acute onset colicky left low back pain with associated nausea vomiting detailed in HPI.  No CVA tenderness.  No abdominal tenderness.  Mild left-sided back tenderness left lumbar paraspinal but no ecchymosis or abrasion.  Normal strength and sensation in lower extremities.  Do not think  any advanced imaging of her back is necessary.  Given the colicky nature and nausea vomiting I do have high suspicion for urolithiasis.  Urinalysis obtained and and a few reds and whites but also several squamous cells so unable to reliably interpret.  Nitrate is negative.  No tachycardia or fever here.  CBC is normal.  BMP grossly normal, modest elevated gap acidosis with bicarb of 20 and gap of 18, undetermined significance.  Repeat urinalysis also contaminated.  CT scan without contrast shows a 3 mm left-sided stone in the UPJ with mild amount of hydroureteronephrosis.  Images reviewed and interpreted independently.  Patient was given Percocet for pain and ondansetron for nausea.  She declined the Percocet because she does not like the way it makes her feel.  She does have an allergy to ibuprofen which is unfortunate for her in this setting.  Considered ketorolac but after discussing with pharmacist highly likely to also trigger allergy.  Discussed findings with patient and urology referral placed.  To drink plenty of fluids and acetaminophen for pain control.  ED return precautions discussed.  Patient eager to be discharged home.         I have reviewed the nursing notes.         New Prescriptions    No medications on file       Final diagnoses:   Ureterolithiasis   Acute left-sided low back pain without sciatica     Buddy Chatman MD        6/10/2024   [facility] EMERGENCY DEPT    Disclaimer: This note consists of words and symbols derived from keyboarding and dictation using voice recognition software.  As a result, there may be errors that have gone undetected.  Please consider this when interpreting information found in this note.               Buddy Chatman MD  06/10/24 [id number]

## 2024-06-18 NOTE — CONFIDENTIAL NOTE
Chief Complaint:   Kidney stones         History of Present Illness:   Gloria Rodríguez is a 33 year old female presenting for an evaluation of kidney stones.     The patient presented to the ED on 6/10/2024 for left lower back pain with nausea and vomiting. UA was notable for 10 RBCs and 9 WBCs. WBC count and renal function were WNL. CT abdomen pelvis was notable for a 3 mm stone at the left UVJ with mild left hydronephrosis.     The patient reports steady improvement in her left abdominal pain. Her symptoms resolved a few days ago. She denies dysuria and gross hematuria.     This was her first kidney stone. She denies a family history of kidney stones.          Past Medical History:     Past Medical History:   Diagnosis Date    Anxiety     Depression     Gestational diabetes mellitus     NO ACTIVE PROBLEMS     Postpartum depression 2016    PTSD (post-traumatic stress disorder)             Past Surgical History:     Past Surgical History:   Procedure Laterality Date     SECTION      ,     SECTION N/A 2024    Procedure:  DELIVERY;  Surgeon: Donna Castelan MD;  Location: [facility]    PILONIDAL CYST / SINUS EXCISION              Medications     Current Outpatient Medications   Medication Sig Dispense Refill    loratadine (CLARITIN) 10 MG tablet Take 10 mg by mouth daily      norgestimate-ethinyl estradiol (ORTHO-CYCLEN) 0.25-35 MG-MCG tablet Take 1 tablet by mouth daily 84 tablet 4    nystatin (MYCOSTATIN) 117016 UNIT/GM external cream Apply topically 2 times daily 30 g 3     No current facility-administered medications for this visit.            Allergies:   Banana, Fruit extracts, Ibuprofen sodium, Oseltamivir, Sulfa antibiotics, Other [no clinical screening - see comments], Silver, and Sodium hypochlorite         Review of Systems:  From intake questionnaire   Negative 14 system review except as noted on HPI, nurse's note.         Physical Exam:   Patient is a  33 year old female evaluated via video visit.       Labs and Pathology:    I personally reviewed all applicable laboratory data and went over findings with patient  Significant for:    CBC RESULTS:  Recent Labs   Lab Test 06/10/24  1847 03/05/24  1125 01/03/24  0531 01/02/24  0655   WBC 9.3 5.7 9.5 9.6   HGB 14.7 15.8* 10.8* 13.0    368 257 305        BMP RESULTS:  Recent Labs   Lab Test 06/10/24  1847 03/05/24  1125 01/02/24  0655 06/12/23  1526 09/14/22  1521 02/23/17  1600    140  --  139  --  140   POTASSIUM 3.8 3.6  --  3.6  --  4.0   CHLORIDE 99 105  --  102  --  108   CO2 20* 23  --  24  --  19*   ANIONGAP 18* 12  --  13  --  13   * 102*  --  88 94 120*   BUN 9.4 5.1*  --  4.7*  --  12   CR 0.74 0.77 0.53 0.54  --  0.72   GFRESTIMATED >90 >90 >90 >90  --  >90  Non  GFR Calc     GFRESTBLACK  --   --   --   --   --  >90  African American GFR Calc     JHON 9.0 9.1  --  9.4  --  8.9       UA RESULTS:   Recent Labs   Lab Test 06/10/24  2115 06/10/24  1758 10/25/23  1811   SG 1.019 1.021 1.026   URINEPH 5.0 5.0 5.0   NITRITE Negative Negative Negative   RBCU 10* 8* 0   WBCU 9* 8* 0         Imaging:    I personally reviewed all applicable imaging and went over findings with patient.  Significant for:    Results for orders placed or performed during the hospital encounter of 06/10/24   Abd/pelvis CT - no contrast - Stone Protocol    Narrative    EXAM: CT ABDOMEN PELVIS W/O CONTRAST  LOCATION: [facility]  DATE: 6/10/2024    INDICATION: acute onset left low back pain, colicky in nature, associated n v  COMPARISON: CT abdomen/pelvis 3/5/2024.  TECHNIQUE: CT scan of the abdomen and pelvis was performed without IV contrast. Multiplanar reformats were obtained. Dose reduction techniques were used.  CONTRAST: None.    FINDINGS:   LOWER CHEST: Minimal right basilar atelectasis and/or scarring.    HEPATOBILIARY: Normal.    PANCREAS: Normal.    SPLEEN:  Normal.    ADRENAL GLANDS: Normal.    KIDNEYS/BLADDER: Obstructing 0.3 cm calculus at the left ureterovesical junction (series 4, image 51) with mild left hydroureteronephrosis. No right hydronephrosis or urinary tract calculus. Partially distended urinary bladder is grossly unremarkable.    BOWEL: No bowel obstruction. Normal appendix. Small hiatal hernia. No ascites or pneumoperitoneum.    LYMPH NODES: Normal.    VASCULATURE: Normal caliber abdominal aorta without significant calcified atherosclerotic plaque.    PELVIC ORGANS: Normal.    MUSCULOSKELETAL: Mild thoracolumbar spondylosis. No destructive osseous lesion.      Impression    IMPRESSION:   1.  Obstructing 0.3 cm calculus at the left ureterovesical junction with mild left hydroureteronephrosis.              Assessment and Plan:   Assessment: Gloria Rodríguez is a 33 year old female seen in evaluation for a 3 mm left UVJ stone found on CT from 6/10/2024. Her symptoms resolved a few days ago.     We discussed that her probability of stone passage is high. We will obtain a follow up CT AP to confirm passage. The patient is in agreement.     We reviewed general recommendations to prevent kidney stones including the followin) Low oxalate diet. We discussed foods that are particularly high in oxalate including spinach, rhubarb, beets, nuts, nut butters, and black teas.     2) Low salt diet. Discussed common foods with high amounts of salt as well as dietary strategies to minimize sodium.     3) High fluid intake. Recommend 3 liters of fluid daily to produce goal of 2.5L of urine.     4) Modest animal protein. Recommend limiting animal protein to one serving or less daily.     5) Normal dietary calcium.     Plan:  CT abdomen pelvis without contrast to ensure stone passage.       Brittney Haji PA-C  Department of Urology

## 2024-06-19 ENCOUNTER — VIRTUAL VISIT (OUTPATIENT)
Dept: UROLOGY | Facility: CLINIC | Age: 34
End: 2024-06-19
Attending: EMERGENCY MEDICINE
Payer: COMMERCIAL

## 2024-06-19 DIAGNOSIS — N20.1 URETEROLITHIASIS: ICD-10-CM

## 2024-06-19 PROCEDURE — 99203 OFFICE O/P NEW LOW 30 MIN: CPT | Mod: 95 | Performed by: STUDENT IN AN ORGANIZED HEALTH CARE EDUCATION/TRAINING PROGRAM

## 2024-06-19 NOTE — PATIENT INSTRUCTIONS
How Can I Prevent Kidney Stones?     Here is a good resource  [email or web address]-kidney-stone-diet/    Drink enough fluids each day.  Drink at least 2-3 liters of water or other low-sugar, low-calorie beverages daily, distributed throughout the day as much as possible    Reduce the amount of salt in your diet.  Restrict salt to 0170-0931 mg/day by reducing intake of processed foods, cheese, luncheon meats, salty snacks, and added salt  Your chance of developing kidney stones increases when you eat more sodium. Sodium is a part of salt. Sodium is in many canned, packaged, and fast foods. It is also in many condiments, seasonings, and meats.    Eat less meat.  Limit meat, poultry, and fish intake; limit of 6 ounces per day is a good starting point  Although you may need to limit how much animal protein you eat each day, you still need to make sure you get enough protein. Consider replacing some of the meat and animal protein you would typically eat with beans, dried peas, and lentils, which are plant-based foods that are high in protein and low in oxalate.    Eat foods with low oxalate levels.  If you normally eat a lot of high-oxalate foods (spinach, beets, chocolate, nuts, seeds, and potatoes), reduce portion sizes and eat them less often     Calcium  Even though calcium sounds like it would be the cause of calcium stones, it s not. In the right amounts, calcium can block other substances in the digestive tract that may cause stones  You want to get between 1,000 and 1,200 mg daily. This amount of calcium is ideal for both protection against bone mineral loss and reducing oxalate absorption.  Calcium is in dairy products like milk and cheese and yogurts and you should get few servings of these per day    More Fruits and Vegetables  Fruits and vegetables collectively provide many stone inhibitors that may prevent all types of urinary tract stones. These inhibitors include potassium, magnesium,  fiber, citric acid, phytate, and antioxidants. Eating at least 5 servings of a variety of fruits and vegetables every day may not only prevent you from forming more kidney stones, but also keep you healthy in other ways.

## 2024-06-19 NOTE — PROGRESS NOTES
Gloria Rodríguez is a 33 year old year old who is being evaluated via a billable video visit.      How would you like to obtain your AVS? [place]  If the video visit is dropped, the invitation should be resent by: Text to cell phone: [phone]  Will anyone else be joining your video visit? No    Video-Visit Details    Type of service:  Video Visit     Originating Location (pt. Location): Home    Distant Location (provider location):  Off-site  Platform used for Video Visit: IngridWell

## 2024-06-27 ENCOUNTER — HOSPITAL ENCOUNTER (OUTPATIENT)
Dept: CT IMAGING | Facility: CLINIC | Age: 34
Discharge: HOME | End: 2024-06-27
Attending: STUDENT IN AN ORGANIZED HEALTH CARE EDUCATION/TRAINING PROGRAM | Admitting: STUDENT IN AN ORGANIZED HEALTH CARE EDUCATION/TRAINING PROGRAM
Payer: COMMERCIAL

## 2024-06-27 DIAGNOSIS — N20.1 URETEROLITHIASIS: ICD-10-CM

## 2024-06-27 PROCEDURE — 74176 CT ABD & PELVIS W/O CONTRAST: CPT

## 2024-07-01 ENCOUNTER — TELEPHONE (OUTPATIENT)
Dept: UROLOGY | Facility: CLINIC | Age: 34
End: 2024-07-01
Payer: COMMERCIAL

## 2024-07-01 NOTE — TELEPHONE ENCOUNTER
Discussed CT results, which shows persistence of the left UVJ stone. I will send her case to Dr. Torres to determine whether URS would be recommended at this time. The patient is comfortable proceeding, if recommended.

## 2024-07-02 ENCOUNTER — TELEPHONE (OUTPATIENT)
Dept: SURGERY | Facility: CLINIC | Age: 34
End: 2024-07-02
Payer: COMMERCIAL

## 2024-07-02 ENCOUNTER — HOSPITAL ENCOUNTER (OUTPATIENT)
Facility: CLINIC | Age: 34
End: 2024-07-02
Attending: STUDENT IN AN ORGANIZED HEALTH CARE EDUCATION/TRAINING PROGRAM | Admitting: STUDENT IN AN ORGANIZED HEALTH CARE EDUCATION/TRAINING PROGRAM
Payer: COMMERCIAL

## 2024-07-02 DIAGNOSIS — N20.0 NEPHROLITHIASIS: Primary | ICD-10-CM

## 2024-07-02 DIAGNOSIS — N39.0 URINARY TRACT INFECTION: Primary | ICD-10-CM

## 2024-07-10 ENCOUNTER — OFFICE VISIT (OUTPATIENT)
Dept: FAMILY MEDICINE | Facility: CLINIC | Age: 34
End: 2024-07-10
Payer: COMMERCIAL

## 2024-07-10 ENCOUNTER — ANESTHESIA EVENT (OUTPATIENT)
Dept: SURGERY | Facility: CLINIC | Age: 34
End: 2024-07-10
Payer: COMMERCIAL

## 2024-07-10 VITALS
TEMPERATURE: 97.3 F | OXYGEN SATURATION: 99 % | HEART RATE: 83 BPM | BODY MASS INDEX: 34.15 KG/M2 | SYSTOLIC BLOOD PRESSURE: 120 MMHG | DIASTOLIC BLOOD PRESSURE: 80 MMHG | WEIGHT: 200 LBS | RESPIRATION RATE: 20 BRPM | HEIGHT: 64 IN

## 2024-07-10 DIAGNOSIS — N20.1 URETEROLITHIASIS: ICD-10-CM

## 2024-07-10 DIAGNOSIS — Z01.818 PRE-OP EVALUATION: Primary | ICD-10-CM

## 2024-07-10 LAB — HCG UR QL: NEGATIVE

## 2024-07-10 PROCEDURE — 87086 URINE CULTURE/COLONY COUNT: CPT | Performed by: NURSE PRACTITIONER

## 2024-07-10 PROCEDURE — 81025 URINE PREGNANCY TEST: CPT | Performed by: NURSE PRACTITIONER

## 2024-07-10 PROCEDURE — 99214 OFFICE O/P EST MOD 30 MIN: CPT | Performed by: NURSE PRACTITIONER

## 2024-07-10 RX ORDER — SODIUM CHLORIDE, SODIUM LACTATE, POTASSIUM CHLORIDE, CALCIUM CHLORIDE 600; 310; 30; 20 MG/100ML; MG/100ML; MG/100ML; MG/100ML
INJECTION, SOLUTION INTRAVENOUS CONTINUOUS
Status: CANCELLED | OUTPATIENT
Start: 2024-07-10

## 2024-07-10 RX ORDER — LIDOCAINE 40 MG/G
CREAM TOPICAL
Status: CANCELLED | OUTPATIENT
Start: 2024-07-10

## 2024-07-10 ASSESSMENT — PAIN SCALES - GENERAL: PAINLEVEL: NO PAIN (0)

## 2024-07-10 NOTE — PROGRESS NOTES
Preoperative Evaluation  [facility]  [address] [address]  Phone: [phone]  Primary Provider: [facility]  Pre-op Performing Provider: MACARENA Hamm CNP  Jul 10, 2024             7/10/2024   Surgical Information   What procedure is being done? Kidney stone removal- Cystoscopy, Left Ureteroscopy, Left Retrograde Pyelogram, Left Laser Lithotripsy, Possible Left Ureteral Stent Placement    Facility or Hospital where procedure/surgery will be performed: [facility].   Who is doing the procedure / surgery?  Dr. Torres, V   Date of surgery / procedure: 07/17/24   Time of surgery / procedure: 7:30   Where do you plan to recover after surgery? at home with family        Fax number for surgical facility: Note does not need to be faxed, will be available electronically in Epic.    Assessment & Plan     The proposed surgical procedure is considered INTERMEDIATE risk.    Pre-op evaluation  Ureterolithiasis  - HCG Qual, Urine ([id number])       - No identified additional risk factors other than previously addressed    Antiplatelet or Anticoagulation Medication Instructions   - Patient is on no antiplatelet or anticoagulation medications.    Additional Medication Instructions  Take all scheduled medications on the day of surgery    Recommendation  Approval given to proceed with proposed procedure, without further diagnostic evaluation.              Yair Molina is a 33 year old, presenting for the following:  Pre-Op Exam          7/10/2024     9:42 AM   Additional Questions   Roomed by Jerilyn JUAN CMA   Accompanied by self         7/10/2024     9:42 AM   Patient Reported Additional Medications   Patient reports taking the following new medications none     HPI related to upcoming procedure:   Kidney stone for one month        7/10/2024   Pre-Op Questionnaire   Have you ever had a heart attack or stroke? No   Have you ever had surgery on your heart or  blood vessels, such as a stent placement, a coronary artery bypass, or surgery on an artery in your head, neck, heart, or legs? No   Do you have chest pain with activity? No   Do you have a history of heart failure? No   Do you currently have a cold, bronchitis or symptoms of other infection? No   Do you have a cough, shortness of breath, or wheezing? No   Do you or anyone in your family have previous history of blood clots? No   Do you or does anyone in your family have a serious bleeding problem such as prolonged bleeding following surgeries or cuts? No   Have you ever had problems with anemia or been told to take iron pills? No   Have you had any abnormal blood loss such as black, tarry or bloody stools, or abnormal vaginal bleeding? No   Have you ever had a blood transfusion? No   Are you willing to have a blood transfusion if it is medically needed before, during, or after your surgery? Yes   Have you or any of your relatives ever had problems with anesthesia? No   Do you have sleep apnea, excessive snoring or daytime drowsiness? No   Do you have any artifical heart valves or other implanted medical devices like a pacemaker, defibrillator, or continuous glucose monitor? No   Do you have artificial joints? No   Are you allergic to latex? No        Health Care Directive  Patient does not have a Health Care Directive or Living Will: Discussed advance care planning with patient; however, patient declined at this time.    Preoperative Review of    reviewed - one script in January after a c section          Patient Active Problem List    Diagnosis Date Noted    Ureterolithiasis 07/10/2024     Priority: Medium    Gestational hypertension 2024     Priority: Medium    Anemia due to blood loss, acute 2024     Priority: Medium    S/P repeat low transverse  2024     Priority: Medium    Placenta previa in second trimester 2023     Priority: Medium    History of gestational diabetes  "2023     Priority: Medium    History of  section 2018     Priority: Medium    Pilonidal cyst 2015     Priority: Medium    CARDIOVASCULAR SCREENING; LDL GOAL LESS THAN 130      Priority: Medium    NO ACTIVE PROBLEMS 11/15/2012     Priority: Medium      Past Medical History:   Diagnosis Date    Anxiety     Depression     Gestational diabetes mellitus     NO ACTIVE PROBLEMS     Postpartum depression     PTSD (post-traumatic stress disorder)      Past Surgical History:   Procedure Laterality Date     SECTION      ,     SECTION N/A 2024    Procedure:  DELIVERY;  Surgeon: Donna Castelan MD;  Location: [facility] OR    PILONIDAL CYST / SINUS EXCISION       Current Outpatient Medications   Medication Sig Dispense Refill    loratadine (CLARITIN) 10 MG tablet Take 10 mg by mouth daily      nystatin (MYCOSTATIN) 190366 UNIT/GM external cream Apply topically 2 times daily (Patient not taking: Reported on 7/10/2024) 30 g 3       Allergies   Allergen Reactions    Banana Hives    Fruit Extracts      Bananas, itchy mouth    Ibuprofen Sodium Hives    Oseltamivir      Other reaction(s): Tremors    Sulfa Antibiotics Hives    Other [No Clinical Screening - See Comments] Rash     bleach    Silver Rash    Sodium Hypochlorite Other (See Comments) and Rash        Social History     Tobacco Use    Smoking status: Never    Smokeless tobacco: Never   Substance Use Topics    Alcohol use: No       History   Drug Use     Comment: medical marijuana- quit with pregnancy             Review of Systems  Constitutional, HEENT, cardiovascular, pulmonary, gi and gu systems are negative, except as otherwise noted.    Objective    /80 (BP Location: Right arm, Patient Position: Sitting, Cuff Size: Adult Large)   Pulse 83   Temp 97.3  F (36.3  C) (Tympanic)   Resp 20   Ht 1.626 m (5' 4\")   Wt 90.7 kg (200 lb)   LMP 2024 (Exact Date)   SpO2 99%   BMI 34.33 kg/m   " "  Estimated body mass index is 34.33 kg/m  as calculated from the following:    Height as of this encounter: 1.626 m (5' 4\").    Weight as of this encounter: 90.7 kg (200 lb).  Physical Exam  GENERAL: alert and no distress  HENT: ear canals and TM's normal, nose and mouth without ulcers or lesions  NECK: no adenopathy, no asymmetry, masses, or scars  RESP: lungs clear to auscultation - no rales, rhonchi or wheezes  CV: regular rate and rhythm, normal S1 S2, no S3 or S4, no murmur, click or rub, no peripheral edema  ABDOMEN: soft, nontender, no hepatosplenomegaly, no masses and bowel sounds normal  MS: no gross musculoskeletal defects noted, no edema  PSYCH: mentation appears normal, affect normal/bright    Recent Labs   Lab Test 06/10/24  1847 03/05/24  1125   HGB 14.7 15.8*    368    140   POTASSIUM 3.8 3.6   CR 0.74 0.77        Diagnostics  Recent Results (from the past 24 hour(s))   HCG Qual, Urine ([id number])    Collection Time: 07/10/24  9:59 AM   Result Value Ref Range    hCG Urine Qualitative Negative Negative        No EKG required, no history of coronary heart disease, significant arrhythmia, peripheral arterial disease or other structural heart disease.    Revised Cardiac Risk Index (RCRI)  The patient has the following serious cardiovascular risks for perioperative complications:   - No serious cardiac risks = 0 points     RCRI Interpretation: 0 points: Class I (very low risk - 0.4% complication rate)         Signed Electronically by: MACARENA Hamm CNP  Copy of this evaluation report is provided to requesting physician.         "

## 2024-07-11 LAB — BACTERIA UR CULT: NORMAL

## 2024-07-11 NOTE — ANESTHESIA PREPROCEDURE EVALUATION
Anesthesia Pre-Procedure Evaluation    Patient: Gloria VILLAFANA Marcos   MRN: [id number] : 1990        Procedure : Procedure(s):  Cystoscopy, Left Ureteroscopy, Left Retrograde Pyelogram, Left Laser Lithotripsy, Possible Left Ureteral Stent Placement          Past Medical History:   Diagnosis Date    Anxiety     Depression     Gestational diabetes mellitus     NO ACTIVE PROBLEMS     Postpartum depression     PTSD (post-traumatic stress disorder)       Past Surgical History:   Procedure Laterality Date     SECTION      ,     SECTION N/A 2024    Procedure:  DELIVERY;  Surgeon: Donna Castelan MD;  Location: [facility] OR    PILONIDAL CYST / SINUS EXCISION        Allergies   Allergen Reactions    Banana Hives    Fruit Extracts      Bananas, itchy mouth    Ibuprofen Sodium Hives    Oseltamivir      Other reaction(s): Tremors    Sulfa Antibiotics Hives    Other [No Clinical Screening - See Comments] Rash     bleach    Silver Rash    Sodium Hypochlorite Other (See Comments) and Rash      Social History     Tobacco Use    Smoking status: Never    Smokeless tobacco: Never   Substance Use Topics    Alcohol use: No      Wt Readings from Last 1 Encounters:   07/10/24 90.7 kg (200 lb)        Anesthesia Evaluation   Pt has had prior anesthetic. Type: Regional and [facility].        ROS/MED HX  ENT/Pulmonary:       Neurologic:       Cardiovascular: Comment: Gestational Hypertension      METS/Exercise Tolerance:     Hematologic:     (+)      anemia,          Musculoskeletal:       GI/Hepatic:       Renal/Genitourinary:       Endo:     (+)               Obesity,       Psychiatric/Substance Use:     (+) psychiatric history anxiety and depression       Infectious Disease:       Malignancy:       Other:               OUTSIDE LABS:  CBC:   Lab Results   Component Value Date    WBC 9.3 06/10/2024    WBC 5.7 2024    HGB 14.7 06/10/2024    HGB 15.8 (H) 2024    HCT 42.7 06/10/2024    HCT  "47.2 (H) 03/05/2024     06/10/2024     03/05/2024     BMP:   Lab Results   Component Value Date     06/10/2024     03/05/2024    POTASSIUM 3.8 06/10/2024    POTASSIUM 3.6 03/05/2024    CHLORIDE 99 06/10/2024    CHLORIDE 105 03/05/2024    CO2 20 (L) 06/10/2024    CO2 23 03/05/2024    BUN 9.4 06/10/2024    BUN 5.1 (L) 03/05/2024    CR 0.74 06/10/2024    CR 0.77 03/05/2024     (H) 06/10/2024     (H) 03/05/2024     COAGS: No results found for: \"PTT\", \"INR\", \"FIBR\"  POC:   Lab Results   Component Value Date    HCG Negative 07/10/2024    HCGS Negative 06/10/2024     HEPATIC:   Lab Results   Component Value Date    ALBUMIN 4.2 03/05/2024    PROTTOTAL 8.1 03/05/2024    ALT 22 03/11/2024    AST 18 03/11/2024    ALKPHOS 105 03/05/2024    BILITOTAL 0.3 03/05/2024     OTHER:   Lab Results   Component Value Date    A1C 5.3 06/12/2023    JHON 9.0 06/10/2024    LIPASE 27 06/12/2023    TSH 0.10 (L) 09/14/2022    T4 0.91 09/14/2022              Miguel Angel Belle, APRN CRNA    I have reviewed the pertinent notes and labs in the chart from the past 30 days and (re)examined the patient.  Any updates or changes from those notes are reflected in this note.              # Obesity: Estimated body mass index is 34.33 kg/m  as calculated from the following:    Height as of 7/10/24: 1.626 m (5' 4\").    Weight as of 7/10/24: 90.7 kg (200 lb).      "

## 2024-07-13 ENCOUNTER — HOSPITAL ENCOUNTER (OUTPATIENT)
Dept: CT IMAGING | Facility: CLINIC | Age: 34
Discharge: HOME | End: 2024-07-13
Attending: STUDENT IN AN ORGANIZED HEALTH CARE EDUCATION/TRAINING PROGRAM | Admitting: STUDENT IN AN ORGANIZED HEALTH CARE EDUCATION/TRAINING PROGRAM
Payer: COMMERCIAL

## 2024-07-13 DIAGNOSIS — N20.0 NEPHROLITHIASIS: ICD-10-CM

## 2024-07-13 PROCEDURE — 72192 CT PELVIS W/O DYE: CPT

## 2024-07-17 ENCOUNTER — ANESTHESIA (OUTPATIENT)
Dept: SURGERY | Facility: CLINIC | Age: 34
End: 2024-07-17
Payer: COMMERCIAL

## 2024-12-21 ENCOUNTER — HEALTH MAINTENANCE LETTER (OUTPATIENT)
Age: 34
End: 2024-12-21

## 2025-01-06 SDOH — HEALTH STABILITY: PHYSICAL HEALTH: ON AVERAGE, HOW MANY MINUTES DO YOU ENGAGE IN EXERCISE AT THIS LEVEL?: 20 MIN

## 2025-01-06 SDOH — HEALTH STABILITY: PHYSICAL HEALTH: ON AVERAGE, HOW MANY DAYS PER WEEK DO YOU ENGAGE IN MODERATE TO STRENUOUS EXERCISE (LIKE A BRISK WALK)?: 2 DAYS

## 2025-01-06 ASSESSMENT — SOCIAL DETERMINANTS OF HEALTH (SDOH): HOW OFTEN DO YOU GET TOGETHER WITH FRIENDS OR RELATIVES?: MORE THAN THREE TIMES A WEEK

## 2025-01-09 ENCOUNTER — OFFICE VISIT (OUTPATIENT)
Dept: FAMILY MEDICINE | Facility: CLINIC | Age: 35
End: 2025-01-09
Payer: COMMERCIAL

## 2025-01-09 VITALS
RESPIRATION RATE: 16 BRPM | HEIGHT: 64 IN | HEART RATE: 96 BPM | BODY MASS INDEX: 34.5 KG/M2 | TEMPERATURE: 97.6 F | OXYGEN SATURATION: 98 % | DIASTOLIC BLOOD PRESSURE: 76 MMHG | SYSTOLIC BLOOD PRESSURE: 110 MMHG | WEIGHT: 202.1 LBS

## 2025-01-09 DIAGNOSIS — Z13.1 SCREENING FOR DIABETES MELLITUS: ICD-10-CM

## 2025-01-09 DIAGNOSIS — Z13.220 LIPID SCREENING: ICD-10-CM

## 2025-01-09 DIAGNOSIS — S61.319D LACERATION OF NAIL BED OF FINGER, SUBSEQUENT ENCOUNTER: ICD-10-CM

## 2025-01-09 DIAGNOSIS — Z12.4 CERVICAL CANCER SCREENING: ICD-10-CM

## 2025-01-09 DIAGNOSIS — Z00.00 ROUTINE GENERAL MEDICAL EXAMINATION AT A HEALTH CARE FACILITY: Primary | ICD-10-CM

## 2025-01-09 ASSESSMENT — PAIN SCALES - GENERAL: PAINLEVEL_OUTOF10: NO PAIN (0)

## 2025-01-09 NOTE — PATIENT INSTRUCTIONS
Patient Education   Preventive Care Advice   This is general advice given by our system to help you stay healthy. However, your care team may have specific advice just for you. Please talk to your care team about your preventive care needs.  Nutrition  Eat 5 or more servings of fruits and vegetables each day.  Try wheat bread, brown rice and whole grain pasta (instead of white bread, rice, and pasta).  Get enough calcium and vitamin D. Check the label on foods and aim for 100% of the RDA (recommended daily allowance).  Lifestyle  Exercise at least 150 minutes each week  (30 minutes a day, 5 days a week).  Do muscle strengthening activities 2 days a week. These help control your weight and prevent disease.  No smoking.  Wear sunscreen to prevent skin cancer.  Have a dental exam and cleaning every 6 months.  Yearly exams  See your health care team every year to talk about:  Any changes in your health.  Any medicines your care team has prescribed.  Preventive care, family planning, and ways to prevent chronic diseases.  Shots (vaccines)   HPV shots (up to age 26), if you've never had them before.  Hepatitis B shots (up to age 59), if you've never had them before.  COVID-19 shot: Get this shot when it's due.  Flu shot: Get a flu shot every year.  Tetanus shot: Get a tetanus shot every 10 years.  Pneumococcal, hepatitis A, and RSV shots: Ask your care team if you need these based on your risk.  Shingles shot (for age 50 and up)  General health tests  Diabetes screening:  Starting at age 35, Get screened for diabetes at least every 3 years.  If you are younger than age 35, ask your care team if you should be screened for diabetes.  Cholesterol test: At age 39, start having a cholesterol test every 5 years, or more often if advised.  Bone density scan (DEXA): At age 50, ask your care team if you should have this scan for osteoporosis (brittle bones).  Hepatitis C: Get tested at least once in your life.  STIs (sexually  transmitted infections)  Before age 24: Ask your care team if you should be screened for STIs.  After age 24: Get screened for STIs if you're at risk. You are at risk for STIs (including HIV) if:  You are sexually active with more than one person.  You don't use condoms every time.  You or a partner was diagnosed with a sexually transmitted infection.  If you are at risk for HIV, ask about PrEP medicine to prevent HIV.  Get tested for HIV at least once in your life, whether you are at risk for HIV or not.  Cancer screening tests  Cervical cancer screening: If you have a cervix, begin getting regular cervical cancer screening tests starting at age 21.  Breast cancer scan (mammogram): If you've ever had breasts, begin having regular mammograms starting at age 40. This is a scan to check for breast cancer.  Colon cancer screening: It is important to start screening for colon cancer at age 45.  Have a colonoscopy test every 10 years (or more often if you're at risk) Or, ask your provider about stool tests like a FIT test every year or Cologuard test every 3 years.  To learn more about your testing options, visit:   .  For help making a decision, visit:   https://bit.ly/ph86953.  Prostate cancer screening test: If you have a prostate, ask your care team if a prostate cancer screening test (PSA) at age 55 is right for you.  Lung cancer screening: If you are a current or former smoker ages 50 to 80, ask your care team if ongoing lung cancer screenings are right for you.  For informational purposes only. Not to replace the advice of your health care provider. Copyright   2023 [facility]. All rights reserved. Clinically reviewed by the [facility]. [facility] [id number] - REV 01/24.  Learning About Stress  What is stress?     Stress is your body's response to a hard situation. Your body can have a physical, emotional, or mental response. Stress is a fact of life for most people, and it  affects everyone differently. What causes stress for you may not be stressful for someone else.  A lot of things can cause stress. You may feel stress when you go on a job interview, take a test, or run a race. This kind of short-term stress is normal and even useful. It can help you if you need to work hard or react quickly. For example, stress can help you finish an important job on time.  Long-term stress is caused by ongoing stressful situations or events. Examples of long-term stress include long-term health problems, ongoing problems at work, or conflicts in your family. Long-term stress can harm your health.  How does stress affect your health?  When you are stressed, your body responds as though you are in danger. It makes hormones that speed up your heart, make you breathe faster, and give you a burst of energy. This is called the fight-or-flight stress response. If the stress is over quickly, your body goes back to normal and no harm is done.  But if stress happens too often or lasts too long, it can have bad effects. Long-term stress can make you more likely to get sick, and it can make symptoms of some diseases worse. If you tense up when you are stressed, you may develop neck, shoulder, or low back pain. Stress is linked to high blood pressure and heart disease.  Stress also harms your emotional health. It can make you hamm, tense, or depressed. Your relationships may suffer, and you may not do well at work or school.  What can you do to manage stress?  You can try these things to help manage stress:   Do something active. Exercise or activity can help reduce stress. Walking is a great way to get started. Even everyday activities such as housecleaning or yard work can help.  Try yoga or mary chi. These techniques combine exercise and meditation. You may need some training at first to learn them.  Do something you enjoy. For example, listen to music or go to a movie. Practice your hobby or do volunteer  "work.  Meditate. This can help you relax, because you are not worrying about what happened before or what may happen in the future.  Do guided imagery. Imagine yourself in any setting that helps you feel calm. You can use online videos, books, or a teacher to guide you.  Do breathing exercises. For example:  From a standing position, bend forward from the waist with your knees slightly bent. Let your arms dangle close to the floor.  Breathe in slowly and deeply as you return to a standing position. Roll up slowly and lift your head last.  Hold your breath for just a few seconds in the standing position.  Breathe out slowly and bend forward from the waist.  Let your feelings out. Talk, laugh, cry, and express anger when you need to. Talking with supportive friends or family, a counselor, or a penny leader about your feelings is a healthy way to relieve stress. Avoid discussing your feelings with people who make you feel worse.  Write. It may help to write about things that are bothering you. This helps you find out how much stress you feel and what is causing it. When you know this, you can find better ways to cope.  What can you do to prevent stress?  You might try some of these things to help prevent stress:  Manage your time. This helps you find time to do the things you want and need to do.  Get enough sleep. Your body recovers from the stresses of the day while you are sleeping.  Get support. Your family, friends, and community can make a difference in how you experience stress.  Limit your news feed. Avoid or limit time on social media or news that may make you feel stressed.  Do something active. Exercise or activity can help reduce stress. Walking is a great way to get started.  Where can you learn more?  Go to https://www.[employer].net/patiented  Enter N032 in the search box to learn more about \"Learning About Stress.\"  Current as of: October 24, 2023  Content Version: 14.3    2024 [employer]. "   Care instructions adapted under license by your healthcare professional. If you have questions about a medical condition or this instruction, always ask your healthcare professional. [employer] disclaims any warranty or liability for your use of this information.

## 2025-01-09 NOTE — PROGRESS NOTES
"Preventive Care Visit  [facility]  MACARENA Morales CNP, Nurse Practitioner Primary Care  Jan 9, 2025      Assessment & Plan     Routine general medical examination at a health care facility  Patient is in overall general good health. Patient concerned about her right hand pinky finger nail. Patient was accompanied by .     - REVIEW OF HEALTH MAINTENANCE PROTOCOL ORDERS    Laceration of nail bed of finger, subsequent encounter  Patient reports right pinky finger impact injury last April 2024. Fingernail bed is viable, however, growth further than 0.5 cm becomes dead and nail is lifting and getting caught on objects. Referring to hand specialist for further evaluation.               - Orthopedic  Referral; Future    Cervical cancer screening  PAP due today. Patient denies any abnormal discharge, odor, pain or abnormal periods.     - HPV and Gynecologic Cytology Panel - Recommended Age 30 - 65 Years  - Gynecologic Cytology (PAP)    Screening for diabetes mellitus  History of gestational diabetes and previous elevated glucose levels.    - Basic metabolic panel  (Ca, Cl, CO2, Creat, Gluc, K, Na, BUN); Future    Lipid screening  History of gestational diabetes and previous elevated glucose levels.    - Lipid panel reflex to direct LDL Fasting; Future    Patient has been advised of split billing requirements and indicates understanding: Yes    BMI  Estimated body mass index is 34.93 kg/m  as calculated from the following:    Height as of this encounter: 1.62 m (5' 3.78\").    Weight as of this encounter: 91.7 kg (202 lb 1.6 oz).   Weight management plan: Discussed healthy diet and exercise guidelines    Counseling  Appropriate preventive services were addressed with this patient via screening, questionnaire, or discussion as appropriate for fall prevention, nutrition, physical activity, Tobacco-use cessation, social engagement, weight loss and cognition.  Checklist " reviewing preventive services available has been given to the patient.  Reviewed patient's diet, addressing concerns and/or questions.   She is at risk for lack of exercise and has been provided with information to increase physical activity for the benefit of her well-being.   The patient was instructed to see the dentist every 6 months.   She is at risk for psychosocial distress and has been provided with information to reduce risk.       CONSULTATION/REFERRAL to Orthopedic/Hand specialist    Yair Molina is a 34 year old, presenting for the following:  Health Maintenance (Due for pap /No flu shot had allergy to it , no to Covid shot , no to hep B ) and Physical        1/9/2025     9:06 AM   Additional Questions   Roomed by janessa   Accompanied by self         1/9/2025     9:06 AM   Patient Reported Additional Medications   Patient reports taking the following new medications Women mult vit daily         HPI  Patient is in overall general good health. Patient concerned about her right hand pinky finger nail. Patient was accompanied by . Pap due. Patient denies any abnormal discharge, odor, pain or abnormal periods. Patient reports right pinky finger impact injury last April 2024. Fingernail bed is viable, however, growth further than 0.5 cm becomes dead and nail is lifting and getting caught on objects. Referring to hand specialist for further evaluation. Patient has history of gestational diabetes, checking lipids and bmp during this visit. Patient denies headache, pain, and nausea.     Chief Complaint   Patient presents with    Health Maintenance     Due for pap   No flu shot had allergy to it , no to Covid shot , no to hep B     Physical       Annual Wellness Visit     Patient has been advised of split billing requirements and indicates understanding: Yes      Health Care Directive  Patient does not have a Health Care Directive: Discussed advance care planning with patient; however, patient  declined at this time.      1/6/2025   General Health   How would you rate your overall physical health? Good   Feel stress (tense, anxious, or unable to sleep) To some extent          1/6/2025   Nutrition   Diet: Regular (no restrictions)         1/6/2025   Exercise   Days per week of moderate/strenous exercise 2 days   Average minutes spent exercising at this level 20 min     (!) EXERCISE CONCERN      1/6/2025   Social Factors   Frequency of gathering with friends or relatives More than three times a week   Worry food won't last until get money to buy more No   Food not last or not have enough money for food? No   Do you have housing? (Housing is defined as stable permanent housing and does not include staying ouside in a car, in a tent, in an abandoned building, in an overnight shelter, or couch-surfing.) Yes   Are you worried about losing your housing? No   Lack of transportation? No   Unable to get utilities (heat,electricity)? No            No data to display                  1/6/2025   Dental   Dentist two times every year? (!) NO          No data to display                     No data to display                  1/6/2025   TB Screening   Were you born outside of the [place]? No       Social History     Tobacco Use    Smoking status: Never    Smokeless tobacco: Never   Vaping Use    Vaping status: Never Used   Substance Use Topics    Alcohol use: Never    Drug use: Yes     Types: Marijuana     Comment: Have med card         Today's PHQ-2 Score:       1/8/2025     9:36 AM   PHQ-2 ( 1999 [employer])   Q1: Little interest or pleasure in doing things 0   Q2: Feeling down, depressed or hopeless 0   PHQ-2 Score 0    Q1: Little interest or pleasure in doing things Not at all   Q2: Feeling down, depressed or hopeless Not at all   PHQ-2 Score 0       Patient-reported          Mammogram Screening - Patient under 40 years of age: Routine Mammogram Screening not recommended.         1/6/2025   STI Screening   New sexual  partner(s) since last STI/HIV test? No     History of abnormal Pap smear: No - age 30- 64 PAP with HPV every 5 years recommended        Latest Ref Rng & Units 8/6/2021     1:05 PM 11/15/2012     1:04 PM   PAP / HPV   PAP  Negative for Intraepithelial Lesion or Malignancy (NILM)     PAP (Historical)   NIL    HPV 16 DNA Negative Negative     HPV 18 DNA Negative Negative     Other HR HPV Negative Negative             1/6/2025   Contraception/Family Planning   Questions about contraception or family planning No          Reviewed and updated as needed this visit by Provider     Meds     Fam Hx            Lab work is in process    Current providers sharing in care for this patient include:  Patient Care Team:  Kylee Bazan APRN CNP as PCP - General (Nurse Practitioner Primary Care)  Donna Castelan MD as Assigned OBGYN Provider  Brittney Haji PA-C as Physician Assistant (Urology)  Brittney Haji PA-C as Assigned Surgical Provider  Yaquelin Mariano APRN CNP as Assigned PCP    The following health maintenance items are reviewed in Epic and correct as of today:  Health Maintenance   Topic Date Due    PAP  08/06/2024    INFLUENZA VACCINE (1) 06/30/2025 (Originally 9/1/2024)    HEPATITIS B IMMUNIZATION (1 of 3 - 19+ 3-dose series) 12/31/2025 (Originally 7/21/2011)    COVID-19 Vaccine (5 - 2024-25 season) 12/31/2025 (Originally 9/1/2024)    YEARLY PREVENTIVE VISIT  01/09/2026    ANNUAL REVIEW OF HM ORDERS  01/09/2026    GLUCOSE  06/10/2027    ADVANCE CARE PLANNING  07/10/2029    DTAP/TDAP/TD IMMUNIZATION (6 - Td or Tdap) 11/22/2033    RSV VACCINE (1 - 1-dose 75+ series) 07/21/2067    HEPATITIS C SCREENING  Completed    HIV SCREENING  Completed    PHQ-2 (once per calendar year)  Completed    Pneumococcal Vaccine: Pediatrics (0 to 5 Years) and At-Risk Patients (6 to 49 Years)  Aged Out    HPV IMMUNIZATION  Aged Out    MENINGITIS IMMUNIZATION  Aged Out    RSV MONOCLONAL ANTIBODY  Aged Out        Appropriate preventive services were discussed with this patient, including applicable screening as appropriate for fall prevention, nutrition, physical activity, Tobacco-use cessation, weight loss and cognition.  Checklist reviewing preventive services available has been given to the patient.        Health Care Directive  Patient does not have a Health Care Directive: Discussed advance care planning with patient; however, patient declined at this time.      1/6/2025   General Health   How would you rate your overall physical health? Good   Feel stress (tense, anxious, or unable to sleep) To some extent   (!) STRESS CONCERN      1/6/2025   Nutrition   Three or more servings of calcium each day? Yes   Diet: Regular (no restrictions)   How many servings of fruit and vegetables per day? (!) 2-3   How many sweetened beverages each day? (!) 2         1/6/2025   Exercise   Days per week of moderate/strenous exercise 2 days   Average minutes spent exercising at this level 20 min   (!) EXERCISE CONCERN      1/6/2025   Social Factors   Frequency of gathering with friends or relatives More than three times a week   Worry food won't last until get money to buy more No   Food not last or not have enough money for food? No   Do you have housing? (Housing is defined as stable permanent housing and does not include staying ouside in a car, in a tent, in an abandoned building, in an overnight shelter, or couch-surfing.) Yes   Are you worried about losing your housing? No   Lack of transportation? No   Unable to get utilities (heat,electricity)? No         1/6/2025   Dental   Dentist two times every year? (!) NO         1/6/2025   TB Screening   Were you born outside of the [place]? No         Today's PHQ-2 Score:       1/8/2025     9:36 AM   PHQ-2 ( 1999 Pfizer)   Q1: Little interest or pleasure in doing things 0   Q2: Feeling down, depressed or hopeless 0   PHQ-2 Score 0    Q1: Little interest or pleasure in doing things Not at  "all   Q2: Feeling down, depressed or hopeless Not at all   PHQ-2 Score 0       Patient-reported           1/6/2025   Substance Use   Alcohol more than 3/day or more than 7/wk Not Applicable   Do you use any other substances recreationally? No     Social History     Tobacco Use    Smoking status: Never    Smokeless tobacco: Never   Vaping Use    Vaping status: Never Used   Substance Use Topics    Alcohol use: Never    Drug use: Yes     Types: Marijuana     Comment: Have med card                1/6/2025   STI Screening   New sexual partner(s) since last STI/HIV test? No     History of abnormal Pap smear: No - age 30-64 HPV with reflex Pap every 5 years recommended        Latest Ref Rng & Units 8/6/2021     1:05 PM 11/15/2012     1:04 PM   PAP / HPV   PAP  Negative for Intraepithelial Lesion or Malignancy (NILM)     PAP (Historical)   NIL    HPV 16 DNA Negative Negative     HPV 18 DNA Negative Negative     Other HR HPV Negative Negative             1/6/2025   Contraception/Family Planning   Questions about contraception or family planning No        Reviewed and updated as needed this visit by Provider                    Review of Systems  Constitutional, HEENT, cardiovascular, pulmonary, GI, , musculoskeletal, neuro, skin, endocrine and psych systems are negative, except as otherwise noted.     Objective    Exam  /76 (BP Location: Left arm, Patient Position: Sitting, Cuff Size: Adult Regular)   Pulse 96   Temp 97.6  F (36.4  C) (Tympanic)   Resp 16   Ht 1.62 m (5' 3.78\")   Wt 91.7 kg (202 lb 1.6 oz)   LMP 12/27/2024 (Exact Date)   SpO2 98%   BMI 34.93 kg/m     Estimated body mass index is 34.93 kg/m  as calculated from the following:    Height as of this encounter: 1.62 m (5' 3.78\").    Weight as of this encounter: 91.7 kg (202 lb 1.6 oz).    Physical Exam  GENERAL: alert and no distress  EYES: Eyes grossly normal to inspection, PERRL and conjunctivae and sclerae normal  HENT: ear canals and TM's " normal, nose and mouth without ulcers or lesions  NECK: no adenopathy, no asymmetry, masses, or scars  RESP: lungs clear to auscultation - no rales, rhonchi or wheezes  CV: regular rate and rhythm, normal S1 S2, no S3 or S4, no murmur, click or rub, no peripheral edema  ABDOMEN: soft, nontender, no hepatosplenomegaly, no masses and bowel sounds normal  MS: no gross musculoskeletal defects noted, no edema  SKIN: no suspicious lesions or rashes  NEURO: Normal strength and tone, mentation intact and speech normal  PSYCH: mentation appears normal, affect normal/bright        Signed Electronically by: MACARENA Morales CNP

## 2025-01-13 ENCOUNTER — LAB (OUTPATIENT)
Dept: LAB | Facility: CLINIC | Age: 35
End: 2025-01-13
Payer: COMMERCIAL

## 2025-01-13 DIAGNOSIS — Z13.1 SCREENING FOR DIABETES MELLITUS: ICD-10-CM

## 2025-01-13 DIAGNOSIS — Z13.220 LIPID SCREENING: ICD-10-CM

## 2025-01-13 LAB
ANION GAP SERPL CALCULATED.3IONS-SCNC: 11 MMOL/L (ref 7–15)
BKR AP ASSOCIATED HPV REPORT: NORMAL
BKR LAB AP GYN ADEQUACY: NORMAL
BKR LAB AP GYN INTERPRETATION: NORMAL
BKR LAB AP PREVIOUS ABNORMAL: NORMAL
BUN SERPL-MCNC: 11.5 MG/DL (ref 6–20)
CALCIUM SERPL-MCNC: 9.1 MG/DL (ref 8.8–10.4)
CHLORIDE SERPL-SCNC: 101 MMOL/L (ref 98–107)
CHOLEST SERPL-MCNC: 186 MG/DL
CREAT SERPL-MCNC: 0.66 MG/DL (ref 0.51–0.95)
EGFRCR SERPLBLD CKD-EPI 2021: >90 ML/MIN/1.73M2
FASTING STATUS PATIENT QL REPORTED: YES
FASTING STATUS PATIENT QL REPORTED: YES
GLUCOSE SERPL-MCNC: 91 MG/DL (ref 70–99)
HCO3 SERPL-SCNC: 25 MMOL/L (ref 22–29)
HDLC SERPL-MCNC: 62 MG/DL
LDLC SERPL CALC-MCNC: 105 MG/DL
NONHDLC SERPL-MCNC: 124 MG/DL
PATH REPORT.COMMENTS IMP SPEC: NORMAL
PATH REPORT.COMMENTS IMP SPEC: NORMAL
PATH REPORT.RELEVANT HX SPEC: NORMAL
POTASSIUM SERPL-SCNC: 3.9 MMOL/L (ref 3.4–5.3)
SODIUM SERPL-SCNC: 137 MMOL/L (ref 135–145)
TRIGL SERPL-MCNC: 96 MG/DL

## 2025-01-13 PROCEDURE — 36415 COLL VENOUS BLD VENIPUNCTURE: CPT

## 2025-01-13 PROCEDURE — 80061 LIPID PANEL: CPT

## 2025-01-13 PROCEDURE — 80048 BASIC METABOLIC PNL TOTAL CA: CPT

## 2025-02-05 ENCOUNTER — ANCILLARY PROCEDURE (OUTPATIENT)
Dept: GENERAL RADIOLOGY | Facility: CLINIC | Age: 35
End: 2025-02-05
Attending: ORTHOPAEDIC SURGERY
Payer: COMMERCIAL

## 2025-02-05 ENCOUNTER — OFFICE VISIT (OUTPATIENT)
Dept: ORTHOPEDICS | Facility: CLINIC | Age: 35
End: 2025-02-05
Payer: COMMERCIAL

## 2025-02-05 VITALS
WEIGHT: 204.2 LBS | HEIGHT: 64 IN | SYSTOLIC BLOOD PRESSURE: 125 MMHG | HEART RATE: 75 BPM | DIASTOLIC BLOOD PRESSURE: 80 MMHG | BODY MASS INDEX: 34.86 KG/M2

## 2025-02-05 DIAGNOSIS — L60.8 NAIL DEFORMITY: Primary | ICD-10-CM

## 2025-02-05 DIAGNOSIS — L60.8 NAIL DEFORMITY: ICD-10-CM

## 2025-02-05 PROCEDURE — 73140 X-RAY EXAM OF FINGER(S): CPT | Mod: TC | Performed by: RADIOLOGY

## 2025-02-05 PROCEDURE — 99243 OFF/OP CNSLTJ NEW/EST LOW 30: CPT | Performed by: ORTHOPAEDIC SURGERY

## 2025-02-05 ASSESSMENT — PAIN SCALES - GENERAL: PAINLEVEL_OUTOF10: NO PAIN (0)

## 2025-02-05 NOTE — PROGRESS NOTES
Chief Complaint:   Chief Complaint   Patient presents with    Right Little Finger - Pain     Little nail deformity. Onset: 2024, first noticed. Patient doesn't know when it happened. NKI. She will only have pain if she lifts up on the nail or gets caught on something. No tx.            Gloria Rodríguez is seen today in the [facility] for evaluation of right small finger nail deformity at the request of Kylee Bazan       HPI: Gloria Rodríguez is a 34 year old female , right -hand dominant, who presents for evaluation and management of a right small finger nail deformity without reported injury. She first noticed it 2024. Pain with lifting up on the nail or gets caught on something. She's noticed the past couple of months the new nail growing in better. She's been trimming away dead nail recently as the new nail grows in. She has snagged it and elevated the nailplate a while back.    She hasn't had her nails done (manicure)  since about 2024.    Not treatments.     She denies numbness or tingling.       Symptoms: pain.  Location: right small finger nail.  Pain severity: 0/10  Pain quality: sharp  Frequency of symptoms: occasionally.  Aggravating factors: pressure on the nail, catching the nail.        Past medical history:  has a past medical history of Anxiety, Depression, Gestational diabetes mellitus (2018), NO ACTIVE PROBLEMS, Postpartum depression (2016), and PTSD (post-traumatic stress disorder).    She has no past medical history of Arthritis, Cancer (H), Cerebral infarction (H), Congestive heart failure (H), COPD (chronic obstructive pulmonary disease) (H), Depressive disorder, Diabetes (H), Heart disease, History of blood transfusion, Hypertension, Thyroid disease, or Uncomplicated asthma.   Patient Active Problem List   Diagnosis    NO ACTIVE PROBLEMS    CARDIOVASCULAR SCREENING; LDL GOAL LESS THAN 130    Pilonidal cyst    History of  section    Placenta  previa in second trimester    History of gestational diabetes    S/P repeat low transverse     Gestational hypertension    Anemia due to blood loss, acute    Ureterolithiasis    Routine general medical examination at a health care facility    Cervical cancer screening    Screening for diabetes mellitus    Lipid screening    Laceration of nail bed of finger, subsequent encounter       Past surgical history:  has a past surgical history that includes  section; Pilonidal Cyst / Sinus Excision; and  section (N/A, 2024).     Medications:   Current Outpatient Medications:     loratadine (CLARITIN) 10 MG tablet, Take 10 mg by mouth daily, Disp: , Rfl:     Multiple Vitamins-Minerals (MULTIVITAMIN WOMEN PO), Take by mouth., Disp: , Rfl:      Allergies:     Allergies   Allergen Reactions    Banana Hives    Fruit Extracts      Bananas, itchy mouth    Ibuprofen Sodium Hives    Influenza Vaccines Muscle Pain (Myalgia)    Oseltamivir      Other reaction(s): Tremors    Sulfa Antibiotics Hives    Other [No Clinical Screening - See Comments] Rash     bleach    Silver Rash    Sodium Hypochlorite Other (See Comments) and Rash        Family History: family history includes Bipolar Disorder in her mother; Depression in her mother; Hyperlipidemia in her father; Hypertension in her paternal grandmother; Kidney Disease in her mother; Substance Abuse in her mother; Thrombosis in her paternal grandfather; Thyroid Disease in her paternal grandfather; Ulcers in her mother.     Social History: [employer].  reports that she has never smoked. She has never used smokeless tobacco. She reports current drug use. Drug: Marijuana. She reports that she does not drink alcohol.    Review of Systems:  ROS: 10 point ROS neg other than the symptoms noted above in the HPI and past medical history.    Physical Exam  GENERAL APPEARANCE: healthy, alert, no distress.   SKIN: no suspicious lesions or rashes  NEURO: Normal strength and  "tone, mentation intact and speech normal  PSYCH:  mentation appears normal and affect normal. Not anxious.  RESPIRATORY: No increased work of breathing.    /80   Pulse 75   Ht 1.62 m (5' 3.78\")   Wt 92.6 kg (204 lb 3.2 oz)   LMP 12/27/2024 (Exact Date)   BMI 35.29 kg/m       right HAND EXAM:     Skin intact. No open wounds.  Small finger nail plate in place.the distal 1/2 is yellowish, fragile appearing while the proximal 1/2 appears more normal/viable nail.  There is no swelling in the small finger.  There is slight  tenderness in the small finger nail plate, especially when trying to elevate it.  There is no ecchymosis.  There is no erythema of the surrounding skin.  There is no maceration of the skin.  There is no gross deformity in the area.  Range of motion: full, and (any)movements are not painful.  Intact sensation median, radial, ulnar nerves of the hand, and intact sensation to light touch radial and ulnar digital nerves to fingers.  Brisk capillary refill to all fingers.   Palpable radial pulse, 2+  Intact epl fpl fdp fds edc wrist flexion/extenion biceps triceps deltoid.    X-rays:  3 views right small finger from 2/5/2025 were reviewed personally in clinic today. On my review of the Xrays, No obvious fracture or dislocation. No obvious bony abnormality or lesion. .        Impression:  35yo RHD female with right small finger nail deformity.        Plan:  Exam, images reviewed  Unclear as to etiology of nail deformity, could either be due to some sort of underlying infection, or perhaps trauma.  Appears to be growing back now the past months, so might just continue to monitor growth, and make sure nail is cut back such that it doesn't snag on something.  If continues to be an issue, would refer to plastics hand surgery for evaluation, treatment options.  return to clinic as needed.    * All questions were addressed and answered prior to discharge from clinic today. The patient acknowledges an " understanding of and agreement with the plan set forth during today's visit. Patient was advised to call our office or MyChart us if any further questions arise upon leaving our office today.      Mitchell Palomo M.D., M.S.  Dept. of Orthopaedic Surgery  [facility]

## 2025-02-05 NOTE — LETTER
2/5/2025      Gloria Rodríguez  [address]  [facility] [place] [address]      Dear Colleague,    Thank you for referring your patient, Gloria Rodríguez, to the [facility] [place]. Please see a copy of my visit note below.    Chief Complaint:   Chief Complaint   Patient presents with     Right Little Finger - Pain     Little nail deformity. Onset: 4/2024, first noticed. Patient doesn't know when it happened. NKI. She will only have pain if she lifts up on the nail or gets caught on something. No tx.            Gloria Rodríguez is seen today in the [facility] for evaluation of right small finger nail deformity at the request of Kylee Bazan       HPI: Gloria Rodríguez is a 34 year old female , right -hand dominant, who presents for evaluation and management of a right small finger nail deformity without reported injury. She first noticed it 4/2024. Pain with lifting up on the nail or gets caught on something. She's noticed the past couple of months the new nail growing in better. She's been trimming away dead nail recently as the new nail grows in. She has snagged it and elevated the nailplate a while back.    She hasn't had her nails done (manicure)  since about 9/2024.    Not treatments.     She denies numbness or tingling.       Symptoms: pain.  Location: right small finger nail.  Pain severity: 0/10  Pain quality: sharp  Frequency of symptoms: occasionally.  Aggravating factors: pressure on the nail, catching the nail.        Past medical history:  has a past medical history of Anxiety, Depression, Gestational diabetes mellitus (2018), NO ACTIVE PROBLEMS, Postpartum depression (2016), and PTSD (post-traumatic stress disorder).    She has no past medical history of Arthritis, Cancer (H), Cerebral infarction (H), Congestive heart failure (H), COPD (chronic obstructive pulmonary disease) (H), Depressive disorder, Diabetes (H), Heart disease, History of blood  transfusion, Hypertension, Thyroid disease, or Uncomplicated asthma.   Patient Active Problem List   Diagnosis     NO ACTIVE PROBLEMS     CARDIOVASCULAR SCREENING; LDL GOAL LESS THAN 130     Pilonidal cyst     History of  section     Placenta previa in second trimester     History of gestational diabetes     S/P repeat low transverse      Gestational hypertension     Anemia due to blood loss, acute     Ureterolithiasis     Routine general medical examination at a health care facility     Cervical cancer screening     Screening for diabetes mellitus     Lipid screening     Laceration of nail bed of finger, subsequent encounter       Past surgical history:  has a past surgical history that includes  section; Pilonidal Cyst / Sinus Excision; and  section (N/A, 2024).     Medications:   Current Outpatient Medications:      loratadine (CLARITIN) 10 MG tablet, Take 10 mg by mouth daily, Disp: , Rfl:      Multiple Vitamins-Minerals (MULTIVITAMIN WOMEN PO), Take by mouth., Disp: , Rfl:      Allergies:     Allergies   Allergen Reactions     Banana Hives     Fruit Extracts      Bananas, itchy mouth     Ibuprofen Sodium Hives     Influenza Vaccines Muscle Pain (Myalgia)     Oseltamivir      Other reaction(s): Tremors     Sulfa Antibiotics Hives     Other [No Clinical Screening - See Comments] Rash     bleach     Silver Rash     Sodium Hypochlorite Other (See Comments) and Rash        Family History: family history includes Bipolar Disorder in her mother; Depression in her mother; Hyperlipidemia in her father; Hypertension in her paternal grandmother; Kidney Disease in her mother; Substance Abuse in her mother; Thrombosis in her paternal grandfather; Thyroid Disease in her paternal grandfather; Ulcers in her mother.     Social History: [employer] .  reports that she has never smoked. She has never used smokeless tobacco. She reports current drug use. Drug: Marijuana. She reports that she does  "not drink alcohol.    Review of Systems:  ROS: 10 point ROS neg other than the symptoms noted above in the HPI and past medical history.    Physical Exam  GENERAL APPEARANCE: healthy, alert, no distress.   SKIN: no suspicious lesions or rashes  NEURO: Normal strength and tone, mentation intact and speech normal  PSYCH:  mentation appears normal and affect normal. Not anxious.  RESPIRATORY: No increased work of breathing.    /80   Pulse 75   Ht 1.62 m (5' 3.78\")   Wt 92.6 kg (204 lb 3.2 oz)   LMP 12/27/2024 (Exact Date)   BMI 35.29 kg/m       right HAND EXAM:     Skin intact. No open wounds.  Small finger nail plate in place.the distal 1/2 is yellowish, fragile appearing while the proximal 1/2 appears more normal/viable nail.  There is no swelling in the small finger.  There is slight  tenderness in the small finger nail plate, especially when trying to elevate it.  There is no ecchymosis.  There is no erythema of the surrounding skin.  There is no maceration of the skin.  There is no gross deformity in the area.  Range of motion: full, and (any)movements are not painful.  Intact sensation median, radial, ulnar nerves of the hand, and intact sensation to light touch radial and ulnar digital nerves to fingers.  Brisk capillary refill to all fingers.   Palpable radial pulse, 2+  Intact epl fpl fdp fds edc wrist flexion/extenion biceps triceps deltoid.    X-rays:  3 views right small finger from 2/5/2025 were reviewed personally in clinic today. On my review of the Xrays, No obvious fracture or dislocation. No obvious bony abnormality or lesion. .        Impression:  35yo RHD female with right small finger nail deformity.        Plan:  Exam, images reviewed  Unclear as to etiology of nail deformity, could either be due to some sort of underlying infection, or perhaps trauma.  Appears to be growing back now the past months, so might just continue to monitor growth, and make sure nail is cut back such that it " doesn't snag on something.  If continues to be an issue, would refer to plastics hand surgery for evaluation, treatment options.  return to clinic as needed.    * All questions were addressed and answered prior to discharge from clinic today. The patient acknowledges an understanding of and agreement with the plan set forth during today's visit. Patient was advised to call our office or MyChart us if any further questions arise upon leaving our office today.      Mitchell Palomo M.D., M.S.  Dept. of Orthopaedic Surgery  [facility]       Again, thank you for allowing me to participate in the care of your patient.        Sincerely,        Mitchell Palomo MD    Electronically signed

## (undated) DEVICE — Device

## (undated) DEVICE — SOL WATER IRRIG 1000ML BOTTLE 07139-09

## (undated) DEVICE — GLOVE BIOGEL PI MICRO SZ 5.5 48555

## (undated) DEVICE — SOL NACL 0.9% IRRIG 1000ML BOTTLE 07138-09

## (undated) DEVICE — SU VICRYL 0 CT-1 36" J946H

## (undated) DEVICE — SU PDS II 0 TP-1 60" Z991G

## (undated) DEVICE — SU MONOCRYL 4-0 PS-2 18" UND Y496G

## (undated) DEVICE — SUCTION MANIFOLD NEPTUNE 2 SYS 1 PORT 702-025-000

## (undated) DEVICE — SU DERMABOND ADVANCED .7ML DNX12

## (undated) DEVICE — GLOVE BIOGEL PI MICRO INDICATOR UNDERGLOVE SZ 6.0 48960

## (undated) DEVICE — PACK C-SECTION LF PL15OTA83B

## (undated) DEVICE — DRAPE SHEET REV FOLD 3/4 9349

## (undated) DEVICE — SU MONOCRYL 0 CT-1 27" Y340H

## (undated) DEVICE — ESU PENCIL SMOKE EVAC W/ROCKER SWITCH 0703-047-000

## (undated) DEVICE — LABEL MEDICATION SYSTEM  3304

## (undated) DEVICE — CATH TRAY FOLEY SURESTEP 16FR W/URINE MTR STATLK LF A303416A

## (undated) DEVICE — PREP CHLORAPREP 26ML TINTED ORANGE  260815

## (undated) DEVICE — STOCKING SLEEVE COMPRESSION CALF MED

## (undated) RX ORDER — PHENYLEPHRINE HYDROCHLORIDE 10 MG/ML
INJECTION INTRAVENOUS
Status: DISPENSED
Start: 2024-01-02

## (undated) RX ORDER — EPHEDRINE SULFATE 50 MG/ML
INJECTION, SOLUTION INTRAMUSCULAR; INTRAVENOUS; SUBCUTANEOUS
Status: DISPENSED
Start: 2024-01-02

## (undated) RX ORDER — MORPHINE SULFATE 1 MG/ML
INJECTION, SOLUTION EPIDURAL; INTRATHECAL; INTRAVENOUS
Status: DISPENSED
Start: 2024-01-02

## (undated) RX ORDER — FENTANYL CITRATE 50 UG/ML
INJECTION, SOLUTION INTRAMUSCULAR; INTRAVENOUS
Status: DISPENSED
Start: 2024-01-02

## (undated) RX ORDER — FENTANYL CITRATE-0.9 % NACL/PF 10 MCG/ML
PLASTIC BAG, INJECTION (ML) INTRAVENOUS
Status: DISPENSED
Start: 2024-01-02

## (undated) RX ORDER — ONDANSETRON 2 MG/ML
INJECTION INTRAMUSCULAR; INTRAVENOUS
Status: DISPENSED
Start: 2024-01-02

## (undated) RX ORDER — GLYCOPYRROLATE 0.2 MG/ML
INJECTION, SOLUTION INTRAMUSCULAR; INTRAVENOUS
Status: DISPENSED
Start: 2024-01-02

## (undated) RX ORDER — OXYTOCIN/0.9 % SODIUM CHLORIDE 30/500 ML
PLASTIC BAG, INJECTION (ML) INTRAVENOUS
Status: DISPENSED
Start: 2024-01-02